# Patient Record
Sex: MALE | Race: WHITE | NOT HISPANIC OR LATINO | Employment: OTHER | URBAN - NONMETROPOLITAN AREA
[De-identification: names, ages, dates, MRNs, and addresses within clinical notes are randomized per-mention and may not be internally consistent; named-entity substitution may affect disease eponyms.]

---

## 2017-03-23 ENCOUNTER — HOSPITAL ENCOUNTER (EMERGENCY)
Facility: HOSPITAL | Age: 82
Discharge: HOME | End: 2017-03-23
Attending: EMERGENCY MEDICINE | Admitting: EMERGENCY MEDICINE
Payer: MEDICARE

## 2017-03-23 VITALS
TEMPERATURE: 96.5 F | RESPIRATION RATE: 18 BRPM | HEART RATE: 67 BPM | SYSTOLIC BLOOD PRESSURE: 118 MMHG | DIASTOLIC BLOOD PRESSURE: 64 MMHG | OXYGEN SATURATION: 96 %

## 2017-03-23 DIAGNOSIS — R31.9 HEMATURIA: ICD-10-CM

## 2017-03-23 DIAGNOSIS — N30.91 HEMORRHAGIC CYSTITIS: ICD-10-CM

## 2017-03-23 LAB
ALBUMIN SERPL-MCNC: 3.6 G/DL (ref 3.4–5)
ALBUMIN UR-MCNC: 100 MG/DL
ALP SERPL-CCNC: 66 U/L (ref 40–150)
ALT SERPL W P-5'-P-CCNC: 24 U/L (ref 0–70)
ANION GAP SERPL CALCULATED.3IONS-SCNC: 8 MMOL/L (ref 3–14)
APPEARANCE UR: CLEAR
APTT PPP: 28 SEC (ref 24–37)
AST SERPL W P-5'-P-CCNC: 19 U/L (ref 0–45)
BACTERIA #/AREA URNS HPF: ABNORMAL /HPF
BASOPHILS # BLD AUTO: 0.1 10E9/L (ref 0–0.2)
BASOPHILS NFR BLD AUTO: 1.1 %
BILIRUB SERPL-MCNC: 0.6 MG/DL (ref 0.2–1.3)
BILIRUB UR QL STRIP: NEGATIVE
BUN SERPL-MCNC: 28 MG/DL (ref 7–30)
CALCIUM SERPL-MCNC: 8.8 MG/DL (ref 8.5–10.1)
CHLORIDE SERPL-SCNC: 106 MMOL/L (ref 94–109)
CO2 SERPL-SCNC: 25 MMOL/L (ref 20–32)
COLOR UR AUTO: ABNORMAL
CREAT SERPL-MCNC: 1.16 MG/DL (ref 0.66–1.25)
CRP SERPL-MCNC: <2.9 MG/L (ref 0–8)
DIFFERENTIAL METHOD BLD: ABNORMAL
EOSINOPHIL # BLD AUTO: 0.5 10E9/L (ref 0–0.7)
EOSINOPHIL NFR BLD AUTO: 9.6 %
ERYTHROCYTE [DISTWIDTH] IN BLOOD BY AUTOMATED COUNT: 12.5 % (ref 10–15)
GFR SERPL CREATININE-BSD FRML MDRD: 59 ML/MIN/1.7M2
GLUCOSE SERPL-MCNC: 202 MG/DL (ref 70–99)
GLUCOSE UR STRIP-MCNC: NEGATIVE MG/DL
HCT VFR BLD AUTO: 35.6 % (ref 40–53)
HGB BLD-MCNC: 12.5 G/DL (ref 13.3–17.7)
HGB UR QL STRIP: ABNORMAL
IMM GRANULOCYTES # BLD: 0 10E9/L (ref 0–0.4)
IMM GRANULOCYTES NFR BLD: 0.2 %
INR PPP: 1.14 (ref 0.8–1.2)
KETONES UR STRIP-MCNC: NEGATIVE MG/DL
LEUKOCYTE ESTERASE UR QL STRIP: NEGATIVE
LYMPHOCYTES # BLD AUTO: 1.3 10E9/L (ref 0.8–5.3)
LYMPHOCYTES NFR BLD AUTO: 24.7 %
MCH RBC QN AUTO: 31.4 PG (ref 26.5–33)
MCHC RBC AUTO-ENTMCNC: 35.1 G/DL (ref 31.5–36.5)
MCV RBC AUTO: 89 FL (ref 78–100)
MONOCYTES # BLD AUTO: 0.5 10E9/L (ref 0–1.3)
MONOCYTES NFR BLD AUTO: 8.5 %
MUCOUS THREADS #/AREA URNS LPF: PRESENT /LPF
NEUTROPHILS # BLD AUTO: 3 10E9/L (ref 1.6–8.3)
NEUTROPHILS NFR BLD AUTO: 55.9 %
NITRATE UR QL: NEGATIVE
NRBC # BLD AUTO: 0 10*3/UL
NRBC BLD AUTO-RTO: 0 /100
PH UR STRIP: 5.5 PH (ref 4.7–8)
PLATELET # BLD AUTO: 145 10E9/L (ref 150–450)
POTASSIUM SERPL-SCNC: 4.4 MMOL/L (ref 3.4–5.3)
PROT SERPL-MCNC: 7.3 G/DL (ref 6.8–8.8)
RBC # BLD AUTO: 3.98 10E12/L (ref 4.4–5.9)
RBC #/AREA URNS AUTO: >182 /HPF (ref 0–2)
SODIUM SERPL-SCNC: 139 MMOL/L (ref 133–144)
SP GR UR STRIP: 1.01 (ref 1–1.03)
URN SPEC COLLECT METH UR: ABNORMAL
UROBILINOGEN UR STRIP-MCNC: NORMAL MG/DL (ref 0–2)
WBC # BLD AUTO: 5.4 10E9/L (ref 4–11)
WBC #/AREA URNS AUTO: 4 /HPF (ref 0–2)

## 2017-03-23 PROCEDURE — 85610 PROTHROMBIN TIME: CPT | Performed by: EMERGENCY MEDICINE

## 2017-03-23 PROCEDURE — 99283 EMERGENCY DEPT VISIT LOW MDM: CPT

## 2017-03-23 PROCEDURE — 86140 C-REACTIVE PROTEIN: CPT | Performed by: EMERGENCY MEDICINE

## 2017-03-23 PROCEDURE — 85025 COMPLETE CBC W/AUTO DIFF WBC: CPT | Performed by: EMERGENCY MEDICINE

## 2017-03-23 PROCEDURE — 25000132 ZZH RX MED GY IP 250 OP 250 PS 637: Mod: GY | Performed by: EMERGENCY MEDICINE

## 2017-03-23 PROCEDURE — 85730 THROMBOPLASTIN TIME PARTIAL: CPT | Performed by: EMERGENCY MEDICINE

## 2017-03-23 PROCEDURE — 36415 COLL VENOUS BLD VENIPUNCTURE: CPT | Performed by: EMERGENCY MEDICINE

## 2017-03-23 PROCEDURE — 99284 EMERGENCY DEPT VISIT MOD MDM: CPT | Performed by: EMERGENCY MEDICINE

## 2017-03-23 PROCEDURE — A9270 NON-COVERED ITEM OR SERVICE: HCPCS | Mod: GY | Performed by: EMERGENCY MEDICINE

## 2017-03-23 PROCEDURE — 80053 COMPREHEN METABOLIC PANEL: CPT | Performed by: EMERGENCY MEDICINE

## 2017-03-23 PROCEDURE — 81001 URINALYSIS AUTO W/SCOPE: CPT | Performed by: EMERGENCY MEDICINE

## 2017-03-23 RX ORDER — MULTIVIT-MIN/IRON/FOLIC ACID/K 18-600-40
1 CAPSULE ORAL DAILY
COMMUNITY
Start: 2013-12-03 | End: 2021-07-28

## 2017-03-23 RX ORDER — FINASTERIDE 5 MG/1
TABLET, FILM COATED ORAL
COMMUNITY
Start: 2016-04-07 | End: 2020-06-01

## 2017-03-23 RX ORDER — METFORMIN HCL 500 MG
TABLET, EXTENDED RELEASE 24 HR ORAL
COMMUNITY
Start: 2016-07-18 | End: 2020-05-21

## 2017-03-23 RX ORDER — OMEGA-3 FATTY ACIDS/FISH OIL 300-1000MG
CAPSULE ORAL
COMMUNITY
End: 2021-07-28

## 2017-03-23 RX ORDER — SIMVASTATIN 20 MG
TABLET ORAL
COMMUNITY
Start: 2016-08-01 | End: 2018-10-07

## 2017-03-23 RX ORDER — SULFAMETHOXAZOLE/TRIMETHOPRIM 800-160 MG
1 TABLET ORAL 2 TIMES DAILY
Qty: 6 TABLET | Refills: 0 | Status: SHIPPED | OUTPATIENT
Start: 2017-03-23 | End: 2017-03-26

## 2017-03-23 RX ORDER — MULTIPLE VITAMINS W/ MINERALS TAB 9MG-400MCG
1 TAB ORAL DAILY
COMMUNITY
End: 2019-06-12

## 2017-03-23 RX ORDER — SULFAMETHOXAZOLE/TRIMETHOPRIM 800-160 MG
1 TABLET ORAL ONCE
Status: COMPLETED | OUTPATIENT
Start: 2017-03-23 | End: 2017-03-23

## 2017-03-23 RX ORDER — LISINOPRIL 40 MG/1
TABLET ORAL
COMMUNITY
Start: 2017-01-29 | End: 2019-06-12

## 2017-03-23 RX ADMIN — SULFAMETHOXAZOLE AND TRIMETHOPRIM 1 TABLET: 800; 160 TABLET ORAL at 11:43

## 2017-03-23 ASSESSMENT — ENCOUNTER SYMPTOMS
DIFFICULTY URINATING: 0
CONSTITUTIONAL NEGATIVE: 1
BACK PAIN: 0
HEMATURIA: 1
DYSURIA: 0
FLANK PAIN: 0

## 2017-03-23 NOTE — ED PROVIDER NOTES
History     Chief Complaint   Patient presents with     Hematuria     onset yesterday. no complaints of pain. reports no other changes from normal.     HPI  Jeronimo Payton is a 89 year old male with above hx.  Asymptomatic onset of hematuria last nite without even dysuria or urgency.  No fever, back or gi symptoms. Previous TURP in 2003 with no problems since.     I have reviewed the Medications, Allergies, Past Medical and Surgical History, and Social History in the Epic system.    Review of Systems   Constitutional: Negative.    Genitourinary: Positive for hematuria. Negative for difficulty urinating, dysuria and flank pain.   Musculoskeletal: Negative for back pain.       Physical Exam   BP: 142/76  Pulse: 67  Temp: 96.5  F (35.8  C)  Resp: 18  SpO2: 96 %  Physical Exam   Constitutional: He appears well-developed.   Pleasant cooperative fellow appearing younger than stated age.    HENT:   Head: Normocephalic and atraumatic.   Eyes: Conjunctivae and EOM are normal. Pupils are equal, round, and reactive to light.   Neck: Normal range of motion.   Cardiovascular: Normal rate.    Pulmonary/Chest: Effort normal.   Abdominal: He exhibits no distension. There is no tenderness. There is no rebound.   appx scar   Genitourinary:   Genitourinary Comments: Uncircumsied, no lesions, rectal revealed enlarged but no nodules.    Musculoskeletal: Normal range of motion. He exhibits no edema.   Neurological: He is alert.   Skin: Skin is warm.     ED Course     ED Course     Procedures  Critical Care time:  none    Labs Ordered and Resulted from Time of ED Arrival Up to the Time of Departure from the ED   UA MACROSCOPIC WITH REFLEX TO MICRO AND CULTURE - Abnormal; Notable for the following:        Result Value    Blood Urine Large (*)     Protein Albumin Urine 100 (*)     RBC Urine >182 (*)     WBC Urine 4 (*)     Bacteria Urine None (*)     Mucous Urine Present (*)     All other components within normal limits   CBC WITH  PLATELETS DIFFERENTIAL - Abnormal; Notable for the following:     RBC Count 3.98 (*)     Hemoglobin 12.5 (*)     Hematocrit 35.6 (*)     Platelet Count 145 (*)     All other components within normal limits   COMPREHENSIVE METABOLIC PANEL - Abnormal; Notable for the following:     Glucose 202 (*)     GFR Estimate 59 (*)     All other components within normal limits   CRP INFLAMMATION   INR   PARTIAL THROMBOPLASTIN TIME     Assessments & Plan (with Medical Decision Making)   Jeronimo developed painless hematuria with only minimal WBCs so this is probably a structural problems but will assume it is hemorrhagic cystitis mediated by a bacterial infection.  Labs and culture done.  Bactrim DS given here in the ED with f/u for the next 5 days until seen jun Mchugh to decide on cysto and CT referrals.    I have reviewed the nursing notes.    I have reviewed the findings, diagnosis, plan and need for follow up with the patient.    New Prescriptions    SULFAMETHOXAZOLE-TRIMETHOPRIM (BACTRIM DS) 800-160 MG PER TABLET    Take 1 tablet by mouth 2 times daily for 3 days       Final diagnoses:   Hemorrhagic cystitis   Hematuria       3/23/2017   [facility]     Milton Perdomo MD  03/23/17 [id number]

## 2017-03-23 NOTE — DISCHARGE INSTRUCTIONS
Anatomy of the Male Urinary Tract  Your urinary tract helps to get rid of your body s liquid waste. The kidneys constantly filter the blood to collect unneeded chemicals and water, making urine. Urine travels through the ureters to the bladder. The bladder fills with urine, holding it until you re ready to release it. Signals from the brain tell the sphincter (muscles around the opening of the bladder) when to let urine flow out of the bladder. The urethra is the tube that carries urine from the bladder out of the body. In men, the prostate gland wraps around the urethra near the bladder.            0434-3853 [employer]. [address] [place] [address]. All rights reserved. This information is not intended as a substitute for professional medical care. Always follow your healthcare professional's instructions.          Hematuria: Possible Causes     Many things can lead to blood in the urine (hematuria). The blood may be seen with the eye. Or it may only be seen when the urine is looked at under a microscope. Some of the most common causes of blood in the urine are listed below. Often, no cause for the blood can be found. This is called idiopathic hematuria.    Kidney or bladder stones are collections of crystals. They form in the urine. Stones may be found anywhere in the urinary tract. But they form most often in the kidneys or bladder. In addition to blood in the urine, they can cause severe pain.   BPH stands for benign prostatic hyperplasia. It is enlargement of the prostate gland. It happens as men age. BPH often causes problems with urination. It sometimes causes blood in the urine.   A urinary tract infection (UTI) is due to bacteria growing in the urinary tract. It can cause blood in the urine. Other symptoms include burning or pain with urination. You may need to urinate often or urgently. You may also have a fever.     Damage to the urinary tract may cause blood in the urine.  This damage may be due to a blow or accident. It may also result from the use of a urinary catheter. Very hard exercise may sometimes irritate the urinary tract and cause bleeding.   Cancer may occur anywhere in the urinary tract. A tumor may sometimes cause no symptoms other than bleeding. Other possible causes of bleeding include:    Prostatitis (infection of the prostate gland)    Taking anticoagulant medications    Blockage in the urinary tract    Disease or inflammation of the kidney    Cystic diseases of the kidneys    Sickle cell anemia    Tumors of the urinary tract     7906-9077 [employer]. [address] [place] [address]. All rights reserved. This information is not intended as a substitute for professional medical care. Always follow your healthcare professional's instructions.          Understanding Urinary Tract Infections (UTIs)  Most UTIs are caused by bacteria, although they may also be caused by viruses or fungi. Bacteria from the bowel are the most common source of infection. The infection may begin because of any of the following:    Sexual activity. During sex, germs can travel from the penis, vagina, or rectum into the urethra.     Germs on the skin outside the rectum may travel into the urethra. This is more common in women since the rectum and urethra are closer to each other than in men. Wiping from front to back after using the toilet and keeping the area clean can help prevent germs from getting to the urethra.    Blockage of urine flow through the urinary tract. If urine sits too long, germs may begin to grow out of control.      Parts of the urinary tract  The infection can occur in any part of the urinary tract.    The kidneys collect and store urine.    The ureters carry urine from the kidneys to the bladder.    The bladder holds urine until you are ready to let it out.    The urethra carries urine from the bladder out of the body. It is shorter in women, so  bacteria can move through it more easily. The urethra is longer in men, so a UTI is less likely to reach the bladder or kidneys in men.    1280-9866 [employer]. [address] [place] [address], [place] [address]. All rights reserved. This information is not intended as a substitute for professional medical care. Always follow your healthcare professional's instructions.      Jeronimo,   This unexpected bleeding is usually caused by a bladder infection but it can be due to a structural lesion in your bladder or kidney that will need further imaging with a CT scan and cystoscopy by a urologist.  Take the antibiotics and plenty of fluids for the next few days and check in with Dr. Mchugh next week.  Nice meeting you folks.  Have a good spring out there in  [place].  Good luck!

## 2017-03-23 NOTE — ED NOTES
Patient presents today accompanied by spouse and son for hematuria.  Patient has had 3-4 episodes of painless hematuria.   Denies any pain or frequency and feels he is emptying his bladder.

## 2017-03-23 NOTE — ED AVS SNAPSHOT
[facility]    [address] [address]    Phone:  [phone]                                       Jeronimo Payton   MRN: [id number]    Department:  [facility]   Date of Visit:  3/23/2017           After Visit Summary Signature Page     I have received my discharge instructions, and my questions have been answered. I have discussed any challenges I see with this plan with the nurse or doctor.    ..........................................................................................................................................  Patient/Patient Representative Signature      ..........................................................................................................................................  Patient Representative Print Name and Relationship to Patient    ..................................................               ................................................  Date                                            Time    ..........................................................................................................................................  Reviewed by Signature/Title    ...................................................              ..............................................  Date                                                            Time

## 2017-03-23 NOTE — ED AVS SNAPSHOT
[facility]    [address] [address]    Phone:  [phone]                                       Jeronimo Payton   MRN: [id number]    Department:  [facility]   Date of Visit:  3/23/2017           Patient Information     Date Of Birth          6/29/1927        Your diagnoses for this visit were:     Hemorrhagic cystitis     Hematuria        You were seen by Milton Perdomo MD.      Follow-up Information     Follow up with Jim Mchugh MD.    Why:  As needed    Contact information:    [facility]  [address] [place] [address]  [phone]          Discharge Instructions         Anatomy of the Male Urinary Tract  Your urinary tract helps to get rid of your body s liquid waste. The kidneys constantly filter the blood to collect unneeded chemicals and water, making urine. Urine travels through the ureters to the bladder. The bladder fills with urine, holding it until you re ready to release it. Signals from the brain tell the sphincter (muscles around the opening of the bladder) when to let urine flow out of the bladder. The urethra is the tube that carries urine from the bladder out of the body. In men, the prostate gland wraps around the urethra near the bladder.            2668-9042 [employer]. [address] [place] [address]. All rights reserved. This information is not intended as a substitute for professional medical care. Always follow your healthcare professional's instructions.          Hematuria: Possible Causes     Many things can lead to blood in the urine (hematuria). The blood may be seen with the eye. Or it may only be seen when the urine is looked at under a microscope. Some of the most common causes of blood in the urine are listed below. Often, no cause for the blood can be found. This is called idiopathic hematuria.    Kidney or bladder stones are collections of crystals. They form in the urine. Stones may be  found anywhere in the urinary tract. But they form most often in the kidneys or bladder. In addition to blood in the urine, they can cause severe pain.   BPH stands for benign prostatic hyperplasia. It is enlargement of the prostate gland. It happens as men age. BPH often causes problems with urination. It sometimes causes blood in the urine.   A urinary tract infection (UTI) is due to bacteria growing in the urinary tract. It can cause blood in the urine. Other symptoms include burning or pain with urination. You may need to urinate often or urgently. You may also have a fever.     Damage to the urinary tract may cause blood in the urine. This damage may be due to a blow or accident. It may also result from the use of a urinary catheter. Very hard exercise may sometimes irritate the urinary tract and cause bleeding.   Cancer may occur anywhere in the urinary tract. A tumor may sometimes cause no symptoms other than bleeding. Other possible causes of bleeding include:    Prostatitis (infection of the prostate gland)    Taking anticoagulant medications    Blockage in the urinary tract    Disease or inflammation of the kidney    Cystic diseases of the kidneys    Sickle cell anemia    Tumors of the urinary tract     2371-7038 [employer]. [address] [place], [place] [address]. All rights reserved. This information is not intended as a substitute for professional medical care. Always follow your healthcare professional's instructions.          Understanding Urinary Tract Infections (UTIs)  Most UTIs are caused by bacteria, although they may also be caused by viruses or fungi. Bacteria from the bowel are the most common source of infection. The infection may begin because of any of the following:    Sexual activity. During sex, germs can travel from the penis, vagina, or rectum into the urethra.     Germs on the skin outside the rectum may travel into the urethra. This is more common in women since the  rectum and urethra are closer to each other than in men. Wiping from front to back after using the toilet and keeping the area clean can help prevent germs from getting to the urethra.    Blockage of urine flow through the urinary tract. If urine sits too long, germs may begin to grow out of control.      Parts of the urinary tract  The infection can occur in any part of the urinary tract.    The kidneys collect and store urine.    The ureters carry urine from the kidneys to the bladder.    The bladder holds urine until you are ready to let it out.    The urethra carries urine from the bladder out of the body. It is shorter in women, so bacteria can move through it more easily. The urethra is longer in men, so a UTI is less likely to reach the bladder or kidneys in men.    6879-9367 [employer]. [address] [place], [place] [address]. All rights reserved. This information is not intended as a substitute for professional medical care. Always follow your healthcare professional's instructions.      Jeronimo,   This unexpected bleeding is usually caused by a bladder infection but it can be due to a structural lesion in your bladder or kidney that will need further imaging with a CT scan and cystoscopy by a urologist.  Take the antibiotics and plenty of fluids for the next few days and check in with Dr. Mchugh next week.  Nice meeting you folks.  Have a good spring out there in  [place].  Good luck!       Review of your medicines      START taking        Dose / Directions Last dose taken    sulfamethoxazole-trimethoprim 800-160 MG per tablet   Commonly known as:  BACTRIM DS   Dose:  1 tablet   Quantity:  6 tablet        Take 1 tablet by mouth 2 times daily for 3 days   Refills:  0          Our records show that you are taking the medicines listed below. If these are incorrect, please call your family doctor or clinic.        Dose / Directions Last dose taken    ADVIL 200 MG capsule   Generic drug:   ibuprofen        Refills:  0        aspirin 81 MG tablet        Refills:  0        finasteride 5 MG tablet   Commonly known as:  PROSCAR        TAKE 1 TABLET BY MOUTH DAILY   Refills:  0        lisinopril 40 MG tablet   Commonly known as:  PRINIVIL/ZESTRIL        TAKE ONE TABLET BY MOUTH ONCE DAILY   Refills:  0        metFORMIN 500 MG 24 hr tablet   Commonly known as:  GLUCOPHAGE-XR        Take 1,000 mg in AM and 500 mg in PM. Swallow tablet whole; do not crush, divide or chew.   Refills:  0        Multi-vitamin Tabs tablet   Dose:  1 tablet        Take 1 tablet by mouth daily   Refills:  0        simvastatin 20 MG tablet   Commonly known as:  ZOCOR        TAKE 1 TABLET BY MOUTH EVERY DAY   Refills:  0        vitamin D 2000 UNITS Caps   Dose:  1 capsule        Take 1 capsule by mouth daily   Refills:  0                Prescriptions were sent or printed at these locations (1 Prescription)                   [facility] - [place], [place] - [address]   [address], [place] [address]    Telephone:  [phone]   Fax:  [phone]   Hours:                  Printed at Department/Unit printer (1 of 1)         sulfamethoxazole-trimethoprim (BACTRIM DS) 800-160 MG per tablet                Procedures and tests performed during your visit     CBC with platelets differential    CRP inflammation    Comprehensive metabolic panel    INR    Partial thromboplastin time    UA reflex to Microscopic and Culture      Orders Needing Specimen Collection     None      Pending Results     No orders found from 3/21/2017 to 3/24/2017.            Pending Culture Results     No orders found from 3/21/2017 to 3/24/2017.            Thank you for choosing [facility]       Thank you for choosing [facility] for your care. Our goal is always to provide you with excellent care. Hearing back from our patients is one way we can continue to improve our services. Please take a few minutes to complete the written survey that you may receive in  "the mail after you visit with us. Thank you!        [employer] Information     [employer] lets you send messages to your doctor, view your test results, renew your prescriptions, schedule appointments and more. To sign up, go to [email or web address] . Click on \"Log in\" on the left side of the screen, which will take you to the Welcome page. Then click on \"Sign up Now\" on the right side of the page.     You will be asked to enter the access code listed below, as well as some personal information. Please follow the directions to create your username and password.     Your access code is: [email or web address]  Expires: 2017 11:34 AM     Your access code will  in 90 days. If you need help or a new code, please call your [facility] clinic or [phone].        Care EveryWhere ID     This is your Care EveryWhere ID. This could be used by other organizations to access your [facility] medical records  [id number]        After Visit Summary       This is your record. Keep this with you and show to your community pharmacist(s) and doctor(s) at your next visit.                  "

## 2017-03-23 NOTE — ED NOTES
Discharge instructions reviewed with patient and he verbalizes understanding.  Denies any pain. Verbalizes understanding need for follow up.  1 written rx given for patient to fill at pharmacy of choice.

## 2017-03-28 ENCOUNTER — TRANSFERRED RECORDS (OUTPATIENT)
Dept: HEALTH INFORMATION MANAGEMENT | Facility: HOSPITAL | Age: 82
End: 2017-03-28

## 2017-04-03 ENCOUNTER — HOSPITAL ENCOUNTER (OUTPATIENT)
Dept: CT IMAGING | Facility: HOSPITAL | Age: 82
Discharge: HOME | End: 2017-04-03
Attending: FAMILY MEDICINE | Admitting: FAMILY MEDICINE
Payer: MEDICARE

## 2017-04-03 ENCOUNTER — TRANSFERRED RECORDS (OUTPATIENT)
Dept: HEALTH INFORMATION MANAGEMENT | Facility: HOSPITAL | Age: 82
End: 2017-04-03

## 2017-04-03 DIAGNOSIS — R31.9 HEMATURIA: Primary | ICD-10-CM

## 2017-04-03 PROCEDURE — 74178 CT ABD&PLV WO CNTR FLWD CNTR: CPT | Mod: TC

## 2017-04-03 RX ORDER — IOPAMIDOL 612 MG/ML
100 INJECTION, SOLUTION INTRAVASCULAR ONCE
Status: COMPLETED | OUTPATIENT
Start: 2017-04-03 | End: 2017-04-03

## 2017-04-03 RX ORDER — IOPAMIDOL 612 MG/ML
50 INJECTION, SOLUTION INTRAVASCULAR ONCE
Status: COMPLETED | OUTPATIENT
Start: 2017-04-03 | End: 2017-04-03

## 2017-04-03 RX ADMIN — IOPAMIDOL 30 ML: 612 INJECTION, SOLUTION INTRAVASCULAR at 15:51

## 2017-04-03 RX ADMIN — IOPAMIDOL 100 ML: 612 INJECTION, SOLUTION INTRAVASCULAR at 15:51

## 2017-04-25 ENCOUNTER — TRANSFERRED RECORDS (OUTPATIENT)
Dept: HEALTH INFORMATION MANAGEMENT | Facility: HOSPITAL | Age: 82
End: 2017-04-25

## 2017-04-25 LAB
ALBUMIN (URINE) MG/SPEC: 14.1 MG/DL
ALBUMIN/CREATININE RATIO: 99 (ref 0–29)
CREATININE (URINE): 142 MG/DL

## 2018-01-17 ENCOUNTER — TRANSFERRED RECORDS (OUTPATIENT)
Dept: HEALTH INFORMATION MANAGEMENT | Facility: CLINIC | Age: 83
End: 2018-01-17

## 2018-01-17 ENCOUNTER — HOSPITAL ENCOUNTER (OUTPATIENT)
Dept: CT IMAGING | Facility: HOSPITAL | Age: 83
Discharge: HOME | End: 2018-01-17
Attending: FAMILY MEDICINE | Admitting: FAMILY MEDICINE
Payer: MEDICARE
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
## 2018-01-17 DIAGNOSIS — T14.90XA TRAUMA: ICD-10-CM

## 2018-01-17 PROCEDURE — 70450 CT HEAD/BRAIN W/O DYE: CPT | Mod: TC

## 2018-01-31 ENCOUNTER — TRANSFERRED RECORDS (OUTPATIENT)
Dept: HEALTH INFORMATION MANAGEMENT | Facility: CLINIC | Age: 83
End: 2018-01-31

## 2018-03-12 ENCOUNTER — TRANSFERRED RECORDS (OUTPATIENT)
Dept: HEALTH INFORMATION MANAGEMENT | Facility: CLINIC | Age: 83
End: 2018-03-12

## 2018-03-28 LAB
CREAT SERPL-MCNC: 1.2 MG/DL (ref 0.7–1.2)
GFR SERPL CREATININE-BSD FRML MDRD: 57 ML/MIN/1.73M2
GLUCOSE SERPL-MCNC: 179 MG/DL (ref 70–100)
INR PPP: 1.1 (ref 0.9–1.1)
POTASSIUM SERPL-SCNC: 4.6 MEQ/L (ref 3.4–5.1)

## 2018-04-24 ENCOUNTER — TRANSFERRED RECORDS (OUTPATIENT)
Dept: HEALTH INFORMATION MANAGEMENT | Facility: HOSPITAL | Age: 83
End: 2018-04-24

## 2018-04-24 LAB
ALBUMIN (URINE) MG/SPEC: 5 MG/DL
ALBUMIN/CREATININE RATIO: 50 (ref 0–29)
CREATININE (URINE): 100 MG/DL

## 2018-07-08 ENCOUNTER — HOSPITAL ENCOUNTER (EMERGENCY)
Facility: HOSPITAL | Age: 83
Discharge: HOME | End: 2018-07-08
Attending: PHYSICIAN ASSISTANT | Admitting: PHYSICIAN ASSISTANT
Payer: MEDICARE

## 2018-07-08 ENCOUNTER — APPOINTMENT (OUTPATIENT)
Dept: GENERAL RADIOLOGY | Facility: HOSPITAL | Age: 83
End: 2018-07-08
Attending: PHYSICIAN ASSISTANT
Payer: MEDICARE

## 2018-07-08 VITALS
TEMPERATURE: 98 F | SYSTOLIC BLOOD PRESSURE: 127 MMHG | OXYGEN SATURATION: 98 % | RESPIRATION RATE: 16 BRPM | HEART RATE: 60 BPM | DIASTOLIC BLOOD PRESSURE: 74 MMHG

## 2018-07-08 DIAGNOSIS — M62.81 GENERALIZED MUSCLE WEAKNESS: ICD-10-CM

## 2018-07-08 DIAGNOSIS — R50.9 FEBRILE ILLNESS, ACUTE: ICD-10-CM

## 2018-07-08 LAB
ALBUMIN SERPL-MCNC: 3.3 G/DL (ref 3.4–5)
ALBUMIN UR-MCNC: 30 MG/DL
ALP SERPL-CCNC: 77 U/L (ref 40–150)
ALT SERPL W P-5'-P-CCNC: 103 U/L (ref 0–70)
ANION GAP SERPL CALCULATED.3IONS-SCNC: 10 MMOL/L (ref 3–14)
APPEARANCE UR: ABNORMAL
AST SERPL W P-5'-P-CCNC: 129 U/L (ref 0–45)
BACTERIA #/AREA URNS HPF: ABNORMAL /HPF
BASOPHILS # BLD AUTO: 0 10E9/L (ref 0–0.2)
BASOPHILS NFR BLD AUTO: 0.7 %
BILIRUB SERPL-MCNC: 0.7 MG/DL (ref 0.2–1.3)
BILIRUB UR QL STRIP: NEGATIVE
BUN SERPL-MCNC: 18 MG/DL (ref 7–30)
CALCIUM SERPL-MCNC: 8.2 MG/DL (ref 8.5–10.1)
CHLORIDE SERPL-SCNC: 102 MMOL/L (ref 94–109)
CO2 SERPL-SCNC: 23 MMOL/L (ref 20–32)
COLOR UR AUTO: YELLOW
CREAT SERPL-MCNC: 1.11 MG/DL (ref 0.66–1.25)
CRP SERPL-MCNC: 32.6 MG/L (ref 0–8)
DIFFERENTIAL METHOD BLD: ABNORMAL
EOSINOPHIL # BLD AUTO: 0 10E9/L (ref 0–0.7)
EOSINOPHIL NFR BLD AUTO: 0.4 %
ERYTHROCYTE [DISTWIDTH] IN BLOOD BY AUTOMATED COUNT: 12.4 % (ref 10–15)
GFR SERPL CREATININE-BSD FRML MDRD: 62 ML/MIN/1.7M2
GLUCOSE SERPL-MCNC: 196 MG/DL (ref 70–99)
GLUCOSE UR STRIP-MCNC: NEGATIVE MG/DL
HCT VFR BLD AUTO: 33.8 % (ref 40–53)
HETEROPH AB SER QL: NEGATIVE
HGB BLD-MCNC: 11.9 G/DL (ref 13.3–17.7)
HGB UR QL STRIP: ABNORMAL
IMM GRANULOCYTES # BLD: 0 10E9/L (ref 0–0.4)
IMM GRANULOCYTES NFR BLD: 0.7 %
KETONES UR STRIP-MCNC: NEGATIVE MG/DL
LACTATE SERPL-SCNC: 1.8 MMOL/L (ref 0.4–2)
LEUKOCYTE ESTERASE UR QL STRIP: NEGATIVE
LYMPHOCYTES # BLD AUTO: 0.7 10E9/L (ref 0.8–5.3)
LYMPHOCYTES NFR BLD AUTO: 26.9 %
MCH RBC QN AUTO: 31.6 PG (ref 26.5–33)
MCHC RBC AUTO-ENTMCNC: 35.2 G/DL (ref 31.5–36.5)
MCV RBC AUTO: 90 FL (ref 78–100)
MONOCYTES # BLD AUTO: 0.7 10E9/L (ref 0–1.3)
MONOCYTES NFR BLD AUTO: 26.5 %
MUCOUS THREADS #/AREA URNS LPF: PRESENT /LPF
NEUTROPHILS # BLD AUTO: 1.2 10E9/L (ref 1.6–8.3)
NEUTROPHILS NFR BLD AUTO: 44.8 %
NITRATE UR QL: NEGATIVE
NRBC # BLD AUTO: 0 10*3/UL
NRBC BLD AUTO-RTO: 0 /100
PARASITE SPEC INSPECT: NORMAL
PH UR STRIP: 5.5 PH (ref 4.7–8)
PLATELET # BLD AUTO: 98 10E9/L (ref 150–450)
POTASSIUM SERPL-SCNC: 3.9 MMOL/L (ref 3.4–5.3)
PROT SERPL-MCNC: 7 G/DL (ref 6.8–8.8)
RBC # BLD AUTO: 3.77 10E12/L (ref 4.4–5.9)
RBC #/AREA URNS AUTO: 2 /HPF (ref 0–2)
SODIUM SERPL-SCNC: 135 MMOL/L (ref 133–144)
SOURCE: ABNORMAL
SP GR UR STRIP: 1.01 (ref 1–1.03)
SPECIMEN SOURCE: NORMAL
UROBILINOGEN UR STRIP-MCNC: 2 MG/DL (ref 0–2)
WBC # BLD AUTO: 2.8 10E9/L (ref 4–11)
WBC #/AREA URNS AUTO: 2 /HPF (ref 0–5)

## 2018-07-08 PROCEDURE — 87207 SMEAR SPECIAL STAIN: CPT | Performed by: PHYSICIAN ASSISTANT

## 2018-07-08 PROCEDURE — 81001 URINALYSIS AUTO W/SCOPE: CPT | Performed by: PHYSICIAN ASSISTANT

## 2018-07-08 PROCEDURE — 87798 DETECT AGENT NOS DNA AMP: CPT

## 2018-07-08 PROCEDURE — 87015 SPECIMEN INFECT AGNT CONCNTJ: CPT | Performed by: PHYSICIAN ASSISTANT

## 2018-07-08 PROCEDURE — 87040 BLOOD CULTURE FOR BACTERIA: CPT | Mod: 59 | Performed by: PHYSICIAN ASSISTANT

## 2018-07-08 PROCEDURE — 25000132 ZZH RX MED GY IP 250 OP 250 PS 637: Mod: GY | Performed by: PHYSICIAN ASSISTANT

## 2018-07-08 PROCEDURE — A9270 NON-COVERED ITEM OR SERVICE: HCPCS | Mod: GY | Performed by: PHYSICIAN ASSISTANT

## 2018-07-08 PROCEDURE — 86308 HETEROPHILE ANTIBODY SCREEN: CPT | Performed by: PHYSICIAN ASSISTANT

## 2018-07-08 PROCEDURE — 36415 COLL VENOUS BLD VENIPUNCTURE: CPT | Performed by: PHYSICIAN ASSISTANT

## 2018-07-08 PROCEDURE — 99284 EMERGENCY DEPT VISIT MOD MDM: CPT | Performed by: PHYSICIAN ASSISTANT

## 2018-07-08 PROCEDURE — 87077 CULTURE AEROBIC IDENTIFY: CPT | Performed by: PHYSICIAN ASSISTANT

## 2018-07-08 PROCEDURE — 71045 X-RAY EXAM CHEST 1 VIEW: CPT | Mod: TC

## 2018-07-08 PROCEDURE — 86618 LYME DISEASE ANTIBODY: CPT | Performed by: PHYSICIAN ASSISTANT

## 2018-07-08 PROCEDURE — 85025 COMPLETE CBC W/AUTO DIFF WBC: CPT | Performed by: PHYSICIAN ASSISTANT

## 2018-07-08 PROCEDURE — 87186 SC STD MICRODIL/AGAR DIL: CPT | Performed by: PHYSICIAN ASSISTANT

## 2018-07-08 PROCEDURE — 86140 C-REACTIVE PROTEIN: CPT | Performed by: PHYSICIAN ASSISTANT

## 2018-07-08 PROCEDURE — 83605 ASSAY OF LACTIC ACID: CPT | Performed by: PHYSICIAN ASSISTANT

## 2018-07-08 PROCEDURE — 80053 COMPREHEN METABOLIC PANEL: CPT | Performed by: PHYSICIAN ASSISTANT

## 2018-07-08 PROCEDURE — 99284 EMERGENCY DEPT VISIT MOD MDM: CPT | Mod: 25

## 2018-07-08 RX ORDER — DOXYCYCLINE 100 MG/1
100 CAPSULE ORAL 2 TIMES DAILY
Qty: 28 CAPSULE | Refills: 0 | Status: SHIPPED | OUTPATIENT
Start: 2018-07-08 | End: 2018-07-22

## 2018-07-08 RX ORDER — DOXYCYCLINE HYCLATE 100 MG
100 TABLET ORAL ONCE
Status: COMPLETED | OUTPATIENT
Start: 2018-07-08 | End: 2018-07-08

## 2018-07-08 RX ORDER — ACETAMINOPHEN 325 MG/1
975 TABLET ORAL ONCE
Status: COMPLETED | OUTPATIENT
Start: 2018-07-08 | End: 2018-07-08

## 2018-07-08 RX ADMIN — ACETAMINOPHEN 975 MG: 325 TABLET, FILM COATED ORAL at 13:17

## 2018-07-08 RX ADMIN — DOXYCYCLINE HYCLATE 100 MG: 100 TABLET, FILM COATED ORAL at 15:51

## 2018-07-08 ASSESSMENT — ENCOUNTER SYMPTOMS
NAUSEA: 0
DIARRHEA: 0
CHEST TIGHTNESS: 0
SHORTNESS OF BREATH: 0
FEVER: 1
SORE THROAT: 0
VOMITING: 0
CHILLS: 1
COUGH: 0
WEAKNESS: 1
WHEEZING: 0
ABDOMINAL PAIN: 0

## 2018-07-08 NOTE — DISCHARGE INSTRUCTIONS
Take the Doxycycline as prescribed for your suspected tick borne illness. Increase fluids as you were mildly dehydrated today. Use your cane when walking. Please return here with any new or worsening symptoms as discussed.         Febrile Illness with Uncertain Cause (Adult)  You have a fever, but the cause is unknown. A fever is a natural reaction of the body to an illness such as infection due to a virus or bacteria. Sometimes other conditions such as cancer or immune diseases can cause fever, especially if the fever has lasted for more than a week or 2. In most cases, the temperature itself is not harmful. It actually helps the body fight infections. A fever does not need to be treated unless you feel very uncomfortable.  Sometimes a fever can be an early sign of a more serious infection, so make sure to follow up if your condition worsens.  Home care  Unless given other instructions by your healthcare provider, follow these guidelines when caring for yourself at home.  General care    If your symptoms are not severe, rest at home for the first 2 to 3 days. When you resume activity, don't let yourself get too tired.    For your overall health, don't smoke. Also avoid being exposed to secondhand smoke.    Your appetite may be poor, so a light diet is fine. Avoid dehydration by drinking 6 to 8 glasses of fluids per day (such as water, soft drinks, sports drinks, juices, tea, or soup). If you have congestion, extra fluids will help loosen secretions in the nose and lungs.  Medicines    You can take acetaminophen or ibuprofen for pain or to lower your temperature, unless you were given a different medicine to use. (Note: If you have chronic liver or kidney disease or have ever had a stomach ulcer or gastrointestinal bleeding, talk with your healthcare provider before using these medicines. Also talk to your provider if you are taking medicine to prevent blood clots.) Aspirin should never be given to anyone younger  than 18 years of age who is ill with a viral infection or fever. It may cause severe liver or brain damage.    If you were given antibiotics for an infection, take them until they are used up, or your healthcare provider tells you to stop. It is important to finish the antibiotics even though you feel better. This is to make sure the infection has cleared. Be aware that antibiotics are not usually given for a viral infection or a fever with an unknown cause.    Over-the-counter medicines will not shorten the duration of the illness. However, they may be helpful for the following symptoms: cough, sore throat, or nasal and sinus congestion. Ask your pharmacist for product suggestions. (Note: Don't use decongestants if you have high blood pressure.)  Follow-up care  Follow up with your healthcare provider, or as advised.    If a culture or other lab tests were done, you will be notified if your treatment needs to be changed. You can call as directed for the results.    If X-rays, a CT, or an ultrasound were done, a specialist will review them. You will be notified of any findings that may affect your care.  Call 911  Call 911 if any of these occur:    Trouble breathing or swallowing, or wheezing    Chest pain    Confusion    Extreme drowsiness or trouble awakening    Fainting or loss of consciousness    Rapid heart rate    Low blood pressure    Vomiting blood, or large amounts of blood in stool    Seizure  When to seek medical advice  Call your healthcare provider right away if any of these occur:    Cough with lots of colored sputum (mucus) or blood in your sputum    Severe headache    Face, neck, throat, or ear pain    Feeling drowsy    Abdominal pain    Repeated vomiting or diarrhea    Joint pain or a new rash    Burning when urinating    Fever of 100.4 F (38 C) or higher, or as directed by your healthcare provider    Feeling weak or dizzy  Date Last Reviewed: 11/1/2017    Weakness with Uncertain Cause  Based on  your exam today, the exact cause of your weakness is not certain. But your weakness does not seem to be a sign of a serious illness at this time. Keep an eye on your symptoms and get medical advice as instructed below.  Home care    Rest at home today. Don't over-exert yourself.    Take any medicine as prescribed.    For the next few days, drink extra fluids (unless your healthcare provider wants you to restrict fluids for other reasons). Don't skip meals.    Unless otherwise directed, continue to take any prescription medicines.    Contact your healthcare provider if you have any questions or concerns.  Follow-up care  Follow up with your healthcare provider, or as advised.  When to seek medical advice  Call your healthcare provider right away for any of the following:    Symptoms get worse    Symptoms don't start getting better within 2 days    Fever of 100.4  F (38  C) or higher, or as directed by your healthcare provider  Call 911  Call 911 for any of these:    Chest, arm, neck, jaw, or upper back pain    Trouble breathing    Numbness or weakness of the face, one arm, or one leg    Slurred speech, confusion, or trouble speaking, walking, or seeing    Blood in vomit or stool (black or red color)    Loss of consciousness    Severe headache  Date Last Reviewed: 10/1/2017    2894-7698 [employer]. [address] [place] [address]. All rights reserved. This information is not intended as a substitute for professional medical care. Always follow your healthcare professional's instructions.          5989-0368 [employer]. [address] [place] [address]. All rights reserved. This information is not intended as a substitute for professional medical care. Always follow your healthcare professional's instructions.

## 2018-07-08 NOTE — ED AVS SNAPSHOT
[facility]    [address] [address]    Phone:  [phone]                                       Jeronimo Payton   MRN: [id number]    Department:  [facility]   Date of Visit:  7/8/2018           Patient Information     Date Of Birth          6/29/1927        Your diagnoses for this visit were:     Febrile illness, acute     Generalized muscle weakness        You were seen by Gina Chatman PA-C.      Follow-up Information     Follow up with Jim Mchugh MD In 2 days.    Specialty:  Family Practice    Contact information:    [facility]  [address] [facility] [id number] [phone]          Follow up with [facility].    Specialty:  EMERGENCY MEDICINE    Why:  If symptoms worsen    Contact information:    [address] [address] [phone]    Additional information:    From [facility]: Take [address]. Turn left at [address]. Turn left at the first stoplight on [address]. At the first stop sign, take a right onto [address]. Take a left into the parking lot and continue through until you reach the North enterance of the building.       From [place]: Take [address]. Take the [address] ramp towards [place]. Turn left onto [address]. Take a slight right onto [address]. Turn left at the first stoplight on [address]. At the first stop sign, take a right onto [address]. Take a left into the parking lot and continue through until you reach the North enterance of the building.       From [place]: Take [address]. Take a right at [address]. At the first stop sign, take a right onto [address]. Take a left into the parking lot and continue through until you reach the North enterance of the building.         Discharge Instructions       Take the Doxycycline as prescribed for your suspected tick borne illness. Increase fluids  as you were mildly dehydrated today. Use your cane when walking. Please return here with any new or worsening symptoms as discussed.         Febrile Illness with Uncertain Cause (Adult)  You have a fever, but the cause is unknown. A fever is a natural reaction of the body to an illness such as infection due to a virus or bacteria. Sometimes other conditions such as cancer or immune diseases can cause fever, especially if the fever has lasted for more than a week or 2. In most cases, the temperature itself is not harmful. It actually helps the body fight infections. A fever does not need to be treated unless you feel very uncomfortable.  Sometimes a fever can be an early sign of a more serious infection, so make sure to follow up if your condition worsens.  Home care  Unless given other instructions by your healthcare provider, follow these guidelines when caring for yourself at home.  General care    If your symptoms are not severe, rest at home for the first 2 to 3 days. When you resume activity, don't let yourself get too tired.    For your overall health, don't smoke. Also avoid being exposed to secondhand smoke.    Your appetite may be poor, so a light diet is fine. Avoid dehydration by drinking 6 to 8 glasses of fluids per day (such as water, soft drinks, sports drinks, juices, tea, or soup). If you have congestion, extra fluids will help loosen secretions in the nose and lungs.  Medicines    You can take acetaminophen or ibuprofen for pain or to lower your temperature, unless you were given a different medicine to use. (Note: If you have chronic liver or kidney disease or have ever had a stomach ulcer or gastrointestinal bleeding, talk with your healthcare provider before using these medicines. Also talk to your provider if you are taking medicine to prevent blood clots.) Aspirin should never be given to anyone younger than 18 years of age who is ill with a viral infection or fever. It may cause severe liver  or brain damage.    If you were given antibiotics for an infection, take them until they are used up, or your healthcare provider tells you to stop. It is important to finish the antibiotics even though you feel better. This is to make sure the infection has cleared. Be aware that antibiotics are not usually given for a viral infection or a fever with an unknown cause.    Over-the-counter medicines will not shorten the duration of the illness. However, they may be helpful for the following symptoms: cough, sore throat, or nasal and sinus congestion. Ask your pharmacist for product suggestions. (Note: Don't use decongestants if you have high blood pressure.)  Follow-up care  Follow up with your healthcare provider, or as advised.    If a culture or other lab tests were done, you will be notified if your treatment needs to be changed. You can call as directed for the results.    If X-rays, a CT, or an ultrasound were done, a specialist will review them. You will be notified of any findings that may affect your care.  Call 911  Call 911 if any of these occur:    Trouble breathing or swallowing, or wheezing    Chest pain    Confusion    Extreme drowsiness or trouble awakening    Fainting or loss of consciousness    Rapid heart rate    Low blood pressure    Vomiting blood, or large amounts of blood in stool    Seizure  When to seek medical advice  Call your healthcare provider right away if any of these occur:    Cough with lots of colored sputum (mucus) or blood in your sputum    Severe headache    Face, neck, throat, or ear pain    Feeling drowsy    Abdominal pain    Repeated vomiting or diarrhea    Joint pain or a new rash    Burning when urinating    Fever of 100.4 F (38 C) or higher, or as directed by your healthcare provider    Feeling weak or dizzy  Date Last Reviewed: 11/1/2017    Weakness with Uncertain Cause  Based on your exam today, the exact cause of your weakness is not certain. But your weakness does not  seem to be a sign of a serious illness at this time. Keep an eye on your symptoms and get medical advice as instructed below.  Home care    Rest at home today. Don't over-exert yourself.    Take any medicine as prescribed.    For the next few days, drink extra fluids (unless your healthcare provider wants you to restrict fluids for other reasons). Don't skip meals.    Unless otherwise directed, continue to take any prescription medicines.    Contact your healthcare provider if you have any questions or concerns.  Follow-up care  Follow up with your healthcare provider, or as advised.  When to seek medical advice  Call your healthcare provider right away for any of the following:    Symptoms get worse    Symptoms don't start getting better within 2 days    Fever of 100.4  F (38  C) or higher, or as directed by your healthcare provider  Call 911  Call 911 for any of these:    Chest, arm, neck, jaw, or upper back pain    Trouble breathing    Numbness or weakness of the face, one arm, or one leg    Slurred speech, confusion, or trouble speaking, walking, or seeing    Blood in vomit or stool (black or red color)    Loss of consciousness    Severe headache  Date Last Reviewed: 10/1/2017    9352-4977 The [employer]. [address] [place] [address]. All rights reserved. This information is not intended as a substitute for professional medical care. Always follow your healthcare professional's instructions.          2425-3009 [employer]. [address] [place] [address]. All rights reserved. This information is not intended as a substitute for professional medical care. Always follow your healthcare professional's instructions.          Discharge References/Attachments     FEBRILE ILLNESS, UNCERTAIN CAUSE (ADULT) (ENGLISH)         Review of your medicines      START taking        Dose / Directions Last dose taken    doxycycline 100 MG capsule   Commonly known as:  VIBRAMYCIN    Dose:  100 mg   Quantity:  28 capsule        Take 1 capsule (100 mg) by mouth 2 times daily for 14 days   Refills:  0          Our records show that you are taking the medicines listed below. If these are incorrect, please call your family doctor or clinic.        Dose / Directions Last dose taken    ADVIL 200 MG capsule   Generic drug:  ibuprofen        Refills:  0        aspirin 81 MG tablet        Refills:  0        finasteride 5 MG tablet   Commonly known as:  PROSCAR        TAKE 1 TABLET BY MOUTH DAILY   Refills:  0        lisinopril 40 MG tablet   Commonly known as:  PRINIVIL/ZESTRIL        TAKE ONE TABLET BY MOUTH ONCE DAILY   Refills:  0        metFORMIN 500 MG 24 hr tablet   Commonly known as:  GLUCOPHAGE-XR        Take 1,000 mg in AM and 500 mg in PM. Swallow tablet whole; do not crush, divide or chew.   Refills:  0        Multi-vitamin Tabs tablet   Dose:  1 tablet        Take 1 tablet by mouth daily   Refills:  0        simvastatin 20 MG tablet   Commonly known as:  ZOCOR        TAKE 0.5 TABLET BY MOUTH EVERY DAY   Refills:  0        vitamin D 2000 units Caps   Dose:  1 capsule        Take 1 capsule by mouth daily   Refills:  0                Prescriptions were sent or printed at these locations (1 Prescription)                   [facility] [address] [facility] [address] [address] [address]   [address], [place] [address]    Telephone:  [phone]   Fax:  [phone]   Hours:                  E-Prescribed (1 of 1)         doxycycline (VIBRAMYCIN) 100 MG capsule                Procedures and tests performed during your visit     Procedure/Test Number of Times Performed    Babesia Species by PCR 1    Blood culture 2    CBC with platelets differential 1    CRP inflammation 1    Chest  XR, 1 view portable 1    Comprehensive metabolic panel 1    Ehrlichia Anaplasma Sp by PCR 1    Lactic acid 1    Lyme Disease Maude with reflex to WB Serum 1    Mononucleosis screen 1    Parasite stain 2    Peripheral IV catheter 1  "   UA reflex to Microscopic and Culture 1      Orders Needing Specimen Collection     None      Pending Results     Date and Time Order Name Status Description    2018 1313 Parasite stain In process     2018 1313 Lyme Disease Maude with reflex to WB Serum In process     2018 1313 Ehrlichia Anaplasma Sp by PCR In process     2018 1313 Blood culture In process     2018 1313 Blood culture In process     2018 1313 Babesia Species by PCR In process     2018 1311 Chest  XR, 1 view portable In process             Pending Culture Results     Date and Time Order Name Status Description    2018 1313 Parasite stain In process     2018 1313 Blood culture In process     2018 1313 Blood culture In process             Thank you for choosing [facility]       Thank you for choosing [facility] for your care. Our goal is always to provide you with excellent care. Hearing back from our patients is one way we can continue to improve our services. Please take a few minutes to complete the written survey that you may receive in the mail after you visit with us. Thank you!        [employer] Information     [employer] lets you send messages to your doctor, view your test results, renew your prescriptions, schedule appointments and more. To sign up, go to [email or web address] . Click on \"Log in\" on the left side of the screen, which will take you to the Welcome page. Then click on \"Sign up Now\" on the right side of the page.     You will be asked to enter the access code listed below, as well as some personal information. Please follow the directions to create your username and password.     Your access code is: [email or web address]  Expires: 10/6/2018  3:54 PM     Your access code will  in 90 days. If you need help or a new code, please call your [facility] clinic or [phone].        Care EveryWhere ID     This is your Care EveryWhere ID. This could be used by other organizations to access your [facility] " medical records  [id number]        Equal Access to Services     XAVIER GONZÁLES : Michael Augustin, carlo trejo, tony taylor. So [facility] [phone].    ATENCIÓN: Si habla español, tiene a jack disposición servicios gratuitos de asistencia lingüística. Llame al [phone].    We comply with applicable federal civil rights laws and [place] laws. We do not discriminate on the basis of race, color, national origin, age, disability, sex, sexual orientation, or gender identity.            After Visit Summary       This is your record. Keep this with you and show to your community pharmacist(s) and doctor(s) at your next visit.

## 2018-07-08 NOTE — ED NOTES
"The patient presents via [facility] from home where he lives with his wife with report of weakness today and fever a couple of days ago. Reports he noted a \"bug bite\" on his left forearm yesterday that he bumped and it bled.   "

## 2018-07-08 NOTE — ED AVS SNAPSHOT
[facility]    [address] [address]    Phone:  [phone]                                       Jeronimo Payton   MRN: [id number]    Department:  [facility]   Date of Visit:  7/8/2018           After Visit Summary Signature Page     I have received my discharge instructions, and my questions have been answered. I have discussed any challenges I see with this plan with the nurse or doctor.    ..........................................................................................................................................  Patient/Patient Representative Signature      ..........................................................................................................................................  Patient Representative Print Name and Relationship to Patient    ..................................................               ................................................  Date                                            Time    ..........................................................................................................................................  Reviewed by Signature/Title    ...................................................              ..............................................  Date                                                            Time

## 2018-07-08 NOTE — ED NOTES
Bed: ED03  Expected date:   Expected time:   Means of arrival:   Comments:  [place] [facility]

## 2018-07-08 NOTE — ED PROVIDER NOTES
History     Chief Complaint   Patient presents with     Fever     bullseye bug bite approx 5 days ago. DM and bg in the 220     HPI  Jeronimo Payton is a 89 year old male who is brought in via ambulance from home with bilateral lower extremity weakness, fevers/chills x 2 days, and concern for possible tick bite to left forearm. Noticed a bite marco to his left forearm 5 days ago and a bullseye rash. Did not see a tick, however. Has been taking tylenol for his fevers, last dose was last night. Usually walks with a cane but was unable to stand up just prior to arrival, so family called 911. He lives at home with his wife. His son is here as well. He is a type II diabetic on metformin. Glucose was 220 in route.     Problem List:    There are no active problems to display for this patient.       Past Medical History:    No past medical history on file.    Past Surgical History:    No past surgical history on file.    Family History:    No family history on file.    Social History:  Marital Status:   [2]  Social History   Substance Use Topics     Smoking status: Never Smoker     Smokeless tobacco: Never Used     Alcohol use Yes      Comment: beer occasionally        Medications:      aspirin 81 MG tablet   Cholecalciferol (VITAMIN D) 2000 UNITS CAPS   doxycycline (VIBRAMYCIN) 100 MG capsule   finasteride (PROSCAR) 5 MG tablet   ibuprofen (ADVIL) 200 MG capsule   lisinopril (PRINIVIL/ZESTRIL) 40 MG tablet   metFORMIN (GLUCOPHAGE-XR) 500 MG 24 hr tablet   multivitamin, therapeutic with minerals (MULTI-VITAMIN) TABS tablet   simvastatin (ZOCOR) 20 MG tablet         Review of Systems   Constitutional: Positive for chills and fever.   HENT: Negative for congestion and sore throat.    Respiratory: Negative for cough, chest tightness, shortness of breath and wheezing.    Cardiovascular: Negative for chest pain.   Gastrointestinal: Negative for abdominal pain, diarrhea, nausea and vomiting.   Skin: Negative.  Negative  for rash.   Neurological: Positive for weakness (Generalized. ).   All other systems reviewed and are negative.      Physical Exam   BP: 129/93 (Simultaneous filing. User may not have seen previous data.)  Heart Rate: 78 (Simultaneous filing. User may not have seen previous data.)  Temp: 99.7  F (37.6  C)  Resp: 16  SpO2: 96 %      Physical Exam   Constitutional: He is oriented to person, place, and time. He appears well-developed and well-nourished.  Non-toxic appearance. He does not have a sickly appearance. He appears ill. No distress.   HENT:   Head: Normocephalic and atraumatic.   Right Ear: External ear normal.   Left Ear: External ear normal.   Nose: Nose normal.   Mouth/Throat: Oropharynx is clear and moist. No oropharyngeal exudate.   Eyes: Conjunctivae and EOM are normal. Pupils are equal, round, and reactive to light. Right eye exhibits no discharge. Left eye exhibits no discharge. No scleral icterus.   Neck: Normal range of motion. Neck supple. No JVD present.   Cardiovascular: Normal rate, regular rhythm, normal heart sounds and intact distal pulses.  Exam reveals no gallop and no friction rub.    No murmur heard.  Pulmonary/Chest: Effort normal and breath sounds normal. No respiratory distress. He has no wheezes. He has no rales. He exhibits no tenderness.   Abdominal: He exhibits no distension and no mass. There is no tenderness. There is no rebound and no guarding.   Musculoskeletal: Normal range of motion. He exhibits no edema.   Lymphadenopathy:     He has no cervical adenopathy.   Neurological: He is alert and oriented to person, place, and time. No cranial nerve deficit. Coordination normal.   Skin: Skin is warm and dry. No rash noted. He is not diaphoretic. There is erythema. No pallor.        Psychiatric: He has a normal mood and affect. His behavior is normal. Judgment and thought content normal.   Nursing note and vitals reviewed.      ED Course     ED Course     Procedures                Results for orders placed or performed during the hospital encounter of 07/08/18 (from the past 24 hour(s))   Parasite stain   Result Value Ref Range    Specimen Description Blood     Parasite Stain Duplicate request  Canceled, Test credited      CBC with platelets differential   Result Value Ref Range    WBC 2.8 (L) 4.0 - 11.0 10e9/L    RBC Count 3.77 (L) 4.4 - 5.9 10e12/L    Hemoglobin 11.9 (L) 13.3 - 17.7 g/dL    Hematocrit 33.8 (L) 40.0 - 53.0 %    MCV 90 78 - 100 fl    MCH 31.6 26.5 - 33.0 pg    MCHC 35.2 31.5 - 36.5 g/dL    RDW 12.4 10.0 - 15.0 %    Platelet Count 98 (L) 150 - 450 10e9/L    Diff Method Automated Method     % Neutrophils 44.8 %    % Lymphocytes 26.9 %    % Monocytes 26.5 %    % Eosinophils 0.4 %    % Basophils 0.7 %    % Immature Granulocytes 0.7 %    Nucleated RBCs 0 0 /100    Absolute Neutrophil 1.2 (L) 1.6 - 8.3 10e9/L    Absolute Lymphocytes 0.7 (L) 0.8 - 5.3 10e9/L    Absolute Monocytes 0.7 0.0 - 1.3 10e9/L    Absolute Eosinophils 0.0 0.0 - 0.7 10e9/L    Absolute Basophils 0.0 0.0 - 0.2 10e9/L    Abs Immature Granulocytes 0.0 0 - 0.4 10e9/L    Absolute Nucleated RBC 0.0    Comprehensive metabolic panel   Result Value Ref Range    Sodium 135 133 - 144 mmol/L    Potassium 3.9 3.4 - 5.3 mmol/L    Chloride 102 94 - 109 mmol/L    Carbon Dioxide 23 20 - 32 mmol/L    Anion Gap 10 3 - 14 mmol/L    Glucose 196 (H) 70 - 99 mg/dL    Urea Nitrogen 18 7 - 30 mg/dL    Creatinine 1.11 0.66 - 1.25 mg/dL    GFR Estimate 62 >60 mL/min/1.7m2    GFR Estimate If Black 75 >60 mL/min/1.7m2    Calcium 8.2 (L) 8.5 - 10.1 mg/dL    Bilirubin Total 0.7 0.2 - 1.3 mg/dL    Albumin 3.3 (L) 3.4 - 5.0 g/dL    Protein Total 7.0 6.8 - 8.8 g/dL    Alkaline Phosphatase 77 40 - 150 U/L     (H) 0 - 70 U/L     (H) 0 - 45 U/L   CRP inflammation   Result Value Ref Range    CRP Inflammation 32.6 (H) 0.0 - 8.0 mg/L   Lactic acid   Result Value Ref Range    Lactic Acid 1.8 0.4 - 2.0 mmol/L   Mononucleosis screen    Result Value Ref Range    Mononucleosis Screen Negative NEG^Negative   UA reflex to Microscopic and Culture   Result Value Ref Range    Color Urine Yellow     Appearance Urine Slightly Cloudy     Glucose Urine Negative NEG^Negative mg/dL    Bilirubin Urine Negative NEG^Negative    Ketones Urine Negative NEG^Negative mg/dL    Specific Gravity Urine 1.015 1.003 - 1.035    Blood Urine Small (A) NEG^Negative    pH Urine 5.5 4.7 - 8.0 pH    Protein Albumin Urine 30 (A) NEG^Negative mg/dL    Urobilinogen mg/dL 2.0 0.0 - 2.0 mg/dL    Nitrite Urine Negative NEG^Negative    Leukocyte Esterase Urine Negative NEG^Negative    Source Midstream Urine     RBC Urine 2 0 - 2 /HPF    WBC Urine 2 0 - 5 /HPF    Bacteria Urine Few (A) NEG^Negative /HPF    Mucous Urine Present (A) NEG^Negative /LPF       Medications   acetaminophen (TYLENOL) tablet 975 mg (975 mg Oral Given 7/8/18 1317)   doxycycline (VIBRA-TABS) tablet 100 mg (100 mg Oral Given 7/8/18 1551)       Assessments & Plan (with Medical Decision Making)   Jeronimo presents with generalized weakness, low grade fevers, and concern for possible tick bite with bullseye rash 5 days ago. There is a 1.5cm circular area to his left forearm with erythema and a small skin tear where he states the small bite was 5 days ago. No bullseye rash today. Physical exam is otherwise unremarkable. Broad infection workup was performed today with no obvious source. His white count is low at 2.58. CRP elevated at 32. LFT's slightly bumped. UA negative for infection. CXR no obvious infiltrate per my read. Tick panel ordered and pending. He was given a 1 liter bolus of NS and Tylenol 975mg PO for low grade temp. Following, he is able to stand up and walk unassisted. Pt and his family feel given his strength has improved that they are comfortable taking him back home at this point. I will cover him for lymes with doxycycline 100mg BID x 14 days, first dose given here. I suspect he was mildly dehydrated  today given improvement with fluids. Suggested increase fluids and fever control. Close f/u with primary care and return here with any new/worsening symptoms.     Plan: Take the Doxycycline as prescribed for your suspected tick borne illness. Increase fluids as you were mildly dehydrated today. Use your cane when walking. Please return here with any new or worsening symptoms as discussed.     I have reviewed the nursing notes.    I have reviewed the findings, diagnosis, plan and need for follow up with the patient.    New Prescriptions    DOXYCYCLINE (VIBRAMYCIN) 100 MG CAPSULE    Take 1 capsule (100 mg) by mouth 2 times daily for 14 days       Final diagnoses:   Febrile illness, acute   Generalized muscle weakness       7/8/2018   [facility]     Gina Chatman PA-C  07/08/18 3383

## 2018-07-09 NOTE — PROVIDER NOTIFICATION
DATE:  7/9/2018   TIME OF RECEIPT FROM LAB:  0402  LAB TEST:  Blood cultures  LAB VALUE:  2 bottles positive for gram + cocci  RESULTS GIVEN WITH READ-BACK TO (PROVIDER):  Yo Velazquez  TIME LAB VALUE REPORTED TO PROVIDER:   0500      * Patient started on oral Doxycycline. Will await final result and sensitivity, per MD.

## 2018-07-10 LAB — B BURGDOR IGG+IGM SER QL: 0.04 (ref 0–0.89)

## 2018-07-11 LAB
Lab: NORMAL
PARASITE SPEC INSPECT: NORMAL
SPECIMEN SOURCE: NORMAL

## 2018-07-12 LAB
A PHAGOCYTOPH DNA BLD QL NAA+PROBE: NOT DETECTED
E CHAFFEENSIS DNA BLD QL NAA+PROBE: NOT DETECTED
E EWINGII DNA SPEC QL NAA+PROBE: NOT DETECTED
EHRLICHIA DNA SPEC QL NAA+PROBE: NOT DETECTED

## 2018-07-13 LAB
B MICROTI DNA SPEC QL NAA+PROBE: NOT DETECTED
BABESIA DNA SPEC QL NAA+PROBE: NOT DETECTED
BACTERIA SPEC CULT: ABNORMAL
Lab: ABNORMAL
SPECIMEN SOURCE: ABNORMAL

## 2018-07-14 LAB
BACTERIA SPEC CULT: NORMAL
Lab: NORMAL
SPECIMEN SOURCE: NORMAL

## 2018-08-13 LAB
CREAT SERPL-MCNC: 1.18 MG/DL (ref 0.7–1.2)
GFR SERPL CREATININE-BSD FRML MDRD: 58 ML/MIN/1.73M2
GLUCOSE SERPL-MCNC: 155 MG/DL (ref 70–100)
INR PPP: 1 (ref 0.9–1.1)
POTASSIUM SERPL-SCNC: 4.5 MEQ/L (ref 3.4–5.1)

## 2018-10-07 ENCOUNTER — HOSPITAL ENCOUNTER (EMERGENCY)
Facility: HOSPITAL | Age: 83
Discharge: HOME | End: 2018-10-07
Attending: FAMILY MEDICINE | Admitting: FAMILY MEDICINE
Payer: MEDICARE

## 2018-10-07 ENCOUNTER — APPOINTMENT (OUTPATIENT)
Dept: GENERAL RADIOLOGY | Facility: HOSPITAL | Age: 83
End: 2018-10-07
Attending: FAMILY MEDICINE
Payer: MEDICARE

## 2018-10-07 VITALS
DIASTOLIC BLOOD PRESSURE: 76 MMHG | TEMPERATURE: 97.7 F | WEIGHT: 195 LBS | SYSTOLIC BLOOD PRESSURE: 134 MMHG | OXYGEN SATURATION: 98 % | RESPIRATION RATE: 18 BRPM

## 2018-10-07 DIAGNOSIS — R07.89 ATYPICAL CHEST PAIN: ICD-10-CM

## 2018-10-07 LAB
ALBUMIN SERPL-MCNC: 3.7 G/DL (ref 3.4–5)
ALP SERPL-CCNC: 79 U/L (ref 40–150)
ALT SERPL W P-5'-P-CCNC: 27 U/L (ref 0–70)
ANION GAP SERPL CALCULATED.3IONS-SCNC: 7 MMOL/L (ref 3–14)
APTT PPP: 25 SEC (ref 24–37)
AST SERPL W P-5'-P-CCNC: 29 U/L (ref 0–45)
BASOPHILS # BLD AUTO: 0 10E9/L (ref 0–0.2)
BASOPHILS NFR BLD AUTO: 0.6 %
BILIRUB SERPL-MCNC: 0.5 MG/DL (ref 0.2–1.3)
BUN SERPL-MCNC: 31 MG/DL (ref 7–30)
CALCIUM SERPL-MCNC: 8.8 MG/DL (ref 8.5–10.1)
CHLORIDE SERPL-SCNC: 108 MMOL/L (ref 94–109)
CO2 SERPL-SCNC: 26 MMOL/L (ref 20–32)
CREAT SERPL-MCNC: 1.09 MG/DL (ref 0.66–1.25)
D DIMER PPP DDU-MCNC: 330 NG/ML D-DU (ref 0–300)
DIFFERENTIAL METHOD BLD: ABNORMAL
EOSINOPHIL # BLD AUTO: 0.1 10E9/L (ref 0–0.7)
EOSINOPHIL NFR BLD AUTO: 2 %
ERYTHROCYTE [DISTWIDTH] IN BLOOD BY AUTOMATED COUNT: 12.8 % (ref 10–15)
GFR SERPL CREATININE-BSD FRML MDRD: 63 ML/MIN/1.7M2
GLUCOSE SERPL-MCNC: 226 MG/DL (ref 70–99)
HCT VFR BLD AUTO: 37.3 % (ref 40–53)
HGB BLD-MCNC: 12.8 G/DL (ref 13.3–17.7)
IMM GRANULOCYTES # BLD: 0 10E9/L (ref 0–0.4)
IMM GRANULOCYTES NFR BLD: 0.3 %
INR PPP: 1.19 (ref 0.8–1.2)
LIPASE SERPL-CCNC: 128 U/L (ref 73–393)
LYMPHOCYTES # BLD AUTO: 0.8 10E9/L (ref 0.8–5.3)
LYMPHOCYTES NFR BLD AUTO: 12.5 %
MAGNESIUM SERPL-MCNC: 2.1 MG/DL (ref 1.6–2.3)
MCH RBC QN AUTO: 31.3 PG (ref 26.5–33)
MCHC RBC AUTO-ENTMCNC: 34.3 G/DL (ref 31.5–36.5)
MCV RBC AUTO: 91 FL (ref 78–100)
MONOCYTES # BLD AUTO: 0.4 10E9/L (ref 0–1.3)
MONOCYTES NFR BLD AUTO: 5.3 %
NEUTROPHILS # BLD AUTO: 5.2 10E9/L (ref 1.6–8.3)
NEUTROPHILS NFR BLD AUTO: 79.3 %
NRBC # BLD AUTO: 0 10*3/UL
NRBC BLD AUTO-RTO: 0 /100
NT-PROBNP SERPL-MCNC: 1325 PG/ML (ref 0–1800)
PLATELET # BLD AUTO: 150 10E9/L (ref 150–450)
POTASSIUM SERPL-SCNC: 4.3 MMOL/L (ref 3.4–5.3)
PROT SERPL-MCNC: 7.6 G/DL (ref 6.8–8.8)
RBC # BLD AUTO: 4.09 10E12/L (ref 4.4–5.9)
SODIUM SERPL-SCNC: 141 MMOL/L (ref 133–144)
TROPONIN I SERPL-MCNC: 0.02 UG/L (ref 0–0.04)
TROPONIN I SERPL-MCNC: <0.015 UG/L (ref 0–0.04)
WBC # BLD AUTO: 6.6 10E9/L (ref 4–11)

## 2018-10-07 PROCEDURE — 85730 THROMBOPLASTIN TIME PARTIAL: CPT | Performed by: FAMILY MEDICINE

## 2018-10-07 PROCEDURE — 71045 X-RAY EXAM CHEST 1 VIEW: CPT | Mod: TC

## 2018-10-07 PROCEDURE — 83690 ASSAY OF LIPASE: CPT | Performed by: FAMILY MEDICINE

## 2018-10-07 PROCEDURE — 80053 COMPREHEN METABOLIC PANEL: CPT | Performed by: FAMILY MEDICINE

## 2018-10-07 PROCEDURE — 83735 ASSAY OF MAGNESIUM: CPT | Performed by: FAMILY MEDICINE

## 2018-10-07 PROCEDURE — 84484 ASSAY OF TROPONIN QUANT: CPT | Mod: 91 | Performed by: FAMILY MEDICINE

## 2018-10-07 PROCEDURE — 99284 EMERGENCY DEPT VISIT MOD MDM: CPT | Mod: Z6 | Performed by: FAMILY MEDICINE

## 2018-10-07 PROCEDURE — 99285 EMERGENCY DEPT VISIT HI MDM: CPT | Mod: 25

## 2018-10-07 PROCEDURE — 83880 ASSAY OF NATRIURETIC PEPTIDE: CPT | Mod: GZ | Performed by: FAMILY MEDICINE

## 2018-10-07 PROCEDURE — 85610 PROTHROMBIN TIME: CPT | Performed by: FAMILY MEDICINE

## 2018-10-07 PROCEDURE — 93005 ELECTROCARDIOGRAM TRACING: CPT

## 2018-10-07 PROCEDURE — 36415 COLL VENOUS BLD VENIPUNCTURE: CPT | Performed by: FAMILY MEDICINE

## 2018-10-07 PROCEDURE — 85379 FIBRIN DEGRADATION QUANT: CPT | Performed by: FAMILY MEDICINE

## 2018-10-07 PROCEDURE — 85025 COMPLETE CBC W/AUTO DIFF WBC: CPT | Performed by: FAMILY MEDICINE

## 2018-10-07 ASSESSMENT — ENCOUNTER SYMPTOMS
TROUBLE SWALLOWING: 0
COUGH: 0
ANOREXIA: 0
DIZZINESS: 1
PND: 0
ALTERED MENTAL STATUS: 0
BACK PAIN: 0
NUMBNESS: 0
SYNCOPE: 0
ORTHOPNEA: 0
NEAR-SYNCOPE: 0
CLAUDICATION: 0
FEVER: 0
LOWER EXTREMITY EDEMA: 0
HEARTBURN: 0
VOMITING: 0
AICD PROBLEM: 0
FATIGUE: 0
DIAPHORESIS: 1
PALPITATIONS: 0
ABDOMINAL PAIN: 0
SHORTNESS OF BREATH: 0
NAUSEA: 0
WEAKNESS: 0
HEADACHES: 0

## 2018-10-07 NOTE — ED NOTES
Patient denies needs at this time.  States pain is not as bad as it was, but he can still feel it.  Instructed him that we are waiting on just a couple other lab results and then once those come back, the provider will come in and talk to him.  States understanding. Call light within reach.

## 2018-10-07 NOTE — ED AVS SNAPSHOT
[facility]    [address] [address]    Phone:  [phone]                                       Jeronimo Payton   MRN: [id number]    Department:  [facility]   Date of Visit:  10/7/2018           After Visit Summary Signature Page     I have received my discharge instructions, and my questions have been answered. I have discussed any challenges I see with this plan with the nurse or doctor.    ..........................................................................................................................................  Patient/Patient Representative Signature      ..........................................................................................................................................  Patient Representative Print Name and Relationship to Patient    ..................................................               ................................................  Date                                   Time    ..........................................................................................................................................  Reviewed by Signature/Title    ...................................................              ..............................................  Date                                               Time          22EPIC Rev 08/18

## 2018-10-07 NOTE — ED PROVIDER NOTES
History     Chief Complaint   Patient presents with     Chest Pain     Woke up with chest tightness at approximiately 0400     Patient is a 89 year old male presenting with chest pain.   Chest Pain   Pain location:  Substernal area and L chest  Pain quality: tightness    Pain radiates to:  Does not radiate  Pain severity:  Moderate  Onset quality:  Sudden  Duration:  30 minutes  Timing:  Constant  Progression:  Resolved  Chronicity:  New  Context: at rest    Relieved by:  Aspirin  Ineffective treatments:  None tried  Associated symptoms: diaphoresis and dizziness    Associated symptoms: no abdominal pain, no AICD problem, no altered mental status, no anorexia, no anxiety, no back pain, no claudication, no cough, no dysphagia, no fatigue, no fever, no headache, no heartburn, no lower extremity edema, no nausea, no near-syncope, no numbness, no orthopnea, no palpitations, no PND, no shortness of breath, no syncope, no vomiting and no weakness    Risk factors: diabetes mellitus and male sex    Risk factors: no aortic disease, no birth control, no coronary artery disease, no Kierra-Danlos syndrome, no high cholesterol, no hypertension, no immobilization, no Marfan's syndrome, not obese, not pregnant, no prior DVT/PE, no smoking and no surgery       Jeronimo Payton is a 89 year old male who presents by EMS for evaluation of episode of chest pain     Problem List:    There are no active problems to display for this patient.       Past Medical History:    No past medical history on file.    Past Surgical History:    No past surgical history on file.    Family History:    No family history on file.    Social History:  Marital Status:   [2]  Social History   Substance Use Topics     Smoking status: Never Smoker     Smokeless tobacco: Never Used     Alcohol use Yes      Comment: beer occasionally        Medications:      aspirin 81 MG tablet   Cholecalciferol (VITAMIN D) 2000 UNITS CAPS   finasteride (PROSCAR) 5 MG  tablet   ibuprofen (ADVIL) 200 MG capsule   lisinopril (PRINIVIL/ZESTRIL) 40 MG tablet   metFORMIN (GLUCOPHAGE-XR) 500 MG 24 hr tablet   multivitamin, therapeutic with minerals (MULTI-VITAMIN) TABS tablet         Review of Systems   Constitutional: Positive for diaphoresis. Negative for fatigue and fever.   HENT: Negative for trouble swallowing.    Respiratory: Negative for cough and shortness of breath.    Cardiovascular: Positive for chest pain. Negative for palpitations, orthopnea, claudication, syncope, PND and near-syncope.   Gastrointestinal: Negative for abdominal pain, anorexia, heartburn, nausea and vomiting.   Genitourinary: Negative.    Musculoskeletal: Negative for back pain.   Neurological: Positive for dizziness. Negative for weakness, numbness and headaches.       Physical Exam   BP: 148/84  Heart Rate: 66  Temp: 97.6  F (36.4  C)  Resp: 16  Weight: 88.5 kg (195 lb)  SpO2: 99 %      Physical Exam   Constitutional: He is oriented to person, place, and time. He appears well-developed and well-nourished.   HENT:   Head: Normocephalic and atraumatic.   Eyes: Pupils are equal, round, and reactive to light.   Neck: Normal range of motion. Neck supple. No JVD present. No tracheal deviation present. No thyromegaly present.   Cardiovascular: Normal rate and regular rhythm.    No murmur heard.  Pulmonary/Chest: Effort normal and breath sounds normal. No stridor. No respiratory distress. He has no wheezes. He has no rales. He exhibits no tenderness.   Abdominal: Soft. Bowel sounds are normal. He exhibits no distension and no mass. There is no tenderness. There is no rebound and no guarding.   Musculoskeletal: He exhibits edema.   Trace pedal edema    Lymphadenopathy:     He has no cervical adenopathy.   Neurological: He is alert and oriented to person, place, and time.   Skin: Skin is warm.   Psychiatric: He has a normal mood and affect.   Nursing note and vitals reviewed.      ED Course     ED Course      Procedures        Patient presents to ER by EMS with complaint of chest tightness that awoke him from sleep last night. EMS report taken. Vital signs reviewed. Patient states pain has resolved.Cardiac monitors placed EKG obtained . EKG without acute ST / T changes or arrhythmias  Patient received 324 mg aspirin in rig en route . Medical records reviewed History and exam completed. Labs and diagnostics ordered. Initial labs reviewed . Troponin I normal . 2nd troponin ordered for 3 hours. While awaiting second troponin  Patient care transitioned to oncoming provider Dr No        EKG Interpretation:      Interpreted by Walter Leach  Time reviewed: 0600  Symptoms at time of EKG: none   Rhythm: normal sinus   Rate: normal  Axis: normal  Ectopy: none  Conduction: normal  ST Segments/ T Waves: No ST-T wave changes  Q Waves: none  Comparison to prior: no comparison     Clinical Impression: no acute ST /T changes                  No results found for this or any previous visit (from the past 24 hour(s)).    Medications - No data to display    Assessments & Plan (with Medical Decision Making)     I have reviewed the nursing notes.    I have reviewed the findings, diagnosis, plan and need for follow up with the patient.      New Prescriptions    No medications on file       Final diagnoses:   Atypical chest pain       10/7/2018   [facility]     Kalyani Mayorga MD  10/07/18 4283

## 2018-10-07 NOTE — ED AVS SNAPSHOT
[facility]    [address]    [place] [address]    Phone:  [phone]                                       Jeronimo Payton   MRN: [id number]    Department:  [facility]   Date of Visit:  10/7/2018           Patient Information     Date Of Birth          6/29/1927        Your diagnoses for this visit were:     Atypical chest pain        You were seen by Kalyani Mayorga MD and Mónica No MD.         Review of your medicines      Our records show that you are taking the medicines listed below. If these are incorrect, please call your family doctor or clinic.        Dose / Directions Last dose taken    ADVIL 200 MG capsule   Generic drug:  ibuprofen        Refills:  0        aspirin 81 MG tablet        Refills:  0        finasteride 5 MG tablet   Commonly known as:  PROSCAR        TAKE 1 TABLET BY MOUTH DAILY   Refills:  0        lisinopril 40 MG tablet   Commonly known as:  PRINIVIL/ZESTRIL        TAKE ONE TABLET BY MOUTH ONCE DAILY   Refills:  0        metFORMIN 500 MG 24 hr tablet   Commonly known as:  GLUCOPHAGE-XR        Take 1,000 mg in AM and 500 mg in PM. Swallow tablet whole; do not crush, divide or chew.   Refills:  0        Multi-vitamin Tabs tablet   Dose:  1 tablet        Take 1 tablet by mouth daily   Refills:  0        vitamin D 2000 units Caps   Dose:  1 capsule        Take 1 capsule by mouth daily   Refills:  0                Procedures and tests performed during your visit     Procedure/Test Number of Times Performed    CBC with platelets differential 1    Cardiac Continuous Monitoring 1    Comprehensive metabolic panel 1    D-Dimer ([facility],) 1    INR 1    Lipase 1    Magnesium 1    Nt probnp inpatient (BNP) 1    Partial thromboplastin time 1    Peripheral IV: Standard 1    Pulse oximetry nursing 1    Review medications with patient 1    Troponin I 2    XR Chest Port 1 View 1      Orders Needing Specimen Collection     None      Pending  "Results     No orders found from 10/5/2018 to 10/8/2018.            Pending Culture Results     No orders found from 10/5/2018 to 10/8/2018.            Thank you for choosing [facility]       Thank you for choosing [facility] for your care. Our goal is always to provide you with excellent care. Hearing back from our patients is one way we can continue to improve our services. Please take a few minutes to complete the written survey that you may receive in the mail after you visit with us. Thank you!        [facility] Information     [employer] lets you send messages to your doctor, view your test results, renew your prescriptions, schedule appointments and more. To sign up, go to [email or web address] . Click on \"Log in\" on the left side of the screen, which will take you to the Welcome page. Then click on \"Sign up Now\" on the right side of the page.     You will be asked to enter the access code listed below, as well as some personal information. Please follow the directions to create your username and password.     Your access code is: [email or web address]  Expires: 2019 10:59 AM     Your access code will  in 90 days. If you need help or a new code, please call your [facility] clinic or [phone].        Care EveryWhere ID     This is your Care EveryWhere ID. This could be used by other organizations to access your [facility] medical records  [id number]        Equal Access to Services     XAVIER GONZÁLES AH: Hadii salinas Augustin, waaxda maya, qaybta kaalmakoby seymour, tony flores . So [facility] [phone].    ATENCIÓN: Si habla español, tiene a jack disposición servicios gratuitos de asistencia lingüística. Llame al [phone].    We comply with applicable federal civil rights laws and [place] laws. We do not discriminate on the basis of race, color, national origin, age, disability, sex, sexual orientation, or gender identity.            After Visit Summary       This is your " record. Keep this with you and show to your community pharmacist(s) and doctor(s) at your next visit.

## 2018-10-07 NOTE — ED NOTES
Patient states he woke up around 0430 this morning and he states he was soaking wet from sweating. States he also had chest tightness across his chest. States he did get up to use the bathroom and denies increase in tightness with this activity.  States he did call for ambulance right away because he has never had pain like this before. Upon arrival here, he states his chest is feeling better. Rates pain at 4/10 currently and states it was probably at 6/10 at home.  He states he did not take anything at home for the pain, but he did receive Baby ASA 81 mg x 4 per EMS.

## 2018-10-23 ENCOUNTER — TRANSFERRED RECORDS (OUTPATIENT)
Dept: HEALTH INFORMATION MANAGEMENT | Facility: HOSPITAL | Age: 83
End: 2018-10-23

## 2018-10-23 LAB
ALBUMIN (URINE) MG/SPEC: 7.4 MG/DL
ALBUMIN/CREATININE RATIO: 60 (ref 0–29)
CREAT SERPL-MCNC: 1.13 MG/DL (ref 0.7–1.2)
CREATININE (URINE): 124 MG/DL
GFR SERPL CREATININE-BSD FRML MDRD: >60 ML/MIN/1.73M2
GLUCOSE SERPL-MCNC: 149 MG/DL (ref 70–99)
HBA1C MFR BLD: 6.9 % (ref 4–5.6)
POTASSIUM SERPL-SCNC: 4.2 MEQ/L (ref 3.4–5.1)

## 2018-11-15 ENCOUNTER — APPOINTMENT (OUTPATIENT)
Dept: GENERAL RADIOLOGY | Facility: HOSPITAL | Age: 83
End: 2018-11-15
Attending: FAMILY MEDICINE
Payer: MEDICARE

## 2018-11-15 ENCOUNTER — HOSPITAL ENCOUNTER (EMERGENCY)
Facility: HOSPITAL | Age: 83
Discharge: HOME | End: 2018-11-15
Attending: FAMILY MEDICINE | Admitting: FAMILY MEDICINE
Payer: MEDICARE

## 2018-11-15 ENCOUNTER — APPOINTMENT (OUTPATIENT)
Dept: CT IMAGING | Facility: HOSPITAL | Age: 83
End: 2018-11-15
Attending: FAMILY MEDICINE
Payer: MEDICARE

## 2018-11-15 VITALS
OXYGEN SATURATION: 95 % | HEIGHT: 70 IN | DIASTOLIC BLOOD PRESSURE: 76 MMHG | HEART RATE: 46 BPM | RESPIRATION RATE: 18 BRPM | SYSTOLIC BLOOD PRESSURE: 124 MMHG | WEIGHT: 195 LBS | BODY MASS INDEX: 27.92 KG/M2 | TEMPERATURE: 97.9 F

## 2018-11-15 DIAGNOSIS — R07.89 ATYPICAL CHEST PAIN: ICD-10-CM

## 2018-11-15 DIAGNOSIS — K80.00 CALCULUS OF GALLBLADDER WITH ACUTE CHOLECYSTITIS WITHOUT OBSTRUCTION: ICD-10-CM

## 2018-11-15 DIAGNOSIS — K29.00 ACUTE GASTRITIS WITHOUT HEMORRHAGE, UNSPECIFIED GASTRITIS TYPE: ICD-10-CM

## 2018-11-15 LAB
ALBUMIN SERPL-MCNC: 3.4 G/DL (ref 3.4–5)
ALP SERPL-CCNC: 75 U/L (ref 40–150)
ALT SERPL W P-5'-P-CCNC: 21 U/L (ref 0–70)
ANION GAP SERPL CALCULATED.3IONS-SCNC: 12 MMOL/L (ref 3–14)
APTT PPP: 26 SEC (ref 24–37)
AST SERPL W P-5'-P-CCNC: 25 U/L (ref 0–45)
BASOPHILS # BLD AUTO: 0.1 10E9/L (ref 0–0.2)
BASOPHILS NFR BLD AUTO: 0.8 %
BILIRUB SERPL-MCNC: 0.4 MG/DL (ref 0.2–1.3)
BUN SERPL-MCNC: 23 MG/DL (ref 7–30)
CALCIUM SERPL-MCNC: 8.9 MG/DL (ref 8.5–10.1)
CHLORIDE SERPL-SCNC: 104 MMOL/L (ref 94–109)
CO2 SERPL-SCNC: 25 MMOL/L (ref 20–32)
CREAT SERPL-MCNC: 1.18 MG/DL (ref 0.66–1.25)
D DIMER PPP DDU-MCNC: 242 NG/ML D-DU (ref 0–300)
DIFFERENTIAL METHOD BLD: ABNORMAL
EOSINOPHIL # BLD AUTO: 0.4 10E9/L (ref 0–0.7)
EOSINOPHIL NFR BLD AUTO: 3.8 %
ERYTHROCYTE [DISTWIDTH] IN BLOOD BY AUTOMATED COUNT: 12.7 % (ref 10–15)
GFR SERPL CREATININE-BSD FRML MDRD: 58 ML/MIN/1.7M2
GLUCOSE SERPL-MCNC: 201 MG/DL (ref 70–99)
HCT VFR BLD AUTO: 35.5 % (ref 40–53)
HGB BLD-MCNC: 12 G/DL (ref 13.3–17.7)
IMM GRANULOCYTES # BLD: 0 10E9/L (ref 0–0.4)
IMM GRANULOCYTES NFR BLD: 0.4 %
INR PPP: 1.18 (ref 0.8–1.2)
LIPASE SERPL-CCNC: 159 U/L (ref 73–393)
LYMPHOCYTES # BLD AUTO: 1.9 10E9/L (ref 0.8–5.3)
LYMPHOCYTES NFR BLD AUTO: 21.2 %
MAGNESIUM SERPL-MCNC: 1.8 MG/DL (ref 1.6–2.3)
MCH RBC QN AUTO: 30.8 PG (ref 26.5–33)
MCHC RBC AUTO-ENTMCNC: 33.8 G/DL (ref 31.5–36.5)
MCV RBC AUTO: 91 FL (ref 78–100)
MONOCYTES # BLD AUTO: 0.6 10E9/L (ref 0–1.3)
MONOCYTES NFR BLD AUTO: 6.4 %
NEUTROPHILS # BLD AUTO: 6.2 10E9/L (ref 1.6–8.3)
NEUTROPHILS NFR BLD AUTO: 67.4 %
NRBC # BLD AUTO: 0 10*3/UL
NRBC BLD AUTO-RTO: 0 /100
NT-PROBNP SERPL-MCNC: 1016 PG/ML (ref 0–1800)
PLATELET # BLD AUTO: 164 10E9/L (ref 150–450)
POTASSIUM SERPL-SCNC: 4.2 MMOL/L (ref 3.4–5.3)
PROT SERPL-MCNC: 7.3 G/DL (ref 6.8–8.8)
RBC # BLD AUTO: 3.89 10E12/L (ref 4.4–5.9)
SODIUM SERPL-SCNC: 141 MMOL/L (ref 133–144)
TROPONIN I SERPL-MCNC: 0.02 UG/L (ref 0–0.04)
TROPONIN I SERPL-MCNC: <0.015 UG/L (ref 0–0.04)
WBC # BLD AUTO: 9.2 10E9/L (ref 4–11)

## 2018-11-15 PROCEDURE — 36415 COLL VENOUS BLD VENIPUNCTURE: CPT | Performed by: FAMILY MEDICINE

## 2018-11-15 PROCEDURE — 96361 HYDRATE IV INFUSION ADD-ON: CPT

## 2018-11-15 PROCEDURE — 83690 ASSAY OF LIPASE: CPT | Performed by: FAMILY MEDICINE

## 2018-11-15 PROCEDURE — 80053 COMPREHEN METABOLIC PANEL: CPT | Performed by: FAMILY MEDICINE

## 2018-11-15 PROCEDURE — 99284 EMERGENCY DEPT VISIT MOD MDM: CPT | Mod: Z6 | Performed by: FAMILY MEDICINE

## 2018-11-15 PROCEDURE — 93010 ELECTROCARDIOGRAM REPORT: CPT | Performed by: INTERNAL MEDICINE

## 2018-11-15 PROCEDURE — A9270 NON-COVERED ITEM OR SERVICE: HCPCS | Mod: GY | Performed by: FAMILY MEDICINE

## 2018-11-15 PROCEDURE — 85379 FIBRIN DEGRADATION QUANT: CPT | Performed by: FAMILY MEDICINE

## 2018-11-15 PROCEDURE — 25000128 H RX IP 250 OP 636: Performed by: FAMILY MEDICINE

## 2018-11-15 PROCEDURE — 25000132 ZZH RX MED GY IP 250 OP 250 PS 637: Mod: GY | Performed by: FAMILY MEDICINE

## 2018-11-15 PROCEDURE — 83880 ASSAY OF NATRIURETIC PEPTIDE: CPT | Performed by: FAMILY MEDICINE

## 2018-11-15 PROCEDURE — 99285 EMERGENCY DEPT VISIT HI MDM: CPT | Mod: 25

## 2018-11-15 PROCEDURE — 85730 THROMBOPLASTIN TIME PARTIAL: CPT | Performed by: FAMILY MEDICINE

## 2018-11-15 PROCEDURE — 84484 ASSAY OF TROPONIN QUANT: CPT | Performed by: FAMILY MEDICINE

## 2018-11-15 PROCEDURE — 96374 THER/PROPH/DIAG INJ IV PUSH: CPT | Mod: XU

## 2018-11-15 PROCEDURE — 85610 PROTHROMBIN TIME: CPT | Performed by: FAMILY MEDICINE

## 2018-11-15 PROCEDURE — 25000125 ZZHC RX 250: Performed by: FAMILY MEDICINE

## 2018-11-15 PROCEDURE — 93005 ELECTROCARDIOGRAM TRACING: CPT

## 2018-11-15 PROCEDURE — 85025 COMPLETE CBC W/AUTO DIFF WBC: CPT | Performed by: FAMILY MEDICINE

## 2018-11-15 PROCEDURE — 71045 X-RAY EXAM CHEST 1 VIEW: CPT | Mod: TC

## 2018-11-15 PROCEDURE — 96375 TX/PRO/DX INJ NEW DRUG ADDON: CPT | Mod: XU

## 2018-11-15 PROCEDURE — 71275 CT ANGIOGRAPHY CHEST: CPT | Mod: TC

## 2018-11-15 PROCEDURE — 25000128 H RX IP 250 OP 636: Performed by: RADIOLOGY

## 2018-11-15 PROCEDURE — 83735 ASSAY OF MAGNESIUM: CPT | Performed by: FAMILY MEDICINE

## 2018-11-15 RX ORDER — ONDANSETRON 2 MG/ML
4 INJECTION INTRAMUSCULAR; INTRAVENOUS ONCE
Status: COMPLETED | OUTPATIENT
Start: 2018-11-15 | End: 2018-11-15

## 2018-11-15 RX ORDER — MORPHINE SULFATE 4 MG/ML
2 INJECTION, SOLUTION INTRAMUSCULAR; INTRAVENOUS ONCE
Status: COMPLETED | OUTPATIENT
Start: 2018-11-15 | End: 2018-11-15

## 2018-11-15 RX ORDER — IOPAMIDOL 755 MG/ML
75 INJECTION, SOLUTION INTRAVASCULAR ONCE
Status: COMPLETED | OUTPATIENT
Start: 2018-11-15 | End: 2018-11-15

## 2018-11-15 RX ORDER — SODIUM CHLORIDE 9 MG/ML
INJECTION, SOLUTION INTRAVENOUS CONTINUOUS
Status: DISCONTINUED | OUTPATIENT
Start: 2018-11-15 | End: 2018-11-15 | Stop reason: HOSPADM

## 2018-11-15 RX ORDER — PANTOPRAZOLE SODIUM 40 MG/1
40 TABLET, DELAYED RELEASE ORAL DAILY
Qty: 30 TABLET | Refills: 0 | Status: SHIPPED | OUTPATIENT
Start: 2018-11-15 | End: 2018-12-15

## 2018-11-15 RX ORDER — MORPHINE SULFATE 4 MG/ML
INJECTION, SOLUTION INTRAMUSCULAR; INTRAVENOUS
Status: DISCONTINUED
Start: 2018-11-15 | End: 2018-11-15 | Stop reason: HOSPADM

## 2018-11-15 RX ADMIN — LIDOCAINE HYDROCHLORIDE 30 ML: 20 SOLUTION ORAL; TOPICAL at 06:46

## 2018-11-15 RX ADMIN — IOPAMIDOL 75 ML: 755 INJECTION, SOLUTION INTRAVENOUS at 07:29

## 2018-11-15 RX ADMIN — ONDANSETRON 4 MG: 2 INJECTION INTRAMUSCULAR; INTRAVENOUS at 06:31

## 2018-11-15 RX ADMIN — MORPHINE SULFATE 2 MG: 4 INJECTION INTRAVENOUS at 05:28

## 2018-11-15 RX ADMIN — SODIUM CHLORIDE: 9 INJECTION, SOLUTION INTRAVENOUS at 05:09

## 2018-11-15 ASSESSMENT — ENCOUNTER SYMPTOMS
COUGH: 0
ABDOMINAL PAIN: 0
AICD PROBLEM: 0
TROUBLE SWALLOWING: 0
SHORTNESS OF BREATH: 1
PALPITATIONS: 0
FATIGUE: 0
VOMITING: 0
FEVER: 0
LOWER EXTREMITY EDEMA: 0
CLAUDICATION: 0
NEAR-SYNCOPE: 0
ORTHOPNEA: 0
WEAKNESS: 0
ALTERED MENTAL STATUS: 0
DIAPHORESIS: 1
SYNCOPE: 0
DIZZINESS: 1
BACK PAIN: 0
NUMBNESS: 0
HEADACHES: 0
PND: 0
NAUSEA: 0
HEARTBURN: 0
ANOREXIA: 0

## 2018-11-15 NOTE — ED NOTES
Reviewed discharge instructions with pt and spouse, verbalized understanding. Did educate multiple times to hold metformin for 2 days and have labs drawn prior, also encouraged to call PCP, copy of AVS given prior to leaving.

## 2018-11-15 NOTE — ED AVS SNAPSHOT
[facility]    [address] [address]    Phone:  [phone]                                       Jeronimo Payton   MRN: [id number]    Department:  [facility]   Date of Visit:  11/15/2018           After Visit Summary Signature Page     I have received my discharge instructions, and my questions have been answered. I have discussed any challenges I see with this plan with the nurse or doctor.    ..........................................................................................................................................  Patient/Patient Representative Signature      ..........................................................................................................................................  Patient Representative Print Name and Relationship to Patient    ..................................................               ................................................  Date                                   Time    ..........................................................................................................................................  Reviewed by Signature/Title    ...................................................              ..............................................  Date                                               Time          22EPIC Rev 08/18

## 2018-11-15 NOTE — ED AVS SNAPSHOT
[facility]    [address] [place] [id number]    Phone:  [phone]                                       Jeronimo Payton   MRN: [id number]    Department:  [facility]   Date of Visit:  11/15/2018           Patient Information     Date Of Birth          6/29/1927        Your diagnoses for this visit were:     Atypical chest pain     Acute gastritis without hemorrhage, unspecified gastritis type        You were seen by Kalyani Mayorga MD and Mónica No MD.      Follow-up Information     Follow up with Jim Mchugh MD.    Specialty:  Family Practice    Why:  for ER follow up     Contact information:    [facility]  [address] [place] [id number] [phone]          Discharge Instructions           Gastritis (Adult)    Gastritis is inflammation and irritation of the stomach lining. You can have it for a short time (acute) or be long lasting (chronic). Infection with bacteria called H pylori most often causes gastritis. More than a third of people in the  have these bacteria in their bodies. In many cases, H pylori causes no problems or symptoms. In some people, though, the infection irritates the stomach lining and causes gastritis. H. pylori may be diagnosed through blood, stool, or breath tests, we well as through biopsy during an endoscopy. Other causes of stomach irritation include drinking alcohol, smoking or chewing tobacco, or taking pain-relieving medicines called NSAIDs (such as aspirin or ibuprofen). Certain drugs (such as cocaine) and immune conditions can also cause gastritis.  Symptoms of gastritis can include:    Belly pain or bloating    Feeling full quickly    Loss of appetite    Nausea or vomiting    Vomiting blood or having black stools    Feeling more tired than usual  An inflamed and irritated stomach lining is more likely to develop a sore called an ulcer. To help prevent this, gastritis should be treated.  Home  care  If needed, our healthcare provider may prescribe medicines. If you have H pylori infection, treating it will likely relieve your symptoms. Other changes can help reduce stomach irritation and help it heal.    If you have been prescribed medicines for H pylori infection, take them as directed. Take all of the medicine until it is finished or your healthcare provider tells you to stop, even if you feel better.    Your healthcare provider may advise you not to take NSAIDs. If you take daily aspirin for your heart or other medical reasons, do not stop without talking to your healthcare provider first.    Don't drink alcohol.    Stop smoking. Smoking can irritate the stomach and delay healing. As much as possible, stay away from second hand smoke.  Follow-up care  Follow up with your healthcare provider, or as advised by our staff. You may need testing to check for inflammation or an ulcer.  When to seek medical advice  Call your healthcare provider for any of the following:    Stomach pain that gets worse or moves to the lower right belly (appendix area)    Chest pain that appears or gets worse, or spreads to the back, neck, shoulder, or arm    Frequent vomiting (can t keep down liquids)    Blood in the stool or vomit (red or black in color)    Feeling weak or dizzy    Shortness of breath    Unexplained weight loss    Fever of 100.4 F (38 C) or higher, or as directed by your healthcare provider  Date Last Reviewed: 3/1/2018    [id number] [employer]. [address] [place] [address], [place] [address]. All rights reserved. This information is not intended as a substitute for professional medical care. Always follow your healthcare professional's instructions.      DO NO TAKE YOUR METFORMIN FOR THE NEXT 2 DAYS, DUE TO THE CONTRAST YOU RECEIVED, YOU NEED TO HAVE YOUR LABS DRAWN PRIOR TO TAKING YOUR METFORMIN AGAIN.         What to expect when you have contrast    During your exam, we will inject  contrast  into  your vein or artery. (Contrast is a clear liquid with iodine in it. It shows up on X-rays.)    You may feel warm or hot. You may have a metal taste in your mouth and a slight upset stomach. You may also feel pressure near the kidneys and bladder. These effects will last about 1 to 3 minutes.    Please tell us if you have:    Sneezing     Itching    Hives     Swelling in the face    A hoarse voice    Breathing problems    Other new symptoms    Serious problems are rare.  They may include:    Irregular heartbeat     Seizures    Kidney failure              Tissue damage    Shock      Death    If you have any problems during the exam, we  will treat them right away.    When you get home    Call your hospital if you have any new symptoms in the next 2 days, like hives or swelling. (Phone numbers are at the bottom of this page.) Or call your family doctor.     If you have wheezing or trouble breathing, call 911.    Self-care      The contrast will pass out of your body in your  Urine(pee). This will happen in the next 24 hours. You  will not feel this. Your urine will not  change color.    If you have kidney problems or take metformin    Drink 4 to 8 large glasses of water for the next  2 days, if you are not on a fluid restriction.        ?Your kidney function tests are abnormal.  If you take Metformin, do not take it for 48 hours. Please go to your clinic for a blood test within 3 days after your exam before the restarting this medicine.     (Note to provider:please give patient prescription for lab tests.)    ?Special instructions: FOLLOW METFORMIN INSTRUCTIONS ABOVE    I have read and understand the above information.    Patient Sign Here:______________________________________Date:________Time:______    Staff Sign Here:________________________________________Date:_______Time:______      Radiology Departments:     ?[facility]: [phone] ?[facility]: [phone]     ?[place]: [phone] ?[facility]:[phone]       ?[facility]: [phone]  ?[facility]: [phone]  ?[facility]:[phone]    ?[facility]:[phone]  ?[facility]:[phone]       Review of your medicines      START taking        Dose / Directions Last dose taken    pantoprazole 40 MG EC tablet   Commonly known as:  PROTONIX   Dose:  40 mg   Quantity:  30 tablet        Take 1 tablet (40 mg) by mouth daily for 30 doses   Refills:  0          Our records show that you are taking the medicines listed below. If these are incorrect, please call your family doctor or clinic.        Dose / Directions Last dose taken    ADVIL 200 MG capsule   Generic drug:  ibuprofen        Refills:  0        aspirin 81 MG tablet        Refills:  0        finasteride 5 MG tablet   Commonly known as:  PROSCAR        TAKE 1 TABLET BY MOUTH DAILY   Refills:  0        lisinopril 40 MG tablet   Commonly known as:  PRINIVIL/ZESTRIL        TAKE ONE TABLET BY MOUTH ONCE DAILY   Refills:  0        metFORMIN 500 MG 24 hr tablet   Commonly known as:  GLUCOPHAGE-XR        Take 1,000 mg in AM and 500 mg in PM. Swallow tablet whole; do not crush, divide or chew.   Refills:  0        Multi-vitamin Tabs tablet   Dose:  1 tablet        Take 1 tablet by mouth daily   Refills:  0        vitamin D 2000 units Caps   Dose:  1 capsule        Take 1 capsule by mouth daily   Refills:  0                Prescriptions were sent or printed at these locations (1 Prescription)                   [facility]  [place], [place] - [address] [address]   [address], [place] [address]    Telephone:  [phone]   Fax:  [phone]   Hours:                  E-Prescribed (1 of 1)         pantoprazole (PROTONIX) 40 MG EC tablet                Procedures and tests performed during your visit     Procedure/Test Number of Times Performed    CBC with platelets differential 1    CTA Chest with Contrast 1    Cardiac Continuous Monitoring 1    Comprehensive metabolic panel 1    D-Dimer ([facility]) 1    EKG 12-lead, tracing  "only 1    INR 1    Lipase 1    Magnesium 1    Nt probnp inpatient (BNP) 1    Partial thromboplastin time 1    Peripheral IV: Standard 1    Pulse oximetry nursing 1    Review medications with patient 1    Troponin I 2    XR Chest Port 1 View 1      Orders Needing Specimen Collection     None      Pending Results     Date and Time Order Name Status Description    11/15/2018 0703 CTA Chest with Contrast In process             Pending Culture Results     No orders found from 2018 to 2018.            Thank you for choosing [facility]       Thank you for choosing [facility] for your care. Our goal is always to provide you with excellent care. Hearing back from our patients is one way we can continue to improve our services. Please take a few minutes to complete the written survey that you may receive in the mail after you visit with us. Thank you!        [employer] Information     [facility] lets you send messages to your doctor, view your test results, renew your prescriptions, schedule appointments and more. To sign up, go to [email or web address] . Click on \"Log in\" on the left side of the screen, which will take you to the Welcome page. Then click on \"Sign up Now\" on the right side of the page.     You will be asked to enter the access code listed below, as well as some personal information. Please follow the directions to create your username and password.     Your access code is: [email or web address]  Expires: 2019  9:59 AM     Your access code will  in 90 days. If you need help or a new code, please call your [facility] clinic or [phone].        Care EveryWhere ID     This is your Care EveryWhere ID. This could be used by other organizations to access your [facility] medical records  [id number]        Equal Access to Services     [facility] ELIECER : Michael Augustin, carlo trejo, tony taylor. So [facility] [phone].    ATENCIÓN: Si habla " español, tiene a jack disposición servicios gratuitos de asistencia lingüística. Dorothy al [phone].    We comply with applicable federal civil rights laws and [place] laws. We do not discriminate on the basis of race, color, national origin, age, disability, sex, sexual orientation, or gender identity.            After Visit Summary       This is your record. Keep this with you and show to your community pharmacist(s) and doctor(s) at your next visit.

## 2018-11-15 NOTE — ED PROVIDER NOTES
"  History     Chief Complaint   Patient presents with     Chest Pain     \"woke up at 0200 with chest pain\"     Patient is a 89 year old male presenting with chest pain.   Chest Pain   Pain location:  Substernal area  Pain quality: tightness    Pain radiates to:  Does not radiate  Pain severity:  Moderate  Onset quality:  Sudden  Duration:  4 hours  Timing:  Constant  Progression:  Worsening  Chronicity:  Recurrent (similar episode 1 month ago )  Context: at rest    Relieved by:  Nothing  Worsened by:  Nothing  Ineffective treatments:  Aspirin  Associated symptoms: diaphoresis, dizziness and shortness of breath    Associated symptoms: no abdominal pain, no AICD problem, no altered mental status, no anorexia, no anxiety, no back pain, no claudication, no cough, no dysphagia, no fatigue, no fever, no headache, no heartburn, no lower extremity edema, no nausea, no near-syncope, no numbness, no orthopnea, no palpitations, no PND, no syncope, no vomiting and no weakness    Risk factors: diabetes mellitus, hypertension and male sex    Risk factors: no aortic disease, no birth control, no coronary artery disease, no Kierra-Danlos syndrome, no high cholesterol, no immobilization, no Marfan's syndrome, not obese, not pregnant, no prior DVT/PE, no smoking and no surgery      Jeronimo Payton is a 89 year old male who presents with co     Problem List:    There are no active problems to display for this patient.       Past Medical History:    No past medical history on file.    Past Surgical History:    No past surgical history on file.    Family History:    No family history on file.    Social History:  Marital Status:   [2]  Social History   Substance Use Topics     Smoking status: Never Smoker     Smokeless tobacco: Never Used     Alcohol use Yes      Comment: beer occasionally        Medications:      aspirin 81 MG tablet   Cholecalciferol (VITAMIN D) 2000 UNITS CAPS   finasteride (PROSCAR) 5 MG tablet   ibuprofen " (ADVIL) 200 MG capsule   lisinopril (PRINIVIL/ZESTRIL) 40 MG tablet   metFORMIN (GLUCOPHAGE-XR) 500 MG 24 hr tablet   multivitamin, therapeutic with minerals (MULTI-VITAMIN) TABS tablet         Review of Systems   Constitutional: Positive for diaphoresis. Negative for fatigue and fever.   HENT: Negative for trouble swallowing.    Respiratory: Positive for shortness of breath. Negative for cough.    Cardiovascular: Positive for chest pain. Negative for palpitations, orthopnea, claudication, syncope, PND and near-syncope.   Gastrointestinal: Negative for abdominal pain, anorexia, heartburn, nausea and vomiting.   Musculoskeletal: Negative for back pain.   Neurological: Positive for dizziness. Negative for weakness, numbness and headaches.       Physical Exam          Physical Exam   Constitutional: He is oriented to person, place, and time. He appears well-developed and well-nourished. No distress.   HENT:   Head: Normocephalic and atraumatic.   Mouth/Throat: Oropharynx is clear and moist.   Eyes: Pupils are equal, round, and reactive to light.   Neck: Normal range of motion. Neck supple. No JVD present. No tracheal deviation present. No thyromegaly present.   Cardiovascular: Regular rhythm, normal heart sounds and intact distal pulses.  Exam reveals no gallop and no friction rub.    No murmur heard.  Bradycardia    Pulmonary/Chest: Effort normal and breath sounds normal. No stridor. No respiratory distress. He has no wheezes. He has no rales. He exhibits no tenderness.   Abdominal: Soft. Bowel sounds are normal. He exhibits no distension and no mass. There is no tenderness. There is no rebound and no guarding.   Musculoskeletal: Normal range of motion. He exhibits no edema, tenderness or deformity.   Lymphadenopathy:     He has no cervical adenopathy.   Neurological: He is alert and oriented to person, place, and time.   Skin: Skin is warm and dry. He is not diaphoretic.   Nursing note and vitals reviewed.      ED  Course     ED Course     Procedures              Patient present to ER with concern of chest pain that woke him up in the middle of the night. Patient arrived by EMS . EMS report taken. Patient received nitroglycerin in route without any relief in discomfort but his sbp did drop into the 80s. Patient rating pain 5/10 upon arrival . Vital signs reviewed Patient with sinus bradycardia, hypotension .  EKG done. Cardiac monitors placed. EKG showing sinus bradycardia but no other acute ST /T segment changes. Labs ad diagnostics ordered. MOrphine 2 mg given without improvement in symptoms. INitial labs without any sigificant abnormalities. Patient with nausea and emesis following morphine. Zofran given . GI cocktail given, again without improvement in symptom . CT angio ordered to rule out dissection . NO dissection but exam suspicious for ulceron preliminary study . Discussed with patient .Suggest avoid all NSAIDS . Will start protonix and recommend followup with PCP. Following morning addendum to report returns showing possible cholecystitis recommmending ultrasound follow up    Will contact patient to schedule ultrasound   Results for orders placed or performed during the hospital encounter of 11/15/18   CBC with platelets differential   Result Value Ref [facility]    WBC 9.2 4.0 - 11.0 10e9/L    RBC Count 3.89 (L) 4.4 - 5.9 10e12/L    Hemoglobin 12.0 (L) 13.3 - 17.7 g/dL    Hematocrit 35.5 (L) 40.0 - 53.0 %    MCV 91 78 - 100 fl    MCH 30.8 26.5 - 33.0 pg    MCHC 33.8 31.5 - 36.5 g/dL    RDW 12.7 10.0 - 15.0 %    Platelet Count 164 150 - 450 10e9/L    Diff Method Automated Method     % Neutrophils 67.4 %    % Lymphocytes 21.2 %    % Monocytes 6.4 %    % Eosinophils 3.8 %    % Basophils 0.8 %    % Immature Granulocytes 0.4 %    Nucleated RBCs 0 0 /100    Absolute Neutrophil 6.2 1.6 - 8.3 10e9/L    Absolute Lymphocytes 1.9 0.8 - 5.3 10e9/L    Absolute Monocytes 0.6 0.0 - 1.3 10e9/L    Absolute Eosinophils 0.4 0.0 - 0.7  10e9/L    Absolute Basophils 0.1 0.0 - 0.2 10e9/L    Abs Immature Granulocytes 0.0 0 - 0.4 10e9/L    Absolute Nucleated RBC 0.0    INR   Result Value Ref [facility]    INR 1.18 0.80 - 1.20   Partial thromboplastin time   Result Value Ref [facility]    PTT 26 24.00 - 37.00 sec   Comprehensive metabolic panel   Result Value Ref [facility]    Sodium 141 133 - 144 mmol/L    Potassium 4.2 3.4 - 5.3 mmol/L    Chloride 104 94 - 109 mmol/L    Carbon Dioxide 25 20 - 32 mmol/L    Anion Gap 12 3 - 14 mmol/L    Glucose 201 (H) 70 - 99 mg/dL    Urea Nitrogen 23 7 - 30 mg/dL    Creatinine 1.18 0.66 - 1.25 mg/dL    GFR Estimate 58 (L) >60 mL/min/1.7m2    GFR Estimate If Black 70 >60 mL/min/1.7m2    Calcium 8.9 8.5 - 10.1 mg/dL    Bilirubin Total 0.4 0.2 - 1.3 mg/dL    Albumin 3.4 3.4 - 5.0 g/dL    Protein Total 7.3 6.8 - 8.8 g/dL    Alkaline Phosphatase 75 40 - 150 U/L    ALT 21 0 - 70 U/L    AST 25 0 - 45 U/L   Magnesium   Result Value Ref [facility]    Magnesium 1.8 1.6 - 2.3 mg/dL   Lipase   Result Value Ref [facility]    Lipase 159 73 - 393 U/L   Nt probnp inpatient (BNP)   Result Value Ref [facility]    N-Terminal Pro BNP Inpatient 1016 0 - 1800 pg/mL   Troponin I   Result Value Ref [facility]    Troponin I ES 0.019 0.000 - 0.045 ug/L   D-Dimer ([facility],)   Result Value Ref [facility]    D-Dimer ng/mL 242 0 - 300 ng/ml D-DU           No results found for this or any previous visit (from the past 24 hour(s)).    Medications - No data to display    Assessments & Plan (with Medical Decision Making)     I have reviewed the nursing notes.    I have reviewed the findings, diagnosis, plan and need for follow up with the patient.      New Prescriptions    No medications on file       Final diagnoses:   Atypical chest pain   Acute gastritis without hemorrhage, unspecified gastritis type       11/15/2018   [facility]     Kalyani Mayorga MD  11/16/18 0551       Kalyani Mayorga MD  11/16/18 0551

## 2018-11-15 NOTE — DISCHARGE INSTRUCTIONS
Gastritis (Adult)    Gastritis is inflammation and irritation of the stomach lining. You can have it for a short time (acute) or be long lasting (chronic). Infection with bacteria called H pylori most often causes gastritis. More than a third of people in the [place] have these bacteria in their bodies. In many cases, H pylori causes no problems or symptoms. In some people, though, the infection irritates the stomach lining and causes gastritis. H. pylori may be diagnosed through blood, stool, or breath tests, we well as through biopsy during an endoscopy. Other causes of stomach irritation include drinking alcohol, smoking or chewing tobacco, or taking pain-relieving medicines called NSAIDs (such as aspirin or ibuprofen). Certain drugs (such as cocaine) and immune conditions can also cause gastritis.  Symptoms of gastritis can include:    Belly pain or bloating    Feeling full quickly    Loss of appetite    Nausea or vomiting    Vomiting blood or having black stools    Feeling more tired than usual  An inflamed and irritated stomach lining is more likely to develop a sore called an ulcer. To help prevent this, gastritis should be treated.  Home care  If needed, our healthcare provider may prescribe medicines. If you have H pylori infection, treating it will likely relieve your symptoms. Other changes can help reduce stomach irritation and help it heal.    If you have been prescribed medicines for H pylori infection, take them as directed. Take all of the medicine until it is finished or your healthcare provider tells you to stop, even if you feel better.    Your healthcare provider may advise you not to take NSAIDs. If you take daily aspirin for your heart or other medical reasons, do not stop without talking to your healthcare provider first.    Don't drink alcohol.    Stop smoking. Smoking can irritate the stomach and delay healing. As much as possible, stay away from second hand smoke.  Follow-up care  Follow up  with your healthcare provider, or as advised by our staff. You may need testing to check for inflammation or an ulcer.  When to seek medical advice  Call your healthcare provider for any of the following:    Stomach pain that gets worse or moves to the lower right belly (appendix area)    Chest pain that appears or gets worse, or spreads to the back, neck, shoulder, or arm    Frequent vomiting (can t keep down liquids)    Blood in the stool or vomit (red or black in color)    Feeling weak or dizzy    Shortness of breath    Unexplained weight loss    Fever of 100.4 F (38 C) or higher, or as directed by your healthcare provider  Date Last Reviewed: 3/1/2018    [id number] [employer]. [address] [place] [address], [place] [address]. All rights reserved. This information is not intended as a substitute for professional medical care. Always follow your healthcare professional's instructions.      DO NO TAKE YOUR METFORMIN FOR THE NEXT 2 DAYS, DUE TO THE CONTRAST YOU RECEIVED, YOU NEED TO HAVE YOUR LABS DRAWN PRIOR TO TAKING YOUR METFORMIN AGAIN.         What to expect when you have contrast    During your exam, we will inject  contrast  into your vein or artery. (Contrast is a clear liquid with iodine in it. It shows up on X-rays.)    You may feel warm or hot. You may have a metal taste in your mouth and a slight upset stomach. You may also feel pressure near the kidneys and bladder. These effects will last about 1 to 3 minutes.    Please tell us if you have:    Sneezing     Itching    Hives     Swelling in the face    A hoarse voice    Breathing problems    Other new symptoms    Serious problems are rare.  They may include:    Irregular heartbeat     Seizures    Kidney failure              Tissue damage    Shock      Death    If you have any problems during the exam, we  will treat them right away.    When you get home    Call your hospital if you have any new symptoms in the next 2 days, like hives or swelling.  (Phone numbers are at the bottom of this page.) Or call your family doctor.     If you have wheezing or trouble breathing, call 911.    Self-care      The contrast will pass out of your body in your  Urine(pee). This will happen in the next 24 hours. You  will not feel this. Your urine will not  change color.    If you have kidney problems or take metformin    Drink 4 to 8 large glasses of water for the next  2 days, if you are not on a fluid restriction.        ?Your kidney function tests are abnormal.  If you take Metformin, do not take it for 48 hours. Please go to your clinic for a blood test within 3 days after your exam before the restarting this medicine.     (Note to provider:please give patient prescription for lab tests.)    ?Special instructions: FOLLOW METFORMIN INSTRUCTIONS ABOVE    I have read and understand the above information.    Patient Sign Here:______________________________________Date:________Time:______    Staff Sign Here:________________________________________Date:_______Time:______      Radiology Departments:     ?[facility]: [phone] ?[place]: [phone]     ?[place]: [phone] ?[facility]:[phone]      ?[facility]: [phone]  ?[facility]: [phone]  ?[facility]:[phone]    ?[facility]:[phone]  ?[facility]:[phone]

## 2018-11-16 NOTE — ED NOTES
Spoke with patient regarding CT results and informed patient that ultrasound would be needed.   Patient verbalizes understanding.  Dr. Leach noted an order, not found by myself or in DI scheduling cue.    Per Dr. No order placed for RUQ ultrasound.

## 2018-11-16 NOTE — PROGRESS NOTES
CTA Chest with Contrast   IMPRESSION:   Good quality examination demonstrates no evidence of aortic  dissection.    Radiodense gallstones with suggestion of small volume of  pericholecystic fluid near the neck of the gallbladder. Ultrasound  evaluation would better characterize. Cannot exclude acute  cholecystitis.     Chronic changes throughout the lungs including mild paraseptal  emphysema, mild interstitial thickening and scarring at the lung  Bases.    Result discussed with Dr. No.  She will clarify with Dr. Leach whether ultrasound follow up has been facilitated and patient has been notified.

## 2019-04-23 ENCOUNTER — TRANSFERRED RECORDS (OUTPATIENT)
Dept: HEALTH INFORMATION MANAGEMENT | Facility: HOSPITAL | Age: 84
End: 2019-04-23

## 2019-04-23 LAB
ALBUMIN (URINE) MG/SPEC: 7.8 MG/DL
ALBUMIN/CREATININE RATIO: 65 (ref 0–29)
ALT SERPL-CCNC: 16 IU/L (ref 6–40)
AST SERPL-CCNC: 25 IU/L (ref 10–40)
CHOLEST SERPL-MCNC: 189 MG/DL (ref 114–200)
CREAT SERPL-MCNC: 1.15 MG/DL (ref 0.7–1.2)
CREATININE (URINE): 120 MG/DL
GFR SERPL CREATININE-BSD FRML MDRD: 60 ML/MIN/1.73M2
GLUCOSE SERPL-MCNC: 134 MG/DL (ref 70–99)
HBA1C MFR BLD: 6.5 % (ref 4–5.6)
HDLC SERPL-MCNC: 55 MG/DL (ref 40–60)
LDLC SERPL CALC-MCNC: 113 MG/DL
POTASSIUM SERPL-SCNC: 4.2 MEQ/L (ref 3.4–5.1)
TRIGL SERPL-MCNC: 105 MG/DL (ref 10–200)

## 2019-06-12 ENCOUNTER — APPOINTMENT (OUTPATIENT)
Dept: CT IMAGING | Facility: HOSPITAL | Age: 84
End: 2019-06-12
Attending: EMERGENCY MEDICINE
Payer: MEDICARE

## 2019-06-12 ENCOUNTER — HOSPITAL ENCOUNTER (EMERGENCY)
Facility: HOSPITAL | Age: 84
Discharge: HOME | End: 2019-06-12
Attending: EMERGENCY MEDICINE | Admitting: EMERGENCY MEDICINE
Payer: MEDICARE

## 2019-06-12 VITALS
BODY MASS INDEX: 27.92 KG/M2 | HEART RATE: 70 BPM | OXYGEN SATURATION: 100 % | HEIGHT: 70 IN | TEMPERATURE: 98 F | WEIGHT: 195 LBS | SYSTOLIC BLOOD PRESSURE: 147 MMHG | RESPIRATION RATE: 18 BRPM | DIASTOLIC BLOOD PRESSURE: 82 MMHG

## 2019-06-12 DIAGNOSIS — S00.83XA TRAUMATIC HEMATOMA OF FOREHEAD, INITIAL ENCOUNTER: Primary | ICD-10-CM

## 2019-06-12 DIAGNOSIS — R29.6 FALL IN ELDERLY PATIENT: ICD-10-CM

## 2019-06-12 PROCEDURE — 99283 EMERGENCY DEPT VISIT LOW MDM: CPT | Mod: Z6 | Performed by: EMERGENCY MEDICINE

## 2019-06-12 PROCEDURE — A9270 NON-COVERED ITEM OR SERVICE: HCPCS | Mod: GY | Performed by: EMERGENCY MEDICINE

## 2019-06-12 PROCEDURE — 72125 CT NECK SPINE W/O DYE: CPT | Mod: TC

## 2019-06-12 PROCEDURE — 99284 EMERGENCY DEPT VISIT MOD MDM: CPT | Mod: 25

## 2019-06-12 PROCEDURE — 70450 CT HEAD/BRAIN W/O DYE: CPT | Mod: TC

## 2019-06-12 PROCEDURE — 25000132 ZZH RX MED GY IP 250 OP 250 PS 637: Mod: GY | Performed by: EMERGENCY MEDICINE

## 2019-06-12 RX ORDER — AMOXICILLIN 500 MG/1
CAPSULE ORAL
Refills: 1 | COMMUNITY
Start: 2019-04-11 | End: 2021-07-28

## 2019-06-12 RX ORDER — LANOLIN ALCOHOL/MO/W.PET/CERES
1000 CREAM (GRAM) TOPICAL DAILY
COMMUNITY
End: 2021-07-28

## 2019-06-12 RX ORDER — LISINOPRIL 20 MG/1
20 TABLET ORAL DAILY
Refills: 1 | COMMUNITY
Start: 2019-05-31 | End: 2020-10-13

## 2019-06-12 RX ORDER — ACETAMINOPHEN 325 MG/1
650 TABLET ORAL ONCE
Status: COMPLETED | OUTPATIENT
Start: 2019-06-12 | End: 2019-06-12

## 2019-06-12 RX ADMIN — ACETAMINOPHEN 650 MG: 325 TABLET, FILM COATED ORAL at 14:23

## 2019-06-12 ASSESSMENT — MIFFLIN-ST. JEOR: SCORE: 1545.76

## 2019-06-12 ASSESSMENT — ENCOUNTER SYMPTOMS
SHORTNESS OF BREATH: 0
FEVER: 0
ABDOMINAL PAIN: 0

## 2019-06-12 NOTE — ED PROVIDER NOTES
History     Chief Complaint   Patient presents with     Fall     hematoma rt forehead. pt was walking and lost his balance and fell, notes hits his head on concrete. no loss of consciousness or neck pain. denies h/a, pt alert and answering questions appropriately.     Muscle Pain     rt chest muscular pain, notes raised his arm to stop his fall.     HPI  Jeronimo Payton is a 89 year old male who presented to the emergency department after mechanical fall at [facility].  Patient was trying to reach the shopping tray that was being wheeled by the spouse and then he misjudged the distance and fell down hitting the forehead against concrete floor.  Sustained a large bruise just above the right eyebrow.  No loss of consciousness.  Patient takes baby aspirin every day.  No chest pain, palpitations, nausea, vomiting or unilateral body weakness.  No visual changes.    Allergies:  Allergies   Allergen Reactions     Alatrofloxacin      No Clinical Screening - See Comments      Some kind of anesthetic       Problem List:    There are no active problems to display for this patient.       Past Medical History:    No past medical history on file.    Past Surgical History:    No past surgical history on file.    Family History:    No family history on file.    Social History:  Marital Status:   [2]  Social History     Tobacco Use     Smoking status: Never Smoker     Smokeless tobacco: Never Used   Substance Use Topics     Alcohol use: Yes     Comment: beer occasionally     Drug use: No        Medications:      aspirin 81 MG tablet   Cholecalciferol (VITAMIN D) 2000 UNITS CAPS   cyanocobalamin (VITAMIN B-12) 1000 MCG tablet   finasteride (PROSCAR) 5 MG tablet   lisinopril (PRINIVIL/ZESTRIL) 20 MG tablet   metFORMIN (GLUCOPHAGE-XR) 500 MG 24 hr tablet   amoxicillin (AMOXIL) 500 MG capsule   ibuprofen (ADVIL) 200 MG capsule         Review of Systems   Constitutional: Negative for fever.   Respiratory: Negative for shortness of  "breath.    Cardiovascular: Negative for chest pain.   Gastrointestinal: Negative for abdominal pain.   All other systems reviewed and are negative.      Physical Exam   BP: 157/93  Pulse: 70  Heart Rate: 62  Temp: 98  F (36.7  C)  Resp: 16  Height: 177.8 cm (5' 10\")  Weight: 88.5 kg (195 lb)  SpO2: 99 %      Physical Exam   Constitutional: He is oriented to person, place, and time. He appears well-developed and well-nourished. No distress.   HENT:   Head: Normocephalic.       Eyes: Pupils are equal, round, and reactive to light. EOM are normal.   Cardiovascular: Normal rate, regular rhythm, normal heart sounds and intact distal pulses.   Pulmonary/Chest: Effort normal and breath sounds normal. No stridor. No respiratory distress.   Abdominal: Bowel sounds are normal. He exhibits no distension. There is no tenderness.   Musculoskeletal: He exhibits no edema, tenderness or deformity.   Neurological: He is alert and oriented to person, place, and time. No cranial nerve deficit.   Skin: He is not diaphoretic.   Nursing note and vitals reviewed.      ED Course        Procedures        Results for orders placed or performed during the hospital encounter of 06/12/19 (from the past 24 hour(s))   Cervical spine CT w/o contrast    Narrative    PROCEDURE: CT CERVICAL SPINE W/O CONTRAST 6/12/2019 1:28 PM    HISTORY: Head trauma after mechanical fall.    COMPARISONS: None.    Meds/Dose Given:    TECHNIQUE: CT scan of the cervical spine with sagittal coronal  reconstructions    FINDINGS: There are severe cervical degenerative changes advanced  cervical facet joint degenerative changes are noted. There are severe  degenerative changes of the middle atlantoaxial joint. Anterior  osteophytes are seen throughout the cervical spine. There are no  fractures of the liver bodies or arches. There is atherosclerotic  calcification at the carotid bifurcations. Otherwise, the prevertebral  soft tissues appear normal         Impression    " IMPRESSION: Cervical degenerative changes no acute fracture.    DEAN ALTAMIRANO MD   CT Head w/o Contrast    Narrative    PROCEDURE: CT HEAD W/O CONTRAST     HISTORY: Mechanical fall with hematoma on the forehead.    COMPARISON: January 2018    TECHNIQUE:  Helical images of the head from the foramen magnum to the  vertex were obtained without contrast.    FINDINGS: The ventricles and sulci are enlarged consistent with  atrophy. No acute intracranial hemorrhage, mass effect, midline shift,  hydrocephalus or basilar cystern effacement are present.    The grey-white matter interface is preserved. There is white matter  low-density both hemispheres consistent with small vessel disease.  Small vessel changes are also seen in the brainstem.    The calvarium is intact. The mastoid air cells are clear.  The  visualized paranasal sinuses are clear. There is a large right  forehead hematoma.      Impression    IMPRESSION: No acute brain injury. Large forehead hematoma.      DEAN ALTAMIRANO MD       Medications   acetaminophen (TYLENOL) tablet 650 mg (650 mg Oral Given 6/12/19 0985)       Assessments & Plan (with Medical Decision Making)   Mechanical fall with right supraorbital hematoma: CT scan of the cervical spine and head did not show any acute changes.  Tylenol given for pain and cold packs applied to the hematoma.  Discharged home in stable condition and advised follow-up with PCP as needed.  Return to ED if condition worsens.    I have reviewed the nursing notes.    I have reviewed the findings, diagnosis, plan and need for follow up with the patient.       Medication List      There are no discharge medications for this visit.         Final diagnoses:   Traumatic hematoma of forehead, initial encounter   Fall in elderly patient       6/12/2019   [facility]     Kelvin Ram MD  06/12/19 5735

## 2019-06-12 NOTE — ED NOTES
The patient arrives via [facility] after a trip and fall when ambulating with his cane while at [facility] with his wife. He denies having dizziness or LOC. His wife witnessed the fall and states no LOC and that the patient was speaking appropriately after the fall. The patient is on daily baby aspirin. Dr. Ram was at the bedside at patient arrival at 1300 for trauma evaluation. The patient arrives with C-collar intact, CMS intact to all 4 extremities. Contusion and abrasion to the right forehead and pain below the right axilla and right chest from the fall.

## 2019-06-12 NOTE — ED NOTES
Patient given verbal and written discharge instructions. Patient verbalized understanding of discharge instructions. c-collar removed per MD orders. CMS intact. C/o headache. GCS 15. Denies dizziness. Awake and alert. Ice to hematoma on forehead.

## 2019-06-12 NOTE — ED AVS SNAPSHOT
[facility]  [address] [address]  Phone:  [phone]                                    Jeronimo Payton   MRN: [id number]    Department:  [facility]   Date of Visit:  6/12/2019           After Visit Summary Signature Page    I have received my discharge instructions, and my questions have been answered. I have discussed any challenges I see with this plan with the nurse or doctor.    ..........................................................................................................................................  Patient/Patient Representative Signature      ..........................................................................................................................................  Patient Representative Print Name and Relationship to Patient    ..................................................               ................................................  Date                                   Time    ..........................................................................................................................................  Reviewed by Signature/Title    ...................................................              ..............................................  Date                                               Time          22EPIC Rev 08/18

## 2019-06-12 NOTE — ED NOTES
Pt is waiting for his son or nieces to pick him up. C/o headache and pressure. Pt requesting tylenol. Updated Dr. Ram.

## 2019-08-02 NOTE — PROGRESS NOTES
SUBJECTIVE:   Jeronimo Payton is a 89 year old male who presents to clinic today for the following   health issues:    New Patient/Transfer of Care  Diabetes Follow-up      How often are you checking your blood sugar? A few times a week    What time of day are you checking your blood sugars (select all that apply)?  Before meals    Have you had any blood sugars above 200?  No    Have you had any blood sugars below 70?  No    What symptoms do you notice when your blood sugar is low?  None    What concerns do you have today about your diabetes? None     Do you have any of these symptoms? (Select all that apply)  No numbness or tingling in feet.  No redness, sores or blisters on feet.  No complaints of excessive thirst.  No reports of blurry vision.  No significant changes to weight.     Have you had a diabetic eye exam in the last 12 months? Yes- Date of last eye exam: about 2 weeks ago saw Steffany    BP Readings from Last 2 Encounters:   06/12/19 147/82   11/15/18 124/76     No results found for: A1C, LDL    Diabetes Management Resources  Hypertension Follow-up      Do you check your blood pressure regularly outside of the clinic? No     Are you following a low salt diet? Yes    Are your blood pressures ever more than 140 on the top number (systolic) OR more   than 90 on the bottom number (diastolic), for example 140/90? No    Amount of exercise or physical activity: None    Problems taking medications regularly: No    Medication side effects: none    Diet: low salt and diabetic      Diabetic foot exam   1. foot deformity   No  2. Current or previous foot ulceration     No  3. Current or previous pre-ulcerative calluses     Yes  4. previous partial amputation of one or both feet or complete amputation of one foot     No  5. peripheral neuropathy with evidence of callus formation     No  6. poor circulation     No    PROBLEMS TO ADD ON...    Additional history: doing well.  No new concerns.  Compliant with meds.   Seeing me with Dr. Mchugh leaving.  Sugars in the 120 range mostly.  No foot troubles.  Lives at home with wife.      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 unsure which was which.       Social History     Tobacco Use     Smoking status: Never Smoker     Smokeless tobacco: Never Used   Substance Use Topics     Alcohol use: Yes     Comment: beer occasionally     Family History   Problem Relation Age of Onset     Diabetes Mother      Cancer Mother          Current Outpatient Medications   Medication Sig Dispense Refill     amoxicillin (AMOXIL) 500 MG capsule Takes one dose prior to dental care.  1     aspirin 81 MG tablet        Cholecalciferol (VITAMIN D) 2000 UNITS CAPS Take 1 capsule by mouth daily        cyanocobalamin (VITAMIN B-12) 1000 MCG tablet Take 1,000 mcg by mouth daily       finasteride (PROSCAR) 5 MG tablet TAKE 1 TABLET BY MOUTH DAILY       ibuprofen (ADVIL) 200 MG capsule        lisinopril (PRINIVIL/ZESTRIL) 20 MG tablet Take 20 mg by mouth daily  1     metFORMIN (GLUCOPHAGE-XR) 500 MG 24 hr tablet Take 1,000 mg in AM and 500 mg in PM. Swallow tablet whole; do not crush, divide or chew.       Allergies   Allergen Reactions     Alatrofloxacin      No Clinical Screening - See Comments      Some kind  "of anesthetic       ROS:  Constitutional, HEENT, cardiovascular, pulmonary, gi and gu systems are negative, except as otherwise noted.    OBJECTIVE:                                                    /82   Pulse 67   Temp 97.6  F (36.4  C) (Tympanic)   Ht 1.778 m (5' 10\")   Wt 84.4 kg (186 lb)   SpO2 99%   BMI 26.69 kg/m    Body mass index is 26.69 kg/m .  GENERAL APPEARANCE: Alert, no acute distress  CV: regular rate and rhythm, no murmur, rub or gallop  RESP: lungs clear to auscultation bilaterally  ABDOMEN: normal bowel sounds, soft, nontender, no hepatosplenomegaly or other masses  SKIN: no suspicious lesions or rashes to visualized skin  NEURO: Alert, oriented x 3, speech and mentation normal  Feet as above.      Labs pending.      ASSESSMENT/PLAN:                                                    1. Controlled type 2 diabetes mellitus without complication, unspecified whether long term insulin use (H)  Doing well.  Update labs.  Great control.  I assumed his cares.   - C FOOT EXAM  NO CHARGE  - Hemoglobin A1c  - Basic metabolic panel  - TSH with free T4 reflex    2. Benign non-nodular prostatic hyperplasia with lower urinary tract symptoms  Stable.  On the proscar.  Sx stable.      3. Essential hypertension  Stable.     4. Hyperlipidemia, unspecified hyperlipidemia type  Stable.      5. 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  Update and follow.        F/u q 6 months.      Eloy Conde MD  [facility]      "

## 2019-08-05 ENCOUNTER — OFFICE VISIT (OUTPATIENT)
Dept: FAMILY MEDICINE | Facility: OTHER | Age: 84
End: 2019-08-05
Attending: FAMILY MEDICINE
Payer: MEDICARE

## 2019-08-05 VITALS
WEIGHT: 186 LBS | HEIGHT: 70 IN | OXYGEN SATURATION: 99 % | SYSTOLIC BLOOD PRESSURE: 126 MMHG | HEART RATE: 67 BPM | TEMPERATURE: 97.6 F | DIASTOLIC BLOOD PRESSURE: 82 MMHG | BODY MASS INDEX: 26.63 KG/M2

## 2019-08-05 DIAGNOSIS — N18.31 CKD STAGE G3A/A2, GFR 45-59 AND ALBUMIN CREATININE RATIO 30-299 MG/G (H): ICD-10-CM

## 2019-08-05 DIAGNOSIS — E78.5 HYPERLIPIDEMIA, UNSPECIFIED HYPERLIPIDEMIA TYPE: ICD-10-CM

## 2019-08-05 DIAGNOSIS — E11.9 CONTROLLED TYPE 2 DIABETES MELLITUS WITHOUT COMPLICATION, UNSPECIFIED WHETHER LONG TERM INSULIN USE (H): Primary | ICD-10-CM

## 2019-08-05 DIAGNOSIS — I10 ESSENTIAL HYPERTENSION: ICD-10-CM

## 2019-08-05 DIAGNOSIS — N40.1 BENIGN NON-NODULAR PROSTATIC HYPERPLASIA WITH LOWER URINARY TRACT SYMPTOMS: ICD-10-CM

## 2019-08-05 PROCEDURE — 36415 COLL VENOUS BLD VENIPUNCTURE: CPT | Mod: ZL | Performed by: FAMILY MEDICINE

## 2019-08-05 PROCEDURE — 80048 BASIC METABOLIC PNL TOTAL CA: CPT | Mod: ZL | Performed by: FAMILY MEDICINE

## 2019-08-05 PROCEDURE — 83036 HEMOGLOBIN GLYCOSYLATED A1C: CPT | Mod: ZL | Performed by: FAMILY MEDICINE

## 2019-08-05 PROCEDURE — 40000788 ZZHCL STATISTIC ESTIMATED AVERAGE GLUCOSE: Mod: ZL | Performed by: FAMILY MEDICINE

## 2019-08-05 PROCEDURE — 84443 ASSAY THYROID STIM HORMONE: CPT | Mod: ZL | Performed by: FAMILY MEDICINE

## 2019-08-05 PROCEDURE — G0463 HOSPITAL OUTPT CLINIC VISIT: HCPCS

## 2019-08-05 PROCEDURE — 99203 OFFICE O/P NEW LOW 30 MIN: CPT | Performed by: FAMILY MEDICINE

## 2019-08-05 ASSESSMENT — PAIN SCALES - GENERAL: PAINLEVEL: NO PAIN (0)

## 2019-08-05 ASSESSMENT — MIFFLIN-ST. JEOR: SCORE: 1499.94

## 2019-08-05 NOTE — NURSING NOTE
"Chief Complaint   Patient presents with     Establish Care       Initial /82   Pulse 67   Temp 97.6  F (36.4  C) (Tympanic)   Ht 1.778 m (5' 10\")   Wt 84.4 kg (186 lb)   SpO2 99%   BMI 26.69 kg/m   Estimated body mass index is 26.69 kg/m  as calculated from the following:    Height as of this encounter: 1.778 m (5' 10\").    Weight as of this encounter: 84.4 kg (186 lb).  Medication Reconciliation: complete     Shante Larson      "

## 2019-08-06 LAB
ANION GAP SERPL CALCULATED.3IONS-SCNC: 7 MMOL/L (ref 3–14)
BUN SERPL-MCNC: 23 MG/DL (ref 7–30)
CALCIUM SERPL-MCNC: 8.9 MG/DL (ref 8.5–10.1)
CHLORIDE SERPL-SCNC: 106 MMOL/L (ref 94–109)
CO2 SERPL-SCNC: 26 MMOL/L (ref 20–32)
CREAT SERPL-MCNC: 1 MG/DL (ref 0.66–1.25)
EST. AVERAGE GLUCOSE BLD GHB EST-MCNC: 143 MG/DL
GFR SERPL CREATININE-BSD FRML MDRD: 65 ML/MIN/{1.73_M2}
GLUCOSE SERPL-MCNC: 110 MG/DL (ref 70–99)
HBA1C MFR BLD: 6.6 % (ref 0–5.6)
POTASSIUM SERPL-SCNC: 4.1 MMOL/L (ref 3.4–5.3)
SODIUM SERPL-SCNC: 139 MMOL/L (ref 133–144)
TSH SERPL DL<=0.005 MIU/L-ACNC: 2.44 MU/L (ref 0.4–4)

## 2019-10-23 ENCOUNTER — IMMUNIZATION (OUTPATIENT)
Dept: FAMILY MEDICINE | Facility: OTHER | Age: 84
End: 2019-10-23
Attending: FAMILY MEDICINE
Payer: MEDICARE

## 2019-10-23 DIAGNOSIS — Z23 NEED FOR PROPHYLACTIC VACCINATION AND INOCULATION AGAINST INFLUENZA: Primary | ICD-10-CM

## 2019-10-23 PROCEDURE — 90662 IIV NO PRSV INCREASED AG IM: CPT

## 2019-10-23 PROCEDURE — G0008 ADMIN INFLUENZA VIRUS VAC: HCPCS

## 2020-01-13 ENCOUNTER — TELEPHONE (OUTPATIENT)
Dept: FAMILY MEDICINE | Facility: OTHER | Age: 85
End: 2020-01-13

## 2020-02-04 NOTE — PROGRESS NOTES
Subjective     Jeronimo Payton is a 89 year old male who presents to clinic today for the following health issues:    HPI   Diabetes Follow-up    How often are you checking your blood sugar? A few times a week  What time of day are you checking your blood sugars (select all that apply)?  Before meals  Have you had any blood sugars above 200?  No  Have you had any blood sugars below 70?  No    What symptoms do you notice when your blood sugar is low?  Weak    What concerns do you have today about your diabetes? None     Do you have any of these symptoms? (Select all that apply)  No numbness or tingling in feet.  No redness, sores or blisters on feet.  No complaints of excessive thirst.  No reports of blurry vision.  No significant changes to weight.    Have you had a diabetic eye exam in the last 12 months? Yes in august with last doctor        BP Readings from Last 2 Encounters:   08/05/19 126/82   06/12/19 147/82     Hemoglobin A1C (%)   Date Value   08/05/2019 6.6 (H)   04/23/2019 6.5 (H)     LDL Cholesterol Calculated (mg/dL)   Date Value   04/23/2019 113         Hypertension Follow-up      Do you check your blood pressure regularly outside of the clinic? No     Are you following a low salt diet? Yes    Are your blood pressures ever more than 140 on the top number (systolic) OR more   than 90 on the bottom number (diastolic), for example 140/90? No      PROBLEMS TO ADD ON...having bilateral calf pain with walking.  Mild overall.  Wanting PT for it.      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 unsure which was which.       Social History     Tobacco Use     Smoking status: Never Smoker     Smokeless tobacco: Never Used   Substance Use Topics     Alcohol use: Yes     Comment: beer occasionally     Family History   Problem Relation Age of Onset     Diabetes Mother      Cancer Mother          Current Outpatient Medications   Medication Sig Dispense Refill     aspirin 81 MG tablet        Cholecalciferol (VITAMIN D) 2000 UNITS CAPS Take 1 capsule by mouth daily        cyanocobalamin (VITAMIN B-12) 1000 MCG tablet Take 1,000 mcg by mouth daily       finasteride (PROSCAR) 5 MG tablet TAKE 1 TABLET BY MOUTH DAILY       ibuprofen (ADVIL) 200 MG capsule        lisinopril (PRINIVIL/ZESTRIL) 20 MG tablet Take 20 mg by mouth daily  1     metFORMIN (GLUCOPHAGE-XR) 500 MG 24 hr tablet Take 1,000 mg in AM and 500 mg in PM. Swallow tablet whole; do not crush, divide or chew.       amoxicillin (AMOXIL) 500 MG capsule Takes one dose prior to dental care.  1     Allergies   Allergen Reactions     Alatrofloxacin      No Clinical Screening - See Comments      Some kind of anesthetic       PROBLEMS TO ADD ON...  Reviewed and updated as needed this visit by Provider         Review of Systems   ROS COMP: Constitutional, HEENT, cardiovascular, pulmonary, gi and gu systems are negative, except as otherwise noted.      Objective    There were no vitals taken for this visit.  There is no height or weight on file to calculate BMI.  Physical Exam   GENERAL: healthy, alert and no distress  NECK: no adenopathy, no asymmetry, masses, or scars and thyroid normal to palpation  RESP: lungs clear to auscultation - no rales, rhonchi or wheezes  CV: regular rate and rhythm, normal S1 S2, no S3 or S4, no murmur, click  "or rub, no peripheral edema and peripheral pulses strong  ABDOMEN: soft, nontender, no hepatosplenomegaly, no masses and bowel sounds normal  MS: no gross musculoskeletal defects noted, no edema    Diagnostic Test Results:  Labs reviewed in Epic        Assessment & Plan       ICD-10-CM    1. Controlled type 2 diabetes mellitus without complication, unspecified whether long term insulin use (H) E11.9 Basic metabolic panel     Hemoglobin A1c   2. Essential hypertension I10    3. 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  Due for dm labs along with the bmp for the kidneys.  Having calf pain bilateral with walking.  We reviewed options and doing the rachel and the PT set up.  Full labs.  Stable presentation.  F/u routine.     BMI:   Estimated body mass index is 27.05 kg/m  as calculated from the following:    Height as of this encounter: 1.733 m (5' 8.21\").    Weight as of this encounter: 81.2 kg (179 lb).               No follow-ups on file.    Eloy Conde MD  [facility]      "

## 2020-02-11 ENCOUNTER — OFFICE VISIT (OUTPATIENT)
Dept: FAMILY MEDICINE | Facility: OTHER | Age: 85
End: 2020-02-11
Attending: FAMILY MEDICINE
Payer: MEDICARE

## 2020-02-11 ENCOUNTER — MEDICAL CORRESPONDENCE (OUTPATIENT)
Dept: HEALTH INFORMATION MANAGEMENT | Facility: CLINIC | Age: 85
End: 2020-02-11

## 2020-02-11 VITALS
HEART RATE: 66 BPM | BODY MASS INDEX: 27.13 KG/M2 | SYSTOLIC BLOOD PRESSURE: 112 MMHG | OXYGEN SATURATION: 99 % | HEIGHT: 68 IN | DIASTOLIC BLOOD PRESSURE: 58 MMHG | WEIGHT: 179 LBS

## 2020-02-11 DIAGNOSIS — N18.31 CKD STAGE G3A/A2, GFR 45-59 AND ALBUMIN CREATININE RATIO 30-299 MG/G (H): ICD-10-CM

## 2020-02-11 DIAGNOSIS — M79.662 BILATERAL CALF PAIN: ICD-10-CM

## 2020-02-11 DIAGNOSIS — M79.661 BILATERAL CALF PAIN: ICD-10-CM

## 2020-02-11 DIAGNOSIS — I10 ESSENTIAL HYPERTENSION: ICD-10-CM

## 2020-02-11 DIAGNOSIS — R09.89 OTHER SPECIFIED SYMPTOMS AND SIGNS INVOLVING THE CIRCULATORY AND RESPIRATORY SYSTEMS: ICD-10-CM

## 2020-02-11 DIAGNOSIS — E11.9 CONTROLLED TYPE 2 DIABETES MELLITUS WITHOUT COMPLICATION, UNSPECIFIED WHETHER LONG TERM INSULIN USE (H): Primary | ICD-10-CM

## 2020-02-11 LAB
ANION GAP SERPL CALCULATED.3IONS-SCNC: 7 MMOL/L (ref 3–14)
BUN SERPL-MCNC: 27 MG/DL (ref 7–30)
CALCIUM SERPL-MCNC: 9.3 MG/DL (ref 8.5–10.1)
CHLORIDE SERPL-SCNC: 105 MMOL/L (ref 94–109)
CO2 SERPL-SCNC: 25 MMOL/L (ref 20–32)
CREAT SERPL-MCNC: 1.34 MG/DL (ref 0.66–1.25)
EST. AVERAGE GLUCOSE BLD GHB EST-MCNC: 143 MG/DL
GFR SERPL CREATININE-BSD FRML MDRD: 45 ML/MIN/{1.73_M2}
GLUCOSE SERPL-MCNC: 149 MG/DL (ref 70–99)
HBA1C MFR BLD: 6.6 % (ref 0–5.6)
POTASSIUM SERPL-SCNC: 4.2 MMOL/L (ref 3.4–5.3)
SODIUM SERPL-SCNC: 137 MMOL/L (ref 133–144)

## 2020-02-11 PROCEDURE — 83036 HEMOGLOBIN GLYCOSYLATED A1C: CPT | Mod: ZL | Performed by: FAMILY MEDICINE

## 2020-02-11 PROCEDURE — 36415 COLL VENOUS BLD VENIPUNCTURE: CPT | Mod: ZL | Performed by: FAMILY MEDICINE

## 2020-02-11 PROCEDURE — 40000788 ZZHCL STATISTIC ESTIMATED AVERAGE GLUCOSE: Mod: ZL | Performed by: FAMILY MEDICINE

## 2020-02-11 PROCEDURE — G0463 HOSPITAL OUTPT CLINIC VISIT: HCPCS

## 2020-02-11 PROCEDURE — 80048 BASIC METABOLIC PNL TOTAL CA: CPT | Mod: ZL | Performed by: FAMILY MEDICINE

## 2020-02-11 PROCEDURE — 99214 OFFICE O/P EST MOD 30 MIN: CPT | Performed by: FAMILY MEDICINE

## 2020-02-11 ASSESSMENT — MIFFLIN-ST. JEOR: SCORE: 1439.76

## 2020-02-11 ASSESSMENT — PAIN SCALES - GENERAL: PAINLEVEL: MILD PAIN (2)

## 2020-02-11 NOTE — NURSING NOTE
"Chief Complaint   Patient presents with     Diabetes       Initial /58   Pulse 66   Ht 1.733 m (5' 8.21\")   Wt 81.2 kg (179 lb)   SpO2 99%   BMI 27.05 kg/m   Estimated body mass index is 27.05 kg/m  as calculated from the following:    Height as of this encounter: 1.733 m (5' 8.21\").    Weight as of this encounter: 81.2 kg (179 lb).  Medication Reconciliation: complete  Shante Larson MA    "

## 2020-02-12 ENCOUNTER — HOSPITAL ENCOUNTER (OUTPATIENT)
Dept: ULTRASOUND IMAGING | Facility: HOSPITAL | Age: 85
Discharge: HOME | End: 2020-02-12
Attending: FAMILY MEDICINE | Admitting: FAMILY MEDICINE
Payer: MEDICARE

## 2020-02-12 DIAGNOSIS — R09.89 OTHER SPECIFIED SYMPTOMS AND SIGNS INVOLVING THE CIRCULATORY AND RESPIRATORY SYSTEMS: ICD-10-CM

## 2020-02-12 DIAGNOSIS — M79.661 BILATERAL CALF PAIN: ICD-10-CM

## 2020-02-12 DIAGNOSIS — M79.662 BILATERAL CALF PAIN: Primary | ICD-10-CM

## 2020-02-12 DIAGNOSIS — M79.662 BILATERAL CALF PAIN: ICD-10-CM

## 2020-02-12 DIAGNOSIS — M79.661 BILATERAL CALF PAIN: Primary | ICD-10-CM

## 2020-02-12 PROCEDURE — 93922 UPR/L XTREMITY ART 2 LEVELS: CPT | Mod: TC

## 2020-02-21 ENCOUNTER — APPOINTMENT (OUTPATIENT)
Dept: GENERAL RADIOLOGY | Facility: HOSPITAL | Age: 85
DRG: 282 | End: 2020-02-21
Attending: INTERNAL MEDICINE
Payer: MEDICARE

## 2020-02-21 ENCOUNTER — HOSPITAL ENCOUNTER (INPATIENT)
Facility: HOSPITAL | Age: 85
LOS: 1 days | Discharge: HOME | DRG: 282 | End: 2020-02-22
Attending: PHYSICIAN ASSISTANT | Admitting: INTERNAL MEDICINE
Payer: MEDICARE

## 2020-02-21 DIAGNOSIS — R79.89 ELEVATED TROPONIN: ICD-10-CM

## 2020-02-21 DIAGNOSIS — J10.1 INFLUENZA B: ICD-10-CM

## 2020-02-21 DIAGNOSIS — I48.91 ATRIAL FIBRILLATION WITH RVR (H): ICD-10-CM

## 2020-02-21 LAB
ALBUMIN SERPL-MCNC: 3.7 G/DL (ref 3.4–5)
ALP SERPL-CCNC: 70 U/L (ref 40–150)
ALT SERPL W P-5'-P-CCNC: 39 U/L (ref 0–70)
ANION GAP SERPL CALCULATED.3IONS-SCNC: 6 MMOL/L (ref 3–14)
AST SERPL W P-5'-P-CCNC: 46 U/L (ref 0–45)
BASOPHILS # BLD AUTO: 0 10E9/L (ref 0–0.2)
BASOPHILS NFR BLD AUTO: 0.9 %
BILIRUB SERPL-MCNC: 0.6 MG/DL (ref 0.2–1.3)
BUN SERPL-MCNC: 27 MG/DL (ref 7–30)
CALCIUM SERPL-MCNC: 8.9 MG/DL (ref 8.5–10.1)
CHLORIDE SERPL-SCNC: 106 MMOL/L (ref 94–109)
CO2 SERPL-SCNC: 25 MMOL/L (ref 20–32)
CREAT SERPL-MCNC: 1.23 MG/DL (ref 0.66–1.25)
DIFFERENTIAL METHOD BLD: ABNORMAL
EOSINOPHIL # BLD AUTO: 0.1 10E9/L (ref 0–0.7)
EOSINOPHIL NFR BLD AUTO: 1.1 %
ERYTHROCYTE [DISTWIDTH] IN BLOOD BY AUTOMATED COUNT: 13 % (ref 10–15)
FLUAV+FLUBV RNA SPEC QL NAA+PROBE: NEGATIVE
FLUAV+FLUBV RNA SPEC QL NAA+PROBE: POSITIVE
GFR SERPL CREATININE-BSD FRML MDRD: 50 ML/MIN/{1.73_M2}
GLUCOSE SERPL-MCNC: 153 MG/DL (ref 70–99)
HCT VFR BLD AUTO: 40.2 % (ref 40–53)
HGB BLD-MCNC: 13.1 G/DL (ref 13.3–17.7)
IMM GRANULOCYTES # BLD: 0 10E9/L (ref 0–0.4)
IMM GRANULOCYTES NFR BLD: 0.4 %
LACTATE BLD-SCNC: 2 MMOL/L (ref 0.7–2)
LYMPHOCYTES # BLD AUTO: 1.1 10E9/L (ref 0.8–5.3)
LYMPHOCYTES NFR BLD AUTO: 23.9 %
MCH RBC QN AUTO: 30.9 PG (ref 26.5–33)
MCHC RBC AUTO-ENTMCNC: 32.6 G/DL (ref 31.5–36.5)
MCV RBC AUTO: 95 FL (ref 78–100)
MONOCYTES # BLD AUTO: 0.8 10E9/L (ref 0–1.3)
MONOCYTES NFR BLD AUTO: 18.2 %
NEUTROPHILS # BLD AUTO: 2.5 10E9/L (ref 1.6–8.3)
NEUTROPHILS NFR BLD AUTO: 55.5 %
NRBC # BLD AUTO: 0 10*3/UL
NRBC BLD AUTO-RTO: 0 /100
NT-PROBNP SERPL-MCNC: 5373 PG/ML (ref 0–1800)
PLATELET # BLD AUTO: 160 10E9/L (ref 150–450)
POTASSIUM SERPL-SCNC: 4.2 MMOL/L (ref 3.4–5.3)
PROT SERPL-MCNC: 7.9 G/DL (ref 6.8–8.8)
RBC # BLD AUTO: 4.24 10E12/L (ref 4.4–5.9)
RSV RNA SPEC NAA+PROBE: NEGATIVE
SODIUM SERPL-SCNC: 137 MMOL/L (ref 133–144)
SPECIMEN SOURCE: ABNORMAL
TROPONIN I SERPL-MCNC: 1.09 UG/L (ref 0–0.04)
TROPONIN I SERPL-MCNC: 1.18 UG/L (ref 0–0.04)
WBC # BLD AUTO: 4.5 10E9/L (ref 4–11)

## 2020-02-21 PROCEDURE — 85025 COMPLETE CBC W/AUTO DIFF WBC: CPT | Performed by: PHYSICIAN ASSISTANT

## 2020-02-21 PROCEDURE — 99285 EMERGENCY DEPT VISIT HI MDM: CPT | Mod: 25

## 2020-02-21 PROCEDURE — 80053 COMPREHEN METABOLIC PANEL: CPT | Performed by: PHYSICIAN ASSISTANT

## 2020-02-21 PROCEDURE — 96374 THER/PROPH/DIAG INJ IV PUSH: CPT

## 2020-02-21 PROCEDURE — 40000786 ZZHCL STATISTIC ACTIVE MRSA SURVEILLANCE CULTURE: Performed by: INTERNAL MEDICINE

## 2020-02-21 PROCEDURE — 84484 ASSAY OF TROPONIN QUANT: CPT | Performed by: PHYSICIAN ASSISTANT

## 2020-02-21 PROCEDURE — 25000125 ZZHC RX 250: Performed by: PHYSICIAN ASSISTANT

## 2020-02-21 PROCEDURE — 83880 ASSAY OF NATRIURETIC PEPTIDE: CPT | Performed by: INTERNAL MEDICINE

## 2020-02-21 PROCEDURE — 71045 X-RAY EXAM CHEST 1 VIEW: CPT | Mod: TC

## 2020-02-21 PROCEDURE — 99223 1ST HOSP IP/OBS HIGH 75: CPT | Mod: AI | Performed by: INTERNAL MEDICINE

## 2020-02-21 PROCEDURE — 93005 ELECTROCARDIOGRAM TRACING: CPT

## 2020-02-21 PROCEDURE — 83605 ASSAY OF LACTIC ACID: CPT | Performed by: PHYSICIAN ASSISTANT

## 2020-02-21 PROCEDURE — 20000003 ZZH R&B ICU

## 2020-02-21 PROCEDURE — 99285 EMERGENCY DEPT VISIT HI MDM: CPT | Mod: Z6 | Performed by: PHYSICIAN ASSISTANT

## 2020-02-21 PROCEDURE — 25800030 ZZH RX IP 258 OP 636: Performed by: PHYSICIAN ASSISTANT

## 2020-02-21 PROCEDURE — 25000132 ZZH RX MED GY IP 250 OP 250 PS 637: Mod: GY | Performed by: INTERNAL MEDICINE

## 2020-02-21 PROCEDURE — 87631 RESP VIRUS 3-5 TARGETS: CPT | Performed by: PHYSICIAN ASSISTANT

## 2020-02-21 PROCEDURE — 36415 COLL VENOUS BLD VENIPUNCTURE: CPT | Performed by: PHYSICIAN ASSISTANT

## 2020-02-21 PROCEDURE — 93010 ELECTROCARDIOGRAM REPORT: CPT | Performed by: INTERNAL MEDICINE

## 2020-02-21 PROCEDURE — 96361 HYDRATE IV INFUSION ADD-ON: CPT

## 2020-02-21 RX ORDER — METOPROLOL SUCCINATE 25 MG/1
25 TABLET, EXTENDED RELEASE ORAL DAILY
Status: DISCONTINUED | OUTPATIENT
Start: 2020-02-22 | End: 2020-02-22

## 2020-02-21 RX ORDER — DILTIAZEM HYDROCHLORIDE 30 MG/1
30 TABLET, FILM COATED ORAL EVERY 6 HOURS SCHEDULED
Status: DISCONTINUED | OUTPATIENT
Start: 2020-02-21 | End: 2020-02-21

## 2020-02-21 RX ORDER — METOPROLOL TARTRATE 1 MG/ML
2.5 INJECTION, SOLUTION INTRAVENOUS ONCE
Status: COMPLETED | OUTPATIENT
Start: 2020-02-21 | End: 2020-02-21

## 2020-02-21 RX ORDER — METOPROLOL TARTRATE 25 MG/1
25 TABLET, FILM COATED ORAL ONCE
Status: COMPLETED | OUTPATIENT
Start: 2020-02-21 | End: 2020-02-21

## 2020-02-21 RX ORDER — FINASTERIDE 5 MG/1
5 TABLET, FILM COATED ORAL DAILY
Status: DISCONTINUED | OUTPATIENT
Start: 2020-02-22 | End: 2020-02-22 | Stop reason: HOSPADM

## 2020-02-21 RX ORDER — IBUPROFEN 200 MG
200 TABLET ORAL 4 TIMES DAILY PRN
Status: DISCONTINUED | OUTPATIENT
Start: 2020-02-21 | End: 2020-02-22 | Stop reason: HOSPADM

## 2020-02-21 RX ORDER — DOXYCYCLINE HYCLATE 100 MG
100 TABLET ORAL EVERY 12 HOURS SCHEDULED
Status: DISCONTINUED | OUTPATIENT
Start: 2020-02-21 | End: 2020-02-22 | Stop reason: HOSPADM

## 2020-02-21 RX ORDER — NALOXONE HYDROCHLORIDE 0.4 MG/ML
.1-.4 INJECTION, SOLUTION INTRAMUSCULAR; INTRAVENOUS; SUBCUTANEOUS
Status: DISCONTINUED | OUTPATIENT
Start: 2020-02-21 | End: 2020-02-22 | Stop reason: HOSPADM

## 2020-02-21 RX ORDER — SODIUM CHLORIDE 9 MG/ML
1000 INJECTION, SOLUTION INTRAVENOUS CONTINUOUS
Status: DISCONTINUED | OUTPATIENT
Start: 2020-02-21 | End: 2020-02-21

## 2020-02-21 RX ORDER — LIDOCAINE 40 MG/G
CREAM TOPICAL
Status: DISCONTINUED | OUTPATIENT
Start: 2020-02-21 | End: 2020-02-22 | Stop reason: HOSPADM

## 2020-02-21 RX ORDER — AMOXICILLIN 250 MG
2 CAPSULE ORAL 2 TIMES DAILY
Status: DISCONTINUED | OUTPATIENT
Start: 2020-02-21 | End: 2020-02-22 | Stop reason: HOSPADM

## 2020-02-21 RX ORDER — ASPIRIN 81 MG/1
81 TABLET, CHEWABLE ORAL DAILY
Status: DISCONTINUED | OUTPATIENT
Start: 2020-02-22 | End: 2020-02-22 | Stop reason: HOSPADM

## 2020-02-21 RX ORDER — LISINOPRIL 5 MG/1
5 TABLET ORAL DAILY
Status: DISCONTINUED | OUTPATIENT
Start: 2020-02-22 | End: 2020-02-22 | Stop reason: HOSPADM

## 2020-02-21 RX ORDER — ONDANSETRON 2 MG/ML
4 INJECTION INTRAMUSCULAR; INTRAVENOUS EVERY 6 HOURS PRN
Status: DISCONTINUED | OUTPATIENT
Start: 2020-02-21 | End: 2020-02-22 | Stop reason: HOSPADM

## 2020-02-21 RX ORDER — ONDANSETRON 4 MG/1
4 TABLET, ORALLY DISINTEGRATING ORAL EVERY 6 HOURS PRN
Status: DISCONTINUED | OUTPATIENT
Start: 2020-02-21 | End: 2020-02-22 | Stop reason: HOSPADM

## 2020-02-21 RX ORDER — AMOXICILLIN 250 MG
1 CAPSULE ORAL 2 TIMES DAILY
Status: DISCONTINUED | OUTPATIENT
Start: 2020-02-21 | End: 2020-02-22 | Stop reason: HOSPADM

## 2020-02-21 RX ADMIN — DOXYCYCLINE HYCLATE 100 MG: 100 TABLET, COATED ORAL at 20:20

## 2020-02-21 RX ADMIN — METOPROLOL TARTRATE 2.5 MG: 1 INJECTION, SOLUTION INTRAVENOUS at 14:38

## 2020-02-21 RX ADMIN — SODIUM CHLORIDE 1000 ML: 9 INJECTION, SOLUTION INTRAVENOUS at 13:45

## 2020-02-21 RX ADMIN — METOPROLOL TARTRATE 25 MG: 25 TABLET ORAL at 18:42

## 2020-02-21 RX ADMIN — SODIUM CHLORIDE 1000 ML: 9 INJECTION, SOLUTION INTRAVENOUS at 14:18

## 2020-02-21 ASSESSMENT — ENCOUNTER SYMPTOMS
COUGH: 1
FATIGUE: 1
CONFUSION: 0
WEAKNESS: 1
DIAPHORESIS: 1
EYES NEGATIVE: 1
SHORTNESS OF BREATH: 1
LIGHT-HEADEDNESS: 1
PALPITATIONS: 0
NAUSEA: 0
APPETITE CHANGE: 1

## 2020-02-21 ASSESSMENT — ACTIVITIES OF DAILY LIVING (ADL)
AMBULATION: 0-->INDEPENDENT
COGNITION: 0 - NO COGNITION ISSUES REPORTED
NUMBER_OF_TIMES_PATIENT_HAS_FALLEN_WITHIN_LAST_SIX_MONTHS: 2
BATHING: 0-->INDEPENDENT
DRESS: 0-->INDEPENDENT
RETIRED_EATING: 0-->INDEPENDENT
SWALLOWING: 0-->SWALLOWS FOODS/LIQUIDS WITHOUT DIFFICULTY
FALL_HISTORY_WITHIN_LAST_SIX_MONTHS: YES
TRANSFERRING: 0-->INDEPENDENT
RETIRED_COMMUNICATION: 0-->UNDERSTANDS/COMMUNICATES WITHOUT DIFFICULTY
ADLS_ACUITY_SCORE: 13
TOILETING: 0-->INDEPENDENT

## 2020-02-21 NOTE — ED TRIAGE NOTES
Patient presents via [facility] with generalized weakness, fatigue, and possible flu symptoms. Patient fell a couple days ago and hit his head. He did not lose consciousness and has had no side effects from the fall. Patient's wife has the flu.

## 2020-02-21 NOTE — ED NOTES
DATE:  2/21/2020   TIME OF RECEIPT FROM LAB:  2769  LAB TEST:  troponin  LAB VALUE:  1.18  RESULTS GIVEN WITH READ-BACK TO (PROVIDER):  Khurram SU  TIME LAB VALUE REPORTED TO PROVIDER:   1547  2

## 2020-02-21 NOTE — ED NOTES
Critical result of  troponin value of 1.095  Reported to  Khurram SU  Time given to provider  140Anna Parkinson RN 2:04 PM 2/21/2020

## 2020-02-21 NOTE — ED PROVIDER NOTES
"  History     Chief Complaint   Patient presents with     Flu Symptoms     Fatigue     Generalized Weakness     HPI  Jeronimo Payton is a 89-year-old male who presents emergency department via ambulance today with complaints of flulike symptoms for the last 3 to 4 days with increasing fatigue, generalized weakness, mild cough, lower extremity weakness, feeling \"woozy\".  Patient did have a fall 2 days ago striking the top of his head.  He denies loss of consciousness.  He denies headache at this time.  Patient denies chest pain.  He has had increasing shortness of breath.  He denies any prior cardiac history and has no history of atrial fibrillation.  He denies abdominal pain, nausea, vomiting.  He has been mildly chilled with sweats this morning.      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 and albumin creatinine ratio  mg/g (H) 04/25/2017     Priority: Medium     Vitamin D deficiency 12/03/2013     Priority: Medium     Benign non-nodular prostatic hyperplasia with lower urinary tract symptoms 10/22/2008     Priority: Medium     Hyperlipidemia 12/01/2006     Priority: Medium     Overview:   IMO Update 10/11       Hypertrophy of breast 10/31/2006     Priority: Medium     Elevated prostate specific antigen (PSA) 09/19/2006     Priority: Medium     Controlled diabetes mellitus type II without complication (H) 08/25/2004     Priority: Medium     Overview:        Essential hypertension 03/28/2003     Priority: Medium     Overview:   Mentioned in chart  - Patient denies  IMO Update          Past Medical History:    No past medical history on file.    Past Surgical History:    Past Surgical History:   Procedure Laterality Date     CARPAL TUNNEL RELEASE RT/LT  01/01/2010     COLONOSCOPY - HIM SCAN  12/06/2006    " Colonoscopy, flex, diagnostic     HERNIA REPAIR Left     groin      JOINT REPLACEMTN, KNEE RT/LT Bilateral     one was in 1992 the other was in 2000. unsure which was which.       Family History:    Family History   Problem Relation Age of Onset     Diabetes Mother      Cancer Mother        Social History:  Marital Status:   [2]  Social History     Tobacco Use     Smoking status: Never Smoker     Smokeless tobacco: Never Used   Substance Use Topics     Alcohol use: Yes     Comment: beer occasionally     Drug use: No        Medications:    amoxicillin (AMOXIL) 500 MG capsule  aspirin 81 MG tablet  Cholecalciferol (VITAMIN D) 2000 UNITS CAPS  cyanocobalamin (VITAMIN B-12) 1000 MCG tablet  finasteride (PROSCAR) 5 MG tablet  ibuprofen (ADVIL) 200 MG capsule  lisinopril (PRINIVIL/ZESTRIL) 20 MG tablet  metFORMIN (GLUCOPHAGE-XR) 500 MG 24 hr tablet          Review of Systems   Constitutional: Positive for appetite change, diaphoresis and fatigue.   HENT: Positive for congestion.    Eyes: Negative.    Respiratory: Positive for cough and shortness of breath.    Cardiovascular: Negative for chest pain, palpitations and leg swelling.   Gastrointestinal: Negative for nausea.   Genitourinary: Negative.    Musculoskeletal: Positive for gait problem.   Skin: Negative.    Neurological: Positive for weakness and light-headedness.   Psychiatric/Behavioral: Negative for confusion.       Physical Exam   BP: (!) 89/69  Pulse: (!) 129  Heart Rate: (!) 128  Temp: 97.7  F (36.5  C)  Resp: 20  SpO2: 99 %      Physical Exam  Constitutional:       General: He is not in acute distress.     Appearance: Normal appearance. He is not diaphoretic.   HENT:      Head: Atraumatic.      Nose: Nose normal.      Mouth/Throat:      Mouth: Mucous membranes are moist.   Eyes:      General: No scleral icterus.     Extraocular Movements: Extraocular movements intact.      Conjunctiva/sclera: Conjunctivae normal.      Pupils: Pupils are equal, round,  and reactive to light.   Neck:      Musculoskeletal: Normal range of motion.   Cardiovascular:      Rate and Rhythm: Tachycardia present. Rhythm irregular.      Heart sounds: Normal heart sounds.   Pulmonary:      Effort: Pulmonary effort is normal. No respiratory distress.      Breath sounds: Normal breath sounds.   Abdominal:      General: Bowel sounds are normal.      Palpations: Abdomen is soft.      Tenderness: There is no abdominal tenderness.   Musculoskeletal: Normal range of motion.         General: No tenderness.   Lymphadenopathy:      Cervical: No cervical adenopathy.   Skin:     General: Skin is warm.      Findings: No rash.   Neurological:      General: No focal deficit present.      Mental Status: He is alert and oriented to person, place, and time.         ED Course        Procedures  Patient presents with new onset atrial fibrillation with RVR and also tests positive for influenza B.  Troponin significantly elevated at 1.09 and there is uncertainty as to when symptoms began.  Patient given 1L NS with no improvement in BP or heart rate.  I spoke to hospitalist, Dr. Mason, his recommendations regarding the patient.  He did not feel patient was a good candidate for cardioversion.  He recommended a very small dose of betablocker to see if symptoms would improve.  2.5 mg IV metoprolol given.  BP dropped into high 80s/50s and HR did not improve.  Second troponin two hours later slightly elevated from previous at 1.18.      Dr. Mason was contacted and accepted patient for admission to ICU.                 EKG Interpretation:      Interpreted by GEORGES Restrepo  Time reviewed: 1310  Symptoms at time of EKG: weakness   Rhythm: atrial fibrillation with RVR  Rate: 122 bpm  Axis: normal  Ectopy: none  Conduction: normal  ST Segments/ T Waves: No ST-T wave changes  Q Waves: none  Comparison to prior: new a-fib    Clinical Impression: atrial fibrillation with RVR      Critical Care time:  was 20  minutes for this patient excluding procedures.         Results for orders placed or performed during the hospital encounter of 02/21/20 (from the past 24 hour(s))   CBC with platelets differential   Result Value Ref Range    WBC 4.5 4.0 - 11.0 10e9/L    RBC Count 4.24 (L) 4.4 - 5.9 10e12/L    Hemoglobin 13.1 (L) 13.3 - 17.7 g/dL    Hematocrit 40.2 40.0 - 53.0 %    MCV 95 78 - 100 fl    MCH 30.9 26.5 - 33.0 pg    MCHC 32.6 31.5 - 36.5 g/dL    RDW 13.0 10.0 - 15.0 %    Platelet Count 160 150 - 450 10e9/L    Diff Method Automated Method     % Neutrophils 55.5 %    % Lymphocytes 23.9 %    % Monocytes 18.2 %    % Eosinophils 1.1 %    % Basophils 0.9 %    % Immature Granulocytes 0.4 %    Nucleated RBCs 0 0 /100    Absolute Neutrophil 2.5 1.6 - 8.3 10e9/L    Absolute Lymphocytes 1.1 0.8 - 5.3 10e9/L    Absolute Monocytes 0.8 0.0 - 1.3 10e9/L    Absolute Eosinophils 0.1 0.0 - 0.7 10e9/L    Absolute Basophils 0.0 0.0 - 0.2 10e9/L    Abs Immature Granulocytes 0.0 0 - 0.4 10e9/L    Absolute Nucleated RBC 0.0    Influenza A and B and RSV PCR   Result Value Ref Range    Specimen Description Nasopharyngeal     Influenza A PCR Negative NEG^Negative    Influenza B PCR Positive (A) NEG^Negative    Resp Syncytial Virus Negative NEG^Negative   Comprehensive metabolic panel   Result Value Ref Range    Sodium 137 133 - 144 mmol/L    Potassium 4.2 3.4 - 5.3 mmol/L    Chloride 106 94 - 109 mmol/L    Carbon Dioxide 25 20 - 32 mmol/L    Anion Gap 6 3 - 14 mmol/L    Glucose 153 (H) 70 - 99 mg/dL    Urea Nitrogen 27 7 - 30 mg/dL    Creatinine 1.23 0.66 - 1.25 mg/dL    GFR Estimate 50 (L) >60 mL/min/[1.73_m2]    GFR Estimate If Black 58 (L) >60 mL/min/[1.73_m2]    Calcium 8.9 8.5 - 10.1 mg/dL    Bilirubin Total 0.6 0.2 - 1.3 mg/dL    Albumin 3.7 3.4 - 5.0 g/dL    Protein Total 7.9 6.8 - 8.8 g/dL    Alkaline Phosphatase 70 40 - 150 U/L    ALT 39 0 - 70 U/L    AST 46 (H) 0 - 45 U/L   Lactate for Sepsis Protocol   Result Value Ref Range     Lactate for Sepsis Protocol 2.0 0.7 - 2.0 mmol/L   Troponin I   Result Value Ref Range    Troponin I ES 1.095 (HH) 0.000 - 0.045 ug/L   Troponin I   Result Value Ref Range    Troponin I ES 1.180 (HH) 0.000 - 0.045 ug/L       Medications   0.9% sodium chloride BOLUS (500 mLs Intravenous Not Given 2/21/20 1417)     Followed by   sodium chloride 0.9% infusion (1,000 mLs Intravenous New Bag 2/21/20 1418)   sodium chloride (PF) 0.9% PF flush 3 mL (has no administration in time range)   sodium chloride (PF) 0.9% PF flush 3 mL (3 mLs Intravenous Not Given 2/21/20 1346)   0.9% sodium chloride BOLUS (0 mLs Intravenous Stopped 2/21/20 1409)   metoprolol (LOPRESSOR) injection 2.5 mg (2.5 mg Intravenous Given 2/21/20 1438)       Assessments & Plan (with Medical Decision Making)     I have reviewed the nursing notes.    I have reviewed the findings, diagnosis, plan and need for follow up with the patient.     New Prescriptions    No medications on file       Final diagnoses:   Influenza B   Atrial fibrillation with RVR (H)     GEORGES Restrepo on 2/21/2020 at 4:14 PM   2/21/2020   [facility]     Isrrael Britt PA  02/21/20 [id number]

## 2020-02-22 ENCOUNTER — APPOINTMENT (OUTPATIENT)
Dept: PHYSICAL THERAPY | Facility: HOSPITAL | Age: 85
DRG: 282 | End: 2020-02-22
Payer: MEDICARE

## 2020-02-22 VITALS
HEART RATE: 63 BPM | DIASTOLIC BLOOD PRESSURE: 75 MMHG | BODY MASS INDEX: 27.42 KG/M2 | SYSTOLIC BLOOD PRESSURE: 125 MMHG | TEMPERATURE: 98.3 F | RESPIRATION RATE: 18 BRPM | OXYGEN SATURATION: 100 % | WEIGHT: 181.44 LBS

## 2020-02-22 PROBLEM — N18.31 CKD STAGE G3A/A2, GFR 45-59 AND ALBUMIN CREATININE RATIO 30-299 MG/G (H): Status: ACTIVE | Noted: 2017-04-25

## 2020-02-22 LAB
ANION GAP SERPL CALCULATED.3IONS-SCNC: 5 MMOL/L (ref 3–14)
BUN SERPL-MCNC: 28 MG/DL (ref 7–30)
CALCIUM SERPL-MCNC: 8.1 MG/DL (ref 8.5–10.1)
CHLORIDE SERPL-SCNC: 111 MMOL/L (ref 94–109)
CO2 SERPL-SCNC: 24 MMOL/L (ref 20–32)
CREAT SERPL-MCNC: 1.07 MG/DL (ref 0.66–1.25)
ERYTHROCYTE [DISTWIDTH] IN BLOOD BY AUTOMATED COUNT: 13.2 % (ref 10–15)
GFR SERPL CREATININE-BSD FRML MDRD: 60 ML/MIN/{1.73_M2}
GLUCOSE BLDC GLUCOMTR-MCNC: 125 MG/DL (ref 70–99)
GLUCOSE SERPL-MCNC: 134 MG/DL (ref 70–99)
HCT VFR BLD AUTO: 33.3 % (ref 40–53)
HGB BLD-MCNC: 10.9 G/DL (ref 13.3–17.7)
LACTATE BLD-SCNC: 0.9 MMOL/L (ref 0.7–2)
MCH RBC QN AUTO: 31 PG (ref 26.5–33)
MCHC RBC AUTO-ENTMCNC: 32.7 G/DL (ref 31.5–36.5)
MCV RBC AUTO: 95 FL (ref 78–100)
PLATELET # BLD AUTO: 121 10E9/L (ref 150–450)
POTASSIUM SERPL-SCNC: 4.2 MMOL/L (ref 3.4–5.3)
PROCALCITONIN SERPL-MCNC: <0.05 NG/ML
RBC # BLD AUTO: 3.52 10E12/L (ref 4.4–5.9)
SODIUM SERPL-SCNC: 140 MMOL/L (ref 133–144)
TROPONIN I SERPL-MCNC: 1.18 UG/L (ref 0–0.04)
TROPONIN I SERPL-MCNC: 1.45 UG/L (ref 0–0.04)
WBC # BLD AUTO: 3.6 10E9/L (ref 4–11)

## 2020-02-22 PROCEDURE — 99239 HOSP IP/OBS DSCHRG MGMT >30: CPT | Performed by: NURSE PRACTITIONER

## 2020-02-22 PROCEDURE — 25000132 ZZH RX MED GY IP 250 OP 250 PS 637: Mod: GY | Performed by: NURSE PRACTITIONER

## 2020-02-22 PROCEDURE — 84484 ASSAY OF TROPONIN QUANT: CPT | Performed by: NURSE PRACTITIONER

## 2020-02-22 PROCEDURE — 97530 THERAPEUTIC ACTIVITIES: CPT | Mod: GP

## 2020-02-22 PROCEDURE — 84484 ASSAY OF TROPONIN QUANT: CPT | Performed by: INTERNAL MEDICINE

## 2020-02-22 PROCEDURE — 84145 PROCALCITONIN (PCT): CPT | Performed by: NURSE PRACTITIONER

## 2020-02-22 PROCEDURE — 97162 PT EVAL MOD COMPLEX 30 MIN: CPT | Mod: GP

## 2020-02-22 PROCEDURE — 36415 COLL VENOUS BLD VENIPUNCTURE: CPT | Performed by: NURSE PRACTITIONER

## 2020-02-22 PROCEDURE — 83605 ASSAY OF LACTIC ACID: CPT | Performed by: INTERNAL MEDICINE

## 2020-02-22 PROCEDURE — 36415 COLL VENOUS BLD VENIPUNCTURE: CPT | Performed by: INTERNAL MEDICINE

## 2020-02-22 PROCEDURE — 00000146 ZZHCL STATISTIC GLUCOSE BY METER IP

## 2020-02-22 PROCEDURE — 80048 BASIC METABOLIC PNL TOTAL CA: CPT | Performed by: INTERNAL MEDICINE

## 2020-02-22 PROCEDURE — 85027 COMPLETE CBC AUTOMATED: CPT | Performed by: INTERNAL MEDICINE

## 2020-02-22 RX ORDER — METOPROLOL SUCCINATE 25 MG/1
12.5 TABLET, EXTENDED RELEASE ORAL DAILY
Qty: 30 TABLET | Refills: 0 | Status: SHIPPED | OUTPATIENT
Start: 2020-02-23 | End: 2020-02-27

## 2020-02-22 RX ORDER — OSELTAMIVIR PHOSPHATE 30 MG/1
30 CAPSULE ORAL 2 TIMES DAILY
Status: DISCONTINUED | OUTPATIENT
Start: 2020-02-22 | End: 2020-02-22 | Stop reason: HOSPADM

## 2020-02-22 RX ORDER — OSELTAMIVIR PHOSPHATE 30 MG/1
30 CAPSULE ORAL 2 TIMES DAILY
Qty: 9 CAPSULE | Refills: 0 | Status: SHIPPED | OUTPATIENT
Start: 2020-02-22 | End: 2020-02-27

## 2020-02-22 RX ADMIN — METOPROLOL SUCCINATE 12.5 MG: 25 TABLET, EXTENDED RELEASE ORAL at 09:06

## 2020-02-22 RX ADMIN — FINASTERIDE 5 MG: 5 TABLET, FILM COATED ORAL at 08:35

## 2020-02-22 RX ADMIN — ASPIRIN 81 MG 81 MG: 81 TABLET ORAL at 08:35

## 2020-02-22 RX ADMIN — OSELTAMIVIR PHOSPHATE 30 MG: 30 CAPSULE ORAL at 08:35

## 2020-02-22 RX ADMIN — LISINOPRIL 5 MG: 5 TABLET ORAL at 08:36

## 2020-02-22 RX ADMIN — DOXYCYCLINE HYCLATE 100 MG: 100 TABLET, COATED ORAL at 08:35

## 2020-02-22 RX ADMIN — SENNOSIDES AND DOCUSATE SODIUM 1 TABLET: 8.6; 5 TABLET ORAL at 08:35

## 2020-02-22 ASSESSMENT — ACTIVITIES OF DAILY LIVING (ADL)
ADLS_ACUITY_SCORE: 16
ADLS_ACUITY_SCORE: 12

## 2020-02-22 NOTE — PROGRESS NOTES
SHAGGY GRANGER.  Patient visit during  rounds. Patient had no spiritual care requests at this time.

## 2020-02-22 NOTE — PHARMACY
[facility]    Pharmacy      Antimicrobial Stewardship Note     Current antimicrobial therapy:  Anti-infectives (From now, onward)    Start     Dose/Rate Route Frequency Ordered Stop    02/22/20 0900  oseltamivir (TAMIFLU) capsule 30 mg      30 mg Oral 2 TIMES DAILY 02/22/20 0644 02/27/20 0859    02/21/20 2000  doxycycline hyclate (VIBRA-TABS) tablet 100 mg      100 mg Oral EVERY 12 HOURS SCHEDULED 02/21/20 9141            Indication: Influenza (Tamiflu), Prevention of secondary bacterial infection (Doxycycline)    Days of Therapy: 1(Tamiflu), 2(Doxy)     Pertinent labs:  Creatinine   Creatinine   Date Value Ref Range Status   02/22/2020 1.07 0.66 - 1.25 mg/dL Final   02/21/2020 1.23 0.66 - 1.25 mg/dL Final   02/11/2020 1.34 (H) 0.66 - 1.25 mg/dL Final     WBC   WBC   Date Value Ref Range Status   02/22/2020 3.6 (L) 4.0 - 11.0 10e9/L Final   02/21/2020 4.5 4.0 - 11.0 10e9/L Final   11/15/2018 9.2 4.0 - 11.0 10e9/L Final     Procalcitonin No results found for: PCAL  CRP   CRP Inflammation   Date Value Ref Range Status   07/08/2018 32.6 (H) 0.0 - 8.0 mg/L Final   03/23/2017 <2.9 0.0 - 8.0 mg/L Final       Culture Results:   (2/21/20) Influenza = B positive  (2/21/20) MRSA surveillance     Recommendations/Interventions:  1. Unless confirmed secondary pneumonia or severe disease (respiratory failure, hypotension, and fever), discontinue doxycyline.   2. Empiric treatment for secondary bacterial pneumonia is a 3rd generation cephalosporin or a FQ (patient has allergy to FQ).     Luis Armando Skelton, [facility]  February 22, 2020

## 2020-02-22 NOTE — DISCHARGE SUMMARY
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  Patient did have a fall 2 days ago striking the top of his head.  He denies loss of consciousness.  He denies headache at this time.  Patient denies chest pain.  He has had increasing shortness of breath.  He denies any prior cardiac history and has no history of atrial fibrillation.  He denies abdominal pain, nausea, vomiting.  He has been mildly chilled with sweats this morning.      Hospital Course       New onset A-fib (H): Most likely set off by viral illness. He converted spontaneously after single 2.5mg lopressor dose. Started on 12.5mg oral metoprolol and his heart rate has been 60-90's normal sinus rhythm since shortly after his arrival to the floor yesterday. Will continue him on this at discharge.  His is very anxious for discharge, feels his presenting symptom of dyspnea has now completely resolved. Will discharge home today.  His CHADSVASC2 score is 43, his Hasbled score is 3. Discussed possibly needed full anticoagulation, but for now will have him continue ASA and discuss this with his PCP.       Influenza B: Wife positive for influenza B, nasal swab was done " in the ED and was positive. His symptoms are minimal, feels he is over the worst of it. He has not had any fever, normal WBC, normal chest xray, minimal cough. On exam lungs are clear, he does not have any hypoxia. However, due to his age, positive result and underlying new medical diagnoses did start him on tamiflu for 5 day course-renally dosed. He is instructed to notify or return of he develops sputum, fever, return of dyspnea.     Type 2 MI: Elevated troponin on arrival with peak 1.452, this afternoon it is trending down. Due to cardiac stress from A-fib. He has not had any chest pain.       Essential hypertension: Blood pressures stable with addition of metoprolol.        Controlled diabetes mellitus type II without complication (H): Continue home dose metformin, renal function is at his normal baseline.       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 Tamiflu renally dosed.         Jeanette Goldberg CNP        Pending Results     Unresulted Labs Ordered in the Past 30 Days of this Admission     Date and Time Order Name Status Description    2/21/2020 1859 Active MRSA Surveillance Culture Preliminary     2/11/2020 0855 ESTIMATED AVERAGE GLUCOSE In process           Code Status   DNR / DNI       Primary Care Physician   Eloy Conde    Discharge Disposition   Discharged to home  Condition at discharge: Stable    Consultations This Hospital Stay   PHYSICAL THERAPY ADULT IP CONSULT  OCCUPATIONAL THERAPY ADULT IP CONSULT  SPIRITUAL HEALTH SERVICES IP CONSULT    Time Spent on this Encounter   IJeanette NP, personally saw the patient today and spent greater than 30 minutes discharging this patient.    Discharge Orders      Reason for your hospital stay    New onset A-fib     Follow-up and recommended labs and tests     Follow up with primary care provider, Eloy Conde, within 5 days for hospital follow- up.  No follow up  labs or test are needed.     Activity    Your activity upon discharge: activity as tolerated     When to contact your care team    Call your primary doctor or present to the emergency/urgent care if you have any of the following: fever,chills, cough with sputum, shortness of breath.     DNR/DNI     Diet    Follow this diet upon discharge: Orders Placed This Encounter      Combination Diet 2 gm NA Diet     Discharge Medications   Current Discharge Medication List      START taking these medications    Details   metoprolol succinate ER (TOPROL-XL) 25 MG 24 hr tablet Take 0.5 tablets (12.5 mg) by mouth daily  Qty: 30 tablet, Refills: 0    Associated Diagnoses: Atrial fibrillation with RVR (H)      oseltamivir (TAMIFLU) 30 MG capsule Take 1 capsule (30 mg) by mouth 2 times daily  Qty: 9 capsule, Refills: 0    Associated Diagnoses: Atrial fibrillation with RVR (H)         CONTINUE these medications which have NOT CHANGED    Details   aspirin 81 MG tablet       Cholecalciferol (VITAMIN D) 2000 UNITS CAPS Take 1 capsule by mouth daily       cyanocobalamin (VITAMIN B-12) 1000 MCG tablet Take 1,000 mcg by mouth daily      finasteride (PROSCAR) 5 MG tablet TAKE 1 TABLET BY MOUTH DAILY      lisinopril (PRINIVIL/ZESTRIL) 20 MG tablet Take 20 mg by mouth daily  Refills: 1      metFORMIN (GLUCOPHAGE-XR) 500 MG 24 hr tablet Take 1,000 mg in AM and 500 mg in PM. Swallow tablet whole; do not crush, divide or chew.      amoxicillin (AMOXIL) 500 MG capsule Takes one dose prior to dental care.  Refills: 1      ibuprofen (ADVIL) 200 MG capsule            Allergies   Allergies   Allergen Reactions     Alatrofloxacin      No Clinical Screening - See Comments      Some kind of anesthetic     Data   Most Recent 3 CBC's:  Recent Labs   Lab Test 02/22/20 0419 02/21/20  1324 11/15/18  0459   WBC 3.6* 4.5 9.2   HGB 10.9* 13.1* 12.0*   MCV 95 95 91   * 160 164      Most Recent 3 BMP's:  Recent Labs   Lab Test 02/22/20 0419  02/21/20  1324 02/11/20  0855    137 137   POTASSIUM 4.2 4.2 4.2   CHLORIDE 111* 106 105   CO2 24 25 25   BUN 28 27 27   CR 1.07 1.23 1.34*   ANIONGAP 5 6 7   RACH 8.1* 8.9 9.3   * 153* 149*     Most Recent 2 LFT's:  Recent Labs   Lab Test 02/21/20  1324 04/23/19 11/15/18  0459   AST 46* 25 25   ALT 39 16 21   ALKPHOS 70  --  75   BILITOTAL 0.6  --  0.4     Most Recent INR's and Anticoagulation Dosing History:  Anticoagulation Dose History     Recent Dosing and Labs Latest Ref Rng & Units 3/23/2017 3/28/2018 8/13/2018 10/7/2018 11/15/2018    INR 0.80 - 1.20 1.14 1.1 1.0 1.19 1.18        Most Recent 3 Troponin's:  Recent Labs   Lab Test 02/22/20  1236 02/22/20  0419 02/21/20  1523   TROPI 1.175* 1.452* 1.180*     Most Recent Cholesterol Panel:  Recent Labs   Lab Test 04/23/19   CHOL 189      HDL 55   TRIG 105     Most Recent 6 Bacteria Isolates From Any Culture (See EPIC Reports for Culture Details):  Recent Labs   Lab Test 02/21/20  1855 07/08/18  1336 07/08/18  1330   CULT Culture in progress 2 of 2 bottles  Streptococcus salivarius group  Identification obtained by MALDI-TOF mass spectrometry research use only database. Test   characteristics determined and verified by the [facility]   ([facility]) [place], [place].  *  Critical Value called to and read back by  Dasia Georges at 0402 on 7/9/18 by Bozena Christensen. Gram stain bottles 1 and 2 called.    Gram stain review consistent with reported results. No growth after 6 days     Most Recent TSH, T4 and A1c Labs:  Recent Labs   Lab Test 02/11/20  0855 08/05/19  1420   TSH  --  2.44   A1C 6.6* 6.6*     Results for orders placed or performed during the hospital encounter of 02/21/20   XR Chest Port 1 View    Narrative    PROCEDURE:  XR CHEST PORT 1 VW    HISTORY: flu b positiive. .    COMPARISON:  11/15/2018    FINDINGS:    The cardiomediastinal contours are stable.  Mild fibrotic changes at the bases are stable. No new  focal  consolidation, effusion or pneumothorax is seen. Costochondral  calcifications are again present.      Impression    IMPRESSION:  Stable portable chest.      MANASA PARKER MD

## 2020-02-22 NOTE — PROGRESS NOTES
Inpatient Physical Therapy Evaluation    Name: Jeronimo Payton MRN# [id number]   Age: 89 year old YOB: 1927     Date of Consultation: 2020  Consultation is requested by:  Jeanette Goldberg NP  Specific Consultation Request:  eval and treat  Primary care provider: Eloy Conde    General Information:   Onset Date: 20    History of Current Problem/Admission: Influenza B [J10.1]  Elevated troponin [R79.89]  Atrial fibrillation with RVR (H) [I48.91]    Prior Level of Function: Patient lives in a home with his wife and help from son when needed. Does have one small step outside of home, but no stairs that will need to be done in home. Uses a SPC for ambulation and has been going to OP PT in [place] for LE strengthening and balance work.  Ambulation: 1 - Assistive Equipment   Transferrin - Assistive Equipment   Toiletin - Independent    Bathin - Independent   Dressin - Independent   Eatin - Independent   Communication: 0 - Understands/communicates without difficulty      Fall history within the last 6 months: Yes    Current Living Situation: Patient has 1 small stair to enter the home.     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 and albumin creatinine ratio  mg/g (H) 2017     Controlled diabetes mellitus type II without complication (H) 2004    Overview:      Elevated prostate specific antigen (PSA) 2006     Essential hypertension 3/28/2003    Overview:  Mentioned in chart  - Patient denies IMO Update     Hyperlipidemia 2006    Overview:  IMO Update 10/11     Hypertrophy of breast 10/31/2006     Vitamin D deficiency 12/3/2013       Past Surgical History:  Past Surgical History:   Procedure  Laterality Date     CARPAL TUNNEL RELEASE RT/LT  01/01/2010     COLONOSCOPY - HIM SCAN  12/06/2006    Colonoscopy, flex, diagnostic     HERNIA REPAIR Left     groin      JOINT REPLACEMTN, KNEE RT/LT Bilateral     one was in 1992 the other was in 2000. unsure which was which.       Medications:   Current Facility-Administered Medications   Medication     aspirin (ASA) chewable tablet 81 mg     doxycycline hyclate (VIBRA-TABS) tablet 100 mg     finasteride (PROSCAR) tablet 5 mg     ibuprofen (ADVIL/MOTRIN) tablet 200 mg     lidocaine (LMX4) kit     lidocaine 1 % 0.1-1 mL     lisinopril (ZESTRIL) tablet 5 mg     magnesium hydroxide (MILK OF MAGNESIA) suspension 30 mL     melatonin tablet 1 mg     metoprolol succinate (TOPROL-XL) half-tab 12.5 mg     naloxone (NARCAN) injection 0.1-0.4 mg     ondansetron (ZOFRAN-ODT) ODT tab 4 mg    Or     ondansetron (ZOFRAN) injection 4 mg     oseltamivir (TAMIFLU) capsule 30 mg     senna-docusate (SENOKOT-S/PERICOLACE) 8.6-50 MG per tablet 1 tablet    Or     senna-docusate (SENOKOT-S/PERICOLACE) 8.6-50 MG per tablet 2 tablet     sodium chloride (PF) 0.9% PF flush 3 mL     sodium chloride (PF) 0.9% PF flush 3 mL       Weight Bearing Status: WBAT     Cognitive Status Examination:  Orientation: awake and alert  Level of Consciousness: alert  Follows Commands and Answers Questions: 100% of the time  Personal Safety and Judgement: Intact  Memory: Immediate recall intact  Comments:     Pain:   Currently in pain? No      Physical Therapy Evaluation:   Integumentary/Edema:   ROM: wNLs  Strength: 5/5 strength throughout LEs  Bed Mobility: NT  Transfers: SBA x 1  Gait: SBA x 1 for 50 feet x 2 trials  Stairs: NT  Balance: NT  Sensory: NT  Coordination: NT    Physical Therapy Interventions: Balance, Gait Training  and Strengthening    Clinical Impressions:  Criteria for Skilled Therapeutic Intervention Met: Yes, treatment indicated  PT Diagnosis: LE weakness and deconditioning  Influenced by the  following impairments: weakness  Functional limitations due to impairment: difficulty with prolonged ambulation and daily tasks secondary to sickness  Clinical presentation: Stable/Uncomplicated  Clinical presentation rationale: clinical judgement  Clinical Decision making (complexity): Moderate Complexity  Frequency: 6 times/week  Predicted Duration of Therapy Intervention (days/wks): 5 days  Anticipated Discharge Disposition: Home with Outpatient Therapy   Anticipated Equipment Needs at Discharge: none  Risks and Benefits of therapy have been explained: Yes  Patient, Family & other staff in agreement with plan of care: Yes  Clinical Impression Comments: Patient in chair at start of therapy. Patient agreeable to Tx. Patient ambulated with SPC roughly 50 feet x 2 trials with no LOB and SBA x 1. Small steps taken during gait. Patient showed good safety recall and no signs of fall risk at this time. Patient SBA for all transfers. Patient's son present during initial evaluation. Patient would benefit from therapy sessions as long as patient is in hospital to maximize recovery and allow safest return home possible    Total Eval Time: 25 minutes

## 2020-02-22 NOTE — PROVIDER NOTIFICATION
Writer speaking with pt and visualized heart rate go from 120's to 55. No symptoms observed. Pt calm, HR verified by auscultation. Dr. Thibodeaux at bedside

## 2020-02-22 NOTE — PLAN OF CARE
Discharge Planner PT   Patient plan for discharge: Return home with OP PT  Current status: Patient in chair at start of therapy. Patient agreeable to Tx. Patient ambulated with SPC roughly 50 feet x 2 trials with no LOB and SBA x 1. Small steps taken during gait. Patient showed good safety recall and no signs of fall risk at this time. Patient SBA for all transfers.  Barriers to return to prior living situation: none  Recommendations for discharge: return home with OP PT follow up in [place]  Rationale for recommendations: clinical judgement       Entered by: Ludin Del Rio 02/22/2020 12:09 PM

## 2020-02-22 NOTE — PLAN OF CARE
Pt arrived to unit around 1750. Pt educated on ICU protocol. Reinforced calling for assistance. Educated on droplet precautions. Pts wife and son at bedside when pt arrived. NS running at 125ml/hr. LS clear. Skin intact. Denies pain and nausea. Pt was in Afib 's pt then converted to NSR 60's. MRSA swab completed. 1 time dose of metoprolol administered per Dr. Thibodeaux's order.     Hourly rounding.    Face to face report given with opportunity to observe patient.    Report given to Monica Ortega RN   2/21/2020  6:59 PM

## 2020-02-22 NOTE — H&P
[facility]    History and Physical - Hospitalist Service       Date of Admission:  2/21/2020    Assessment & Plan   Jeronimo Payton is a 89 year old male admitted on 2/21/2020.      Influenza b positive: symptoms of more short of breath and fatigue (might be more linked to new AFIb) and have been for two days and he has no other respiratory or GI symptoms, so will not use Tamiflu. Given his age will empirically start PO Doxycycline in attempt to prevent secondary bacterial infection.    AFIB: converted with time, Metoprolol and IV fluids received in ER. This may have been a stress response to the illness; recent physical exams document a regular rhythm. Will start PO Metoprolol in an attempt to encourage NS.      Elevated Troponin (NSTEMI type 2): the patient is asymptomatic and this may represent cardiac strain due to the illness and AFIB. Will trend Troponin and monitor on Telemetry. Follow up EKG after conversion to NS shows no ST abnormalities    Controlled type 2 diabetes (HgA1c 6.6) with stage 2 renal failure without insulin use: will hold Metformin and monitor glucose       Diet: Combination Diet Low Saturated Fat Na <2400mg Diet    DVT Prophylaxis: Pneumatic Compression Devices  Pierre Catheter: not present  Code Status: DNR/DNI      Disposition Plan   Expected discharge: 1-2 days, recommended to prior living arrangement once discharge plan established.  Entered: Tj Thibodeaux DO 02/21/2020, 6:48 PM     The patient's care was discussed with the Patient and his wife and son    Tj BAntoine Thibodeaux,   [facility]    ______________________________________________________________________    Chief Complaint   Short of breath and fatigue    History is obtained from the patient, his wife and son    History of Present Illness   Jeronimo Payton is a 89 year old male who is more active than you'd think based on age. Over the last two days, he's put off chores like shoveling snow because of this  unusual fatigue and associated shortness of breath. He came to the ER since things weren't improving after resting more for the last two days    ER Course: blood pressure was low (systolic in the 90's). A typical BP based on office visit notes are systolic 140's. He was in no distress. EKG showed AFIB with rates in the 1-teens. Lab diagnostics showed stable stage 3 renal failure, Hgb 13.1 and WBC 4.5. Troponin trend was 1.095 and 1.180. chest was stable. He was treated with a low dose of IV Metoprolol and an IV saline bolus. NS returned shortly after transfer to the Med/Surg floor    Review of Systems    Constitutional: No fever or chills, some generalized weakness, no unintentional weight change, no particular change in appetite, fatigued  Ears, Nose & Throat: no sore throat, no nasal drainage, no congestion. No ear pain, no ear drainage, no particular change in hearing  Eyes: no particular change in vision, no redness, no drainage  Cardiovascular: No chest pain at rest, no chest pain with familiar activities.  Pulmonary: No cough, increase in work of breathing especially with activity and associated shortness of breath    Gastrointestinal: No abdominal pain, no nausea, no vomiting, no diarrhea. No particular change in bowel movement pattern, no black stools, no bloody stools  Genitourinary: No particular change in incontinence, no pain with urination, no particular change in stream, no particular change in amount urinated with urge, no discharge  Skin: No particular change in bruising, no rashes  Musculoskeletal: no particular change in strength, no particular change in muscle development  Neurological: no numbness and tingling, no headache, no particular change in balance, no dizziness  Psychologic: No particular change in depression and/or anxiety  Endocrine: No particular change in heat or cold intolerance         Past Medical History    I have reviewed this patient's medical history and updated it with  "pertinent information if needed.   No past medical history on file.    Past Surgical History   I have reviewed this patient's surgical history and updated it with pertinent information if needed.  Past Surgical History:   Procedure Laterality Date     CARPAL TUNNEL RELEASE RT/LT  01/01/2010     COLONOSCOPY - HIM SCAN  12/06/2006    Colonoscopy, flex, diagnostic     HERNIA REPAIR Left     groin      JOINT REPLACEMTN, KNEE RT/LT Bilateral     one was in 1992 the other was in 2000. unsure which was which.       Social History   I have reviewed this patient's social history and updated it with pertinent information if needed.  Social History     Tobacco Use     Smoking status: Never Smoker     Smokeless tobacco: Never Used   Substance Use Topics     Alcohol use: Yes     Comment: beer occasionally     Drug use: No       Family History   I have reviewed this patient's family history and updated it with pertinent information if needed.   Family History   Problem Relation Age of Onset     Diabetes Mother      Cancer Mother         Vital signs:  Temp: 99.1  F (37.3  C) Temp src: Tympanic BP: 109/74 Pulse: 116 Heart Rate: 68 Resp: 16 SpO2: 99 % O2 Device: None (Room air)     Weight: 82.3 kg (181 lb 7 oz)  Estimated body mass index is 27.42 kg/m  as calculated from the following:    Height as of 2/11/20: 1.733 m (5' 8.21\").    Weight as of this encounter: 82.3 kg (181 lb 7 oz).            Prior to Admission Medications   Prior to Admission Medications   Prescriptions Last Dose Informant Patient Reported? Taking?   Cholecalciferol (VITAMIN D) 2000 UNITS CAPS 2/20/2020 at 1800  Yes Yes   Sig: Take 1 capsule by mouth daily    amoxicillin (AMOXIL) 500 MG capsule Unknown at Unknown time  Yes No   Sig: Takes one dose prior to dental care.   aspirin 81 MG tablet 2/20/2020 at 1800  Yes Yes   cyanocobalamin (VITAMIN B-12) 1000 MCG tablet 2/20/2020 at 1800  Yes Yes   Sig: Take 1,000 mcg by mouth daily   finasteride (PROSCAR) 5 MG tablet " 2/21/2020 at 0800  Yes Yes   Sig: TAKE 1 TABLET BY MOUTH DAILY   ibuprofen (ADVIL) 200 MG capsule Unknown at Unknown time  Yes No   lisinopril (PRINIVIL/ZESTRIL) 20 MG tablet 2/20/2020 at 1800  Yes Yes   Sig: Take 20 mg by mouth daily   metFORMIN (GLUCOPHAGE-XR) 500 MG 24 hr tablet 2/21/2020 at 0800  Yes Yes   Sig: Take 1,000 mg in AM and 500 mg in PM. Swallow tablet whole; do not crush, divide or chew.      Facility-Administered Medications: None     Allergies   Allergies   Allergen Reactions     Alatrofloxacin      No Clinical Screening - See Comments      Some kind of anesthetic       Physical Exam   Vital Signs: Temp: 99.1  F (37.3  C) Temp src: Tympanic BP: 110/68 Pulse: 116 Heart Rate: 62 Resp: 22 SpO2: 100 % O2 Device: None (Room air)    Weight: 181 lbs 7.02 oz    Case reviewed with the day Hospitalist, EHR reviewed; patient seen in ER room 3 with his wife and son present.        General: No distress, interactive  Head: normocephalic, no obvious trauma  Eyes: Gaze directed normally, sclera clear, no discharge, no abnormal ocular movements  Ears: Normal appearing age-related external ears, no discharge, stable hearing acuity loss  Nose: Normal age-related appearance  Mouth: Normal appearing oral mucosa, Gums and throat appear age-related normal  Neck: Normal age-related appearance, age-related range of motion, supple, no adenopathy  Pulmonary: Normal work of breathing, no expiratory delay, no coarseness, no wheezing  Cardiovascular: Distant heart sounds, irregular rhythm. With return to NS, HR was in the low 60's, similar to office visit note recorded results  Abdomen: No obvious distention, soft, bowel sounds present with normal frequency and pitch  Rectal: Deferred  Back: Age-related normal  Skin: Age-related normal, no rashes  Extremities: Not tender, no lower extremity edema. Moving upper and lower extremities  Neurological: Grossly in tact  Psychiatric: Mood is stable, appropriately interactive    Data    Data reviewed today: I reviewed all medications, new labs and imaging results over the last 24 hours

## 2020-02-22 NOTE — PLAN OF CARE
Pt had a lizabeth. He is pain free and afebrile. He remained in SR and Sinus Jean Marie 50-60's all night after receiving his oral metoprolol as ordered. His SBP were a bit soft but his MAP remained adequate. He was a bit forgetful but otherwise orientated and able to follow directions. He was up to bed side commode with assist of 2 without difficulty. Repeat lactic as drawn per BPA was 0.9. Pt takes metformin at home. 0030 accucheck was 125. Dr Thibodeaux aware. He is takingoralfluids and eating without N/V. AM labs ordered. Willl continue panof care.

## 2020-02-22 NOTE — PLAN OF CARE
[facility] Inpatient Admission Note:    Patient admitted to 3124/3124-1 at approximately 1750 via cart accompanied by spouse and son from emergency room . Report received from Starr RN in SBAR format at 1715 via telephone and face to face  Patient ambulated to bed via assist x1. Patient is alert and oriented X 3, denies pain; rates at 0 on 0-10 scale.  Patient oriented to room, unit, hourly rounding, and plan of care. Explained admission packet and patient handbook with patient bill of rights brochure. Will continue to monitor and document as needed.     Inpatient Nursing criteria listed below was met:    Health care directives status obtained and documented: Yes    Care Everywhere authorization obtained No    MRSA swab completed for patient 65 years and older: Yes    Patient identifies a surrogate decision maker: Yes If yes, who: Contact Information:     If initial lactic acid >2.0, repeat lactic acid drawn within one hour of arrival to unit: No. If no, state reason: initial lactic 2.0    Vaccination assessment and education completed: Yes   Vaccinations received prior to admission: Pneumovax yes  Influenza(seasonal)  YES   Vaccination(s) ordered:     Clergy visit ordered if patient requests: Yes    Skin issues/needs documented: No    Isolation Patient: yes Education given, correct sign in place and documentation row added to PCS:  Yes    Fall Prevention Yes: Care plan updated, education given and documented, sticker and magnet in place: Yes    Care Plan initiated: Yes    Education Documented (including assessment): Yes    Patient has discharge needs : No If yes, please explain:pt denies at this time

## 2020-02-22 NOTE — PROVIDER NOTIFICATION
Discussed plan of care with Dr Thibodeaux. Aware pt's SBP on the low side while sleeping MAP remains > 60. Pt also take metformin at home. Reviewed A1c . OK to saline lock IV. Will restart meds as at home in the am.

## 2020-02-22 NOTE — PLAN OF CARE
Patient discharged at 3:40 PM via wheel chair accompanied by son and staff. Prescriptions sent to patients preferred pharmacy. All belongings sent with patient.     Discharge instructions reviewed with pt and son. Listed belongings gathered and returned to patient. All personal    Patient discharged to home.   Report called to na    Core Measures and Vaccines  Core Measures applicable during stay: No. If yes, state diagnosis: na  Pneumonia and Influenza given prior to discharge, if indicated: N/A    Surgical Patient   Surgical Procedures during stay: none  Did patient receive discharge instruction on wound care and recognition of infection symptoms? Yes    MISC  Follow up appointment made:  Yes  Home and hospital aquired medications returned to patient: N/A  Patient reports pain was well managed at discharge: Yes. Pt denies.

## 2020-02-22 NOTE — PROVIDER NOTIFICATION
DATE:  2/22/2020   TIME OF RECEIPT FROM LAB:  1321  LAB TEST:  troponin  LAB VALUE:  1.175  RESULTS GIVEN WITH READ-BACK TO (PROVIDER):  Claudia Goldberg NP  TIME LAB VALUE REPORTED TO PROVIDER:   5813

## 2020-02-22 NOTE — PROVIDER NOTIFICATION
DATE:  2/22/2020   TIME OF RECEIPT FROM LAB:  0456LAB TEST:  troponin  LAB VALUE:  1.452  RESULTS GIVEN WITH READ-BACK TO (PROVIDER):  Glen  TIME LAB VALUE REPORTED TO PROVIDER:   0459    No new orders.

## 2020-02-22 NOTE — PLAN OF CARE
Face to face report given with opportunity to observe patient.    Report given to Rudolph RN's  Monica Cisse RN   2/22/2020  7:23 AM

## 2020-02-23 LAB
BACTERIA SPEC CULT: NORMAL
SPECIMEN SOURCE: NORMAL

## 2020-02-24 ENCOUNTER — TRANSFERRED RECORDS (OUTPATIENT)
Dept: HEALTH INFORMATION MANAGEMENT | Facility: CLINIC | Age: 85
End: 2020-02-24

## 2020-02-24 NOTE — PROGRESS NOTES
Subjective     Jeronimo Payton is a 89 year old male who presents to clinic today for the following health issues:    [facility]   Hospital Followup:    [place]  [facility]  Date of visit: 2/21-2/22/20  Reason for visit: A-fib  Current Status: feeling good. Chase (son) feels he is weak. No other concerns.       PROBLEMS TO ADD ON...still weak.  Tough to get him out right now with the weakness and the gait instability.  Had afib, which converted spontaneously and likely due to having influenza.  No ongoing cough.  Doing well otherwise.  We reviewed options at length with son here as well.  See below.     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 unsure which was which.       Social History     Tobacco Use     Smoking status: Never Smoker     Smokeless tobacco: Never Used   Substance Use Topics     Alcohol use: Yes     Comment: beer occasionally     Family History   Problem Relation Age of Onset     Diabetes Mother      Cancer Mother          Current Outpatient Medications   Medication Sig Dispense Refill     aspirin 81 MG tablet        Cholecalciferol (VITAMIN D) 2000 UNITS CAPS Take 1 capsule by mouth daily        cyanocobalamin (VITAMIN B-12) 1000 MCG tablet Take 1,000 mcg by mouth daily       finasteride (PROSCAR) 5 MG tablet TAKE 1 TABLET BY MOUTH DAILY       ibuprofen  (ADVIL) 200 MG capsule        lisinopril (PRINIVIL/ZESTRIL) 20 MG tablet Take 20 mg by mouth daily  1     metFORMIN (GLUCOPHAGE-XR) 500 MG 24 hr tablet Take 1,000 mg in AM and 500 mg in PM. Swallow tablet whole; do not crush, divide or chew.       metoprolol succinate ER (TOPROL-XL) 25 MG 24 hr tablet Take 0.5 tablets (12.5 mg) by mouth daily 90 tablet 3     amoxicillin (AMOXIL) 500 MG capsule Takes one dose prior to dental care.  1     Allergies   Allergen Reactions     Alatrofloxacin      No Clinical Screening - See Comments      Some kind of anesthetic       PROBLEMS TO ADD ON...  Reviewed and updated as needed this visit by Provider         Review of Systems   ROS COMP: Constitutional, HEENT, cardiovascular, pulmonary, gi and gu systems are negative, except as otherwise noted.      Objective    There were no vitals taken for this visit.  There is no height or weight on file to calculate BMI.  Physical Exam   GENERAL: healthy, alert and no distress  NECK: no adenopathy, no asymmetry, masses, or scars and thyroid normal to palpation  RESP: lungs clear to auscultation - no rales, rhonchi or wheezes  CV: regular rate and rhythm, normal S1 S2, no S3 or S4, no murmur, click or rub, no peripheral edema and peripheral pulses strong  ABDOMEN: soft, nontender, no hepatosplenomegaly, no masses and bowel sounds normal  MS: no gross musculoskeletal defects noted, no edema    Diagnostic Test Results:  Labs reviewed in Epic        Assessment & Plan       ICD-10-CM    1. Paroxysmal atrial fibrillation (H) I48.0 HOME CARE NURSING REFERRAL   2. Essential hypertension I10 HOME CARE NURSING REFERRAL   3. 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 Influenza B J10.1 HOME CARE NURSING REFERRAL   5. Gait instability R26.81 HOME CARE NURSING REFERRAL   6. Atrial fibrillation with RVR (H) I48.91 metoprolol succinate ER (TOPROL-XL) 25 MG 24 hr tablet      absolutely in sinus to auscultation right  "now.  Reviewed his CHADS score and indication for anticoagulation.  He has had a couple of falls this year.  Walks with a cane.  Reviewed risk/benefit.  Opt to stick with the asa right now.  Offer cardiology as well.  Update labs stable from the hospital, and none needed today.  Overall doing fine now other than the weakness and gait and the recent addition of toprol for rate control.  bp stable.  Update and get home cares, at least temporarily for him.  His over 90 year old wife takes care of him mostly, and it's too tough right now.  F/u routine.    BMI:   Estimated body mass index is 27.35 kg/m  as calculated from the following:    Height as of 2/11/20: 1.733 m (5' 8.21\").    Weight as of this encounter: 82.1 kg (181 lb).               No follow-ups on file.    Eloy Conde MD  [facility]      "

## 2020-02-24 NOTE — PLAN OF CARE
Physical Therapy Discharge Summary    Reason for therapy discharge:    Discharged to home with outpatient therapy.    Progress towards therapy goal(s). See goals on Care Plan in [facility] electronic health record for goal details.  Goals not met.  Barriers to achieving goals:   discharge on same date as initial evaluation.    Therapy recommendation(s):    Continued therapy is recommended.  Rationale/Recommendations:  per evaluating therapist, recommended outpatient PT upon discharge..

## 2020-02-27 ENCOUNTER — OFFICE VISIT (OUTPATIENT)
Dept: FAMILY MEDICINE | Facility: OTHER | Age: 85
End: 2020-02-27
Attending: FAMILY MEDICINE
Payer: MEDICARE

## 2020-02-27 VITALS
DIASTOLIC BLOOD PRESSURE: 58 MMHG | HEART RATE: 61 BPM | BODY MASS INDEX: 27.35 KG/M2 | OXYGEN SATURATION: 98 % | SYSTOLIC BLOOD PRESSURE: 118 MMHG | WEIGHT: 181 LBS

## 2020-02-27 DIAGNOSIS — J10.1 INFLUENZA B: ICD-10-CM

## 2020-02-27 DIAGNOSIS — I10 ESSENTIAL HYPERTENSION: ICD-10-CM

## 2020-02-27 DIAGNOSIS — I48.91 ATRIAL FIBRILLATION WITH RVR (H): ICD-10-CM

## 2020-02-27 DIAGNOSIS — I48.0 PAROXYSMAL ATRIAL FIBRILLATION (H): Primary | ICD-10-CM

## 2020-02-27 DIAGNOSIS — R26.81 GAIT INSTABILITY: ICD-10-CM

## 2020-02-27 DIAGNOSIS — N18.31 CKD STAGE G3A/A2, GFR 45-59 AND ALBUMIN CREATININE RATIO 30-299 MG/G (H): ICD-10-CM

## 2020-02-27 PROCEDURE — G0463 HOSPITAL OUTPT CLINIC VISIT: HCPCS

## 2020-02-27 PROCEDURE — 99214 OFFICE O/P EST MOD 30 MIN: CPT | Performed by: FAMILY MEDICINE

## 2020-02-27 RX ORDER — METOPROLOL SUCCINATE 25 MG/1
12.5 TABLET, EXTENDED RELEASE ORAL DAILY
Qty: 90 TABLET | Refills: 3 | Status: SHIPPED | OUTPATIENT
Start: 2020-02-27 | End: 2021-05-10

## 2020-02-27 ASSESSMENT — PAIN SCALES - GENERAL: PAINLEVEL: NO PAIN (0)

## 2020-02-27 NOTE — NURSING NOTE
"Chief Complaint   Patient presents with     Hospital F/U       Initial /58   Pulse 61   Wt 82.1 kg (181 lb)   SpO2 98%   BMI 27.35 kg/m   Estimated body mass index is 27.35 kg/m  as calculated from the following:    Height as of 2/11/20: 1.733 m (5' 8.21\").    Weight as of this encounter: 82.1 kg (181 lb).  Medication Reconciliation: complete  Shante Larson MA    "

## 2020-03-18 ENCOUNTER — DOCUMENTATION ONLY (OUTPATIENT)
Dept: OTHER | Facility: CLINIC | Age: 85
End: 2020-03-18

## 2020-03-18 DIAGNOSIS — Z53.9 PERSONS ENCOUNTERING HEALTH SERVICES FOR SPECIFIC PROCEDURES, NOT CARRIED OUT: Primary | ICD-10-CM

## 2020-03-18 PROCEDURE — G0180 MD CERTIFICATION HHA PATIENT: HCPCS | Performed by: FAMILY MEDICINE

## 2020-03-31 ENCOUNTER — TRANSFERRED RECORDS (OUTPATIENT)
Dept: HEALTH INFORMATION MANAGEMENT | Facility: CLINIC | Age: 85
End: 2020-03-31

## 2020-04-06 ENCOUNTER — TELEPHONE (OUTPATIENT)
Dept: FAMILY MEDICINE | Facility: OTHER | Age: 85
End: 2020-04-06

## 2020-04-06 NOTE — TELEPHONE ENCOUNTER
Please advise message. It looks like 3 refills pending at pharmacy so Im thinking you want him on med. But just incase.     YARON Morfin

## 2020-04-06 NOTE — TELEPHONE ENCOUNTER
Patient wife calling stating he was in the ED on 2/21/2020 and was given metoprolol and he now is down to about 8 days left. They are unsure if he should continue this medication or if this was just take until it is out. He will need a refill on this. His son brought him to his last appointment and they were unsure what Dr. Conde had said about continuing to take it or just finishing what he had. Please call and advise. Ashley A. Lechevalier, LPN on 4/6/2020 at 2:02 PM

## 2020-05-13 ENCOUNTER — TRANSFERRED RECORDS (OUTPATIENT)
Dept: HEALTH INFORMATION MANAGEMENT | Facility: CLINIC | Age: 85
End: 2020-05-13

## 2020-05-21 DIAGNOSIS — E11.9 TYPE 2 DIABETES MELLITUS WITHOUT COMPLICATION, WITHOUT LONG-TERM CURRENT USE OF INSULIN (H): Primary | ICD-10-CM

## 2020-05-21 RX ORDER — METFORMIN HCL 500 MG
TABLET, EXTENDED RELEASE 24 HR ORAL
Qty: 270 TABLET | Refills: 3 | Status: SHIPPED | OUTPATIENT
Start: 2020-05-21 | End: 2021-03-04

## 2020-05-21 NOTE — TELEPHONE ENCOUNTER
metFORMIN (GLUCOPHAGE-XR) 500 MG 24 hr tablet       Last Written Prescription Date:  7/18/16  Last Fill Quantity: ,   # refills:   Last Office Visit: 2/27/20  Future Office visit:       Routing refill request to provider for review/approval because:  Medication is reported/historical

## 2020-05-27 ENCOUNTER — TRANSFERRED RECORDS (OUTPATIENT)
Dept: HEALTH INFORMATION MANAGEMENT | Facility: CLINIC | Age: 85
End: 2020-05-27

## 2020-05-27 LAB — RETINOPATHY: NEGATIVE

## 2020-05-29 DIAGNOSIS — N40.0 BENIGN PROSTATIC HYPERPLASIA, UNSPECIFIED WHETHER LOWER URINARY TRACT SYMPTOMS PRESENT: Primary | ICD-10-CM

## 2020-05-29 NOTE — TELEPHONE ENCOUNTER
finasteride      Last Written Prescription Date:  04/07/2016  Last Fill Quantity: N/A,   # refills: N/A  Last Office Visit: 02/27/2020  Future Office visit:       Routing refill request to provider for review/approval because:  Medication is reported/historical

## 2020-06-01 RX ORDER — FINASTERIDE 5 MG/1
TABLET, FILM COATED ORAL
Qty: 90 TABLET | Refills: 0 | Status: SHIPPED | OUTPATIENT
Start: 2020-06-01 | End: 2020-08-27

## 2020-06-01 NOTE — TELEPHONE ENCOUNTER
Proscar      Routing refill request to provider for review/approval because:  Medication is reported/historical

## 2020-06-03 ENCOUNTER — TRANSFERRED RECORDS (OUTPATIENT)
Dept: HEALTH INFORMATION MANAGEMENT | Facility: CLINIC | Age: 85
End: 2020-06-03

## 2020-09-09 ENCOUNTER — OFFICE VISIT (OUTPATIENT)
Dept: FAMILY MEDICINE | Facility: OTHER | Age: 85
End: 2020-09-09
Attending: FAMILY MEDICINE
Payer: MEDICARE

## 2020-09-09 VITALS
SYSTOLIC BLOOD PRESSURE: 102 MMHG | WEIGHT: 178 LBS | TEMPERATURE: 97.3 F | HEART RATE: 54 BPM | DIASTOLIC BLOOD PRESSURE: 58 MMHG | HEIGHT: 68 IN | OXYGEN SATURATION: 99 % | BODY MASS INDEX: 26.98 KG/M2

## 2020-09-09 DIAGNOSIS — N40.1 BENIGN NON-NODULAR PROSTATIC HYPERPLASIA WITH LOWER URINARY TRACT SYMPTOMS: ICD-10-CM

## 2020-09-09 DIAGNOSIS — E11.9 CONTROLLED TYPE 2 DIABETES MELLITUS WITHOUT COMPLICATION, UNSPECIFIED WHETHER LONG TERM INSULIN USE (H): Primary | ICD-10-CM

## 2020-09-09 DIAGNOSIS — N18.31 CKD STAGE G3A/A2, GFR 45-59 AND ALBUMIN CREATININE RATIO 30-299 MG/G (H): ICD-10-CM

## 2020-09-09 DIAGNOSIS — Z23 NEED FOR PROPHYLACTIC VACCINATION AND INOCULATION AGAINST INFLUENZA: ICD-10-CM

## 2020-09-09 LAB
ALBUMIN SERPL-MCNC: 3.8 G/DL (ref 3.4–5)
ALP SERPL-CCNC: 76 U/L (ref 40–150)
ALT SERPL W P-5'-P-CCNC: 22 U/L (ref 0–70)
ANION GAP SERPL CALCULATED.3IONS-SCNC: 5 MMOL/L (ref 3–14)
AST SERPL W P-5'-P-CCNC: 22 U/L (ref 0–45)
BILIRUB SERPL-MCNC: 0.7 MG/DL (ref 0.2–1.3)
BUN SERPL-MCNC: 25 MG/DL (ref 7–30)
CALCIUM SERPL-MCNC: 9.2 MG/DL (ref 8.5–10.1)
CHLORIDE SERPL-SCNC: 106 MMOL/L (ref 94–109)
CHOLEST SERPL-MCNC: 187 MG/DL
CO2 SERPL-SCNC: 26 MMOL/L (ref 20–32)
CREAT SERPL-MCNC: 1.02 MG/DL (ref 0.66–1.25)
CREAT UR-MCNC: 87 MG/DL
EST. AVERAGE GLUCOSE BLD GHB EST-MCNC: 146 MG/DL
GFR SERPL CREATININE-BSD FRML MDRD: 63 ML/MIN/{1.73_M2}
GLUCOSE SERPL-MCNC: 145 MG/DL (ref 70–99)
HBA1C MFR BLD: 6.7 % (ref 0–5.6)
HDLC SERPL-MCNC: 67 MG/DL
LDLC SERPL CALC-MCNC: 100 MG/DL
MICROALBUMIN UR-MCNC: 58 MG/L
MICROALBUMIN/CREAT UR: 66.28 MG/G CR (ref 0–17)
NONHDLC SERPL-MCNC: 120 MG/DL
POTASSIUM SERPL-SCNC: 4.7 MMOL/L (ref 3.4–5.3)
PROT SERPL-MCNC: 8.2 G/DL (ref 6.8–8.8)
SODIUM SERPL-SCNC: 137 MMOL/L (ref 133–144)
TRIGL SERPL-MCNC: 101 MG/DL

## 2020-09-09 PROCEDURE — G0463 HOSPITAL OUTPT CLINIC VISIT: HCPCS

## 2020-09-09 PROCEDURE — G0008 ADMIN INFLUENZA VIRUS VAC: HCPCS | Performed by: FAMILY MEDICINE

## 2020-09-09 PROCEDURE — 80053 COMPREHEN METABOLIC PANEL: CPT | Mod: ZL | Performed by: FAMILY MEDICINE

## 2020-09-09 PROCEDURE — 83036 HEMOGLOBIN GLYCOSYLATED A1C: CPT | Mod: ZL | Performed by: FAMILY MEDICINE

## 2020-09-09 PROCEDURE — 80061 LIPID PANEL: CPT | Mod: ZL | Performed by: FAMILY MEDICINE

## 2020-09-09 PROCEDURE — 36415 COLL VENOUS BLD VENIPUNCTURE: CPT | Mod: ZL | Performed by: FAMILY MEDICINE

## 2020-09-09 PROCEDURE — 82043 UR ALBUMIN QUANTITATIVE: CPT | Mod: ZL | Performed by: FAMILY MEDICINE

## 2020-09-09 PROCEDURE — 90662 IIV NO PRSV INCREASED AG IM: CPT

## 2020-09-09 PROCEDURE — 40000788 ZZHCL STATISTIC ESTIMATED AVERAGE GLUCOSE: Mod: ZL | Performed by: FAMILY MEDICINE

## 2020-09-09 PROCEDURE — 99214 OFFICE O/P EST MOD 30 MIN: CPT | Performed by: FAMILY MEDICINE

## 2020-09-09 ASSESSMENT — MIFFLIN-ST. JEOR: SCORE: 1426.9

## 2020-09-09 ASSESSMENT — PAIN SCALES - GENERAL: PAINLEVEL: NO PAIN (0)

## 2020-09-09 NOTE — NURSING NOTE
"Chief Complaint   Patient presents with     Diabetes       Initial /58   Pulse 54   Temp 97.3  F (36.3  C)   Ht 1.727 m (5' 8\")   Wt 80.7 kg (178 lb)   SpO2 99%   BMI 27.06 kg/m   Estimated body mass index is 27.06 kg/m  as calculated from the following:    Height as of this encounter: 1.727 m (5' 8\").    Weight as of this encounter: 80.7 kg (178 lb).  Medication Reconciliation: complete  Jeane Webster LPN  "

## 2020-10-13 DIAGNOSIS — E11.9 CONTROLLED TYPE 2 DIABETES MELLITUS WITHOUT COMPLICATION, UNSPECIFIED WHETHER LONG TERM INSULIN USE (H): Primary | ICD-10-CM

## 2020-10-13 DIAGNOSIS — I10 ESSENTIAL HYPERTENSION: ICD-10-CM

## 2020-10-13 RX ORDER — LISINOPRIL 20 MG/1
TABLET ORAL
Qty: 90 TABLET | Refills: 3 | Status: SHIPPED | OUTPATIENT
Start: 2020-10-13 | End: 2021-01-01

## 2020-10-13 NOTE — TELEPHONE ENCOUNTER
Lisinopril 20 mg      Last Written Prescription Date:  historical  Last Fill Quantity: 0,   # refills: 0  Last Office Visit: 9-9-2020  Future Office visit:

## 2020-12-07 ENCOUNTER — TELEPHONE (OUTPATIENT)
Dept: FAMILY MEDICINE | Facility: OTHER | Age: 85
End: 2020-12-07

## 2020-12-10 NOTE — PROGRESS NOTES
Subjective     Jeronimo Payton is a 89 year old male who presents to clinic today for the following health issues:    HPI         Face to face       Duration: ongoing    Description (location/character/radiation): Home care     Intensity:  moderate    Accompanying signs and symptoms: weakness    History (similar episodes/previous evaluation): None    Precipitating or alleviating factors: None    Therapies tried and outcome: requesting home care        Needing help in the home.  Doing ok.  Recent fall and neighbors came over to help him up.  His memory continues to fail him.  He and his 91 year old wife need help in the home with nursing, pills, and possibly home health aid and other services.      Review of Systems   Constitutional, HEENT, cardiovascular, pulmonary, gi and gu systems are negative, except as otherwise noted.      Objective    /64   Pulse 63   Temp 96.9  F (36.1  C)   Wt 83.9 kg (185 lb)   SpO2 99%   BMI 28.13 kg/m    Body mass index is 28.13 kg/m .  Physical Exam   GENERAL: healthy, alert and no distress  NECK: no adenopathy, no asymmetry, masses, or scars and thyroid normal to palpation  RESP: lungs clear to auscultation - no rales, rhonchi or wheezes  CV: regular rate and rhythm, normal S1 S2, no S3 or S4, no murmur, click or rub, no peripheral edema and peripheral pulses strong  ABDOMEN: soft, nontender, no hepatosplenomegaly, no masses and bowel sounds normal  MS: no gross musculoskeletal defects noted, no edema            Assessment & Plan     Memory loss  Ongoing, stable.  But definitely a big issue and becoming more of one.  Got home cares set up.  Will follow closely.    - HOME CARE NURSING REFERRAL    Paroxysmal atrial fibrillation (H)  Stable.    - HOME CARE NURSING REFERRAL    Recurrent falls  Out of bed last fall.  Consider bed with rails, etc.  I will await home care assessment for guidance as well and I told them this.  Son very involved in cares, lives locally, and very  "gracious.    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    - HOME CARE NURSING REFERRAL     BMI:   Estimated body mass index is 28.13 kg/m  as calculated from the following:    Height as of 9/9/20: 1.727 m (5' 8\").    Weight as of this encounter: 83.9 kg (185 lb).                No follow-ups on file.    Eloy Conde MD  [facility]    "

## 2020-12-14 ENCOUNTER — OFFICE VISIT (OUTPATIENT)
Dept: FAMILY MEDICINE | Facility: OTHER | Age: 85
End: 2020-12-14
Attending: FAMILY MEDICINE
Payer: MEDICARE

## 2020-12-14 VITALS
TEMPERATURE: 96.9 F | BODY MASS INDEX: 28.13 KG/M2 | WEIGHT: 185 LBS | HEART RATE: 63 BPM | DIASTOLIC BLOOD PRESSURE: 64 MMHG | SYSTOLIC BLOOD PRESSURE: 112 MMHG | OXYGEN SATURATION: 99 %

## 2020-12-14 DIAGNOSIS — N18.31 CKD STAGE G3A/A2, GFR 45-59 AND ALBUMIN CREATININE RATIO 30-299 MG/G (H): ICD-10-CM

## 2020-12-14 DIAGNOSIS — R41.3 MEMORY LOSS: Primary | ICD-10-CM

## 2020-12-14 DIAGNOSIS — I48.0 PAROXYSMAL ATRIAL FIBRILLATION (H): ICD-10-CM

## 2020-12-14 DIAGNOSIS — R29.6 RECURRENT FALLS: ICD-10-CM

## 2020-12-14 PROCEDURE — 99214 OFFICE O/P EST MOD 30 MIN: CPT | Performed by: FAMILY MEDICINE

## 2020-12-14 PROCEDURE — G0463 HOSPITAL OUTPT CLINIC VISIT: HCPCS

## 2020-12-14 ASSESSMENT — PAIN SCALES - GENERAL: PAINLEVEL: NO PAIN (0)

## 2020-12-14 NOTE — NURSING NOTE
"Chief Complaint   Patient presents with     Clinic Care Coordination - Face To Face       Initial /64   Pulse 63   Temp 96.9  F (36.1  C)   Wt 83.9 kg (185 lb)   SpO2 99%   BMI 28.13 kg/m   Estimated body mass index is 28.13 kg/m  as calculated from the following:    Height as of 9/9/20: 1.727 m (5' 8\").    Weight as of this encounter: 83.9 kg (185 lb).  Medication Reconciliation: complete  Shelbie Brown  "

## 2020-12-31 DIAGNOSIS — E11.9 CONTROLLED TYPE 2 DIABETES MELLITUS WITHOUT COMPLICATION, UNSPECIFIED WHETHER LONG TERM INSULIN USE (H): Primary | ICD-10-CM

## 2020-12-31 RX ORDER — LANCETS
EACH MISCELLANEOUS
Qty: 100 EACH | Refills: 0 | Status: SHIPPED | OUTPATIENT
Start: 2020-12-31 | End: 2021-07-28

## 2020-12-31 RX ORDER — BLOOD-GLUCOSE METER
EACH MISCELLANEOUS
Qty: 1 KIT | Refills: 0 | Status: SHIPPED | OUTPATIENT
Start: 2020-12-31 | End: 2021-08-03

## 2020-12-31 RX ORDER — BLOOD-GLUCOSE CONTROL, NORMAL
EACH MISCELLANEOUS
Qty: 1 BOTTLE | Refills: 11 | Status: SHIPPED | OUTPATIENT
Start: 2020-12-31 | End: 2021-07-28

## 2021-01-01 ENCOUNTER — TELEPHONE (OUTPATIENT)
Dept: FAMILY MEDICINE | Facility: OTHER | Age: 86
End: 2021-01-01
Payer: MEDICARE

## 2021-01-01 ENCOUNTER — OFFICE VISIT (OUTPATIENT)
Dept: FAMILY MEDICINE | Facility: OTHER | Age: 86
End: 2021-01-01
Attending: FAMILY MEDICINE
Payer: MEDICARE

## 2021-01-01 ENCOUNTER — TRANSFERRED RECORDS (OUTPATIENT)
Dept: HEALTH INFORMATION MANAGEMENT | Facility: OTHER | Age: 86
End: 2021-01-01
Payer: MEDICARE

## 2021-01-01 VITALS
RESPIRATION RATE: 16 BRPM | HEART RATE: 59 BPM | DIASTOLIC BLOOD PRESSURE: 68 MMHG | SYSTOLIC BLOOD PRESSURE: 118 MMHG | OXYGEN SATURATION: 100 % | TEMPERATURE: 97.3 F | BODY MASS INDEX: 26.66 KG/M2 | WEIGHT: 180 LBS | HEIGHT: 69 IN

## 2021-01-01 DIAGNOSIS — I10 ESSENTIAL HYPERTENSION: ICD-10-CM

## 2021-01-01 DIAGNOSIS — N40.0 BENIGN PROSTATIC HYPERPLASIA, UNSPECIFIED WHETHER LOWER URINARY TRACT SYMPTOMS PRESENT: ICD-10-CM

## 2021-01-01 DIAGNOSIS — E11.9 CONTROLLED TYPE 2 DIABETES MELLITUS WITHOUT COMPLICATION, UNSPECIFIED WHETHER LONG TERM INSULIN USE (H): ICD-10-CM

## 2021-01-01 DIAGNOSIS — Z00.00 ENCOUNTER FOR MEDICARE ANNUAL WELLNESS EXAM: Primary | ICD-10-CM

## 2021-01-01 DIAGNOSIS — F03.90 SENILE DEMENTIA WITHOUT BEHAVIORAL DISTURBANCE (H): ICD-10-CM

## 2021-01-01 DIAGNOSIS — E11.9 CONTROLLED TYPE 2 DIABETES MELLITUS WITHOUT COMPLICATION, WITHOUT LONG-TERM CURRENT USE OF INSULIN (H): ICD-10-CM

## 2021-01-01 LAB
ANION GAP SERPL CALCULATED.3IONS-SCNC: 7 MMOL/L (ref 3–14)
BUN SERPL-MCNC: 27 MG/DL (ref 7–25)
CALCIUM SERPL-MCNC: 9.7 MG/DL (ref 8.6–10.3)
CHLORIDE BLD-SCNC: 105 MMOL/L (ref 98–107)
CHOLEST SERPL-MCNC: 187 MG/DL
CO2 SERPL-SCNC: 27 MMOL/L (ref 21–31)
CREAT SERPL-MCNC: 1.05 MG/DL (ref 0.7–1.3)
CREAT UR-MCNC: 120 MG/DL
FASTING STATUS PATIENT QL REPORTED: ABNORMAL
GFR SERPL CREATININE-BSD FRML MDRD: 60 ML/MIN/1.73M2
GLUCOSE BLD-MCNC: 138 MG/DL (ref 70–105)
HBA1C MFR BLD: 7.1 % (ref 4–6.2)
HDLC SERPL-MCNC: 51 MG/DL (ref 23–92)
LDLC SERPL CALC-MCNC: 112 MG/DL
MICROALBUMIN UR-MCNC: 109 MG/L
MICROALBUMIN/CREAT UR: 90.83 MG/G CR (ref 0–17)
NONHDLC SERPL-MCNC: 136 MG/DL
POTASSIUM BLD-SCNC: 4.3 MMOL/L (ref 3.5–5.1)
SODIUM SERPL-SCNC: 139 MMOL/L (ref 134–144)
TRIGL SERPL-MCNC: 119 MG/DL

## 2021-01-01 PROCEDURE — 80061 LIPID PANEL: CPT | Mod: ZL | Performed by: FAMILY MEDICINE

## 2021-01-01 PROCEDURE — 36415 COLL VENOUS BLD VENIPUNCTURE: CPT | Mod: ZL | Performed by: FAMILY MEDICINE

## 2021-01-01 PROCEDURE — G0463 HOSPITAL OUTPT CLINIC VISIT: HCPCS

## 2021-01-01 PROCEDURE — 99213 OFFICE O/P EST LOW 20 MIN: CPT | Mod: 25 | Performed by: FAMILY MEDICINE

## 2021-01-01 PROCEDURE — 82043 UR ALBUMIN QUANTITATIVE: CPT | Mod: ZL | Performed by: FAMILY MEDICINE

## 2021-01-01 PROCEDURE — G0439 PPPS, SUBSEQ VISIT: HCPCS | Performed by: FAMILY MEDICINE

## 2021-01-01 PROCEDURE — 80048 BASIC METABOLIC PNL TOTAL CA: CPT | Mod: ZL | Performed by: FAMILY MEDICINE

## 2021-01-01 PROCEDURE — 83036 HEMOGLOBIN GLYCOSYLATED A1C: CPT | Mod: ZL | Performed by: FAMILY MEDICINE

## 2021-01-01 RX ORDER — LISINOPRIL 20 MG/1
20 TABLET ORAL DAILY
Qty: 90 TABLET | Refills: 3 | Status: SHIPPED | OUTPATIENT
Start: 2021-01-01

## 2021-01-01 RX ORDER — LISINOPRIL 20 MG/1
TABLET ORAL
Qty: 90 TABLET | Refills: 0 | OUTPATIENT
Start: 2021-01-01

## 2021-01-01 RX ORDER — FINASTERIDE 5 MG/1
TABLET, FILM COATED ORAL
Qty: 90 TABLET | Refills: 0 | OUTPATIENT
Start: 2021-01-01

## 2021-01-01 RX ORDER — FINASTERIDE 5 MG/1
1 TABLET, FILM COATED ORAL DAILY
Qty: 90 TABLET | Refills: 3 | Status: SHIPPED | OUTPATIENT
Start: 2021-01-01

## 2021-01-01 ASSESSMENT — MIFFLIN-ST. JEOR: SCORE: 1442.88

## 2021-01-01 ASSESSMENT — PAIN SCALES - GENERAL: PAINLEVEL: NO PAIN (0)

## 2021-01-05 DIAGNOSIS — Z53.9 PERSONS ENCOUNTERING HEALTH SERVICES FOR SPECIFIC PROCEDURES, NOT CARRIED OUT: Primary | ICD-10-CM

## 2021-01-05 PROCEDURE — G0180 MD CERTIFICATION HHA PATIENT: HCPCS | Performed by: FAMILY MEDICINE

## 2021-01-27 ENCOUNTER — TELEPHONE (OUTPATIENT)
Dept: FAMILY MEDICINE | Facility: OTHER | Age: 86
End: 2021-01-27

## 2021-01-27 DIAGNOSIS — R29.6 RECURRENT FALLS: Primary | ICD-10-CM

## 2021-01-27 DIAGNOSIS — Z74.09 DECREASED AMBULATION STATUS: ICD-10-CM

## 2021-01-27 NOTE — TELEPHONE ENCOUNTER
[facility] from [facility] is requesting a rx for front wheeled walker to be faxed to [facility] for decreased ability to ambulate and falls.  Rx pended.

## 2021-03-04 DIAGNOSIS — E11.9 CONTROLLED TYPE 2 DIABETES MELLITUS WITHOUT COMPLICATION, UNSPECIFIED WHETHER LONG TERM INSULIN USE (H): ICD-10-CM

## 2021-03-04 DIAGNOSIS — E11.9 TYPE 2 DIABETES MELLITUS WITHOUT COMPLICATION, WITHOUT LONG-TERM CURRENT USE OF INSULIN (H): ICD-10-CM

## 2021-03-04 RX ORDER — METFORMIN HCL 500 MG
TABLET, EXTENDED RELEASE 24 HR ORAL
Qty: 270 TABLET | Refills: 3 | Status: SHIPPED | OUTPATIENT
Start: 2021-03-04 | End: 2021-01-01

## 2021-05-10 DIAGNOSIS — I48.91 ATRIAL FIBRILLATION WITH RVR (H): ICD-10-CM

## 2021-05-10 RX ORDER — METOPROLOL SUCCINATE 25 MG/1
TABLET, EXTENDED RELEASE ORAL
Qty: 45 TABLET | Refills: 0 | Status: SHIPPED | OUTPATIENT
Start: 2021-05-10 | End: 2021-07-28

## 2021-05-10 NOTE — TELEPHONE ENCOUNTER
Metoprolol  Last Written Prescription Date: 2/27/20  Last Fill Quantity: 90 # of Refills: 3  Last Office Visit: 12/14/20

## 2021-06-28 ENCOUNTER — HOSPITAL ENCOUNTER (EMERGENCY)
Facility: OTHER | Age: 86
Discharge: HOME | End: 2021-06-28
Attending: FAMILY MEDICINE | Admitting: FAMILY MEDICINE
Payer: MEDICARE

## 2021-06-28 VITALS
SYSTOLIC BLOOD PRESSURE: 114 MMHG | RESPIRATION RATE: 16 BRPM | DIASTOLIC BLOOD PRESSURE: 71 MMHG | WEIGHT: 188.27 LBS | TEMPERATURE: 96.6 F | OXYGEN SATURATION: 100 % | BODY MASS INDEX: 26.95 KG/M2 | HEART RATE: 59 BPM | HEIGHT: 70 IN

## 2021-06-28 DIAGNOSIS — R31.9 PAINLESS HEMATURIA: ICD-10-CM

## 2021-06-28 LAB
ALBUMIN UR-MCNC: 100 MG/DL
APPEARANCE UR: ABNORMAL
BASOPHILS # BLD AUTO: 0.1 10E9/L (ref 0–0.2)
BASOPHILS NFR BLD AUTO: 1 %
BILIRUB UR QL STRIP: NEGATIVE
COLOR UR AUTO: ABNORMAL
DIFFERENTIAL METHOD BLD: ABNORMAL
EOSINOPHIL # BLD AUTO: 0.8 10E9/L (ref 0–0.7)
EOSINOPHIL NFR BLD AUTO: 12 %
ERYTHROCYTE [DISTWIDTH] IN BLOOD BY AUTOMATED COUNT: 13.2 % (ref 10–15)
GLUCOSE UR STRIP-MCNC: NEGATIVE MG/DL
HCT VFR BLD AUTO: 37 % (ref 40–53)
HGB BLD-MCNC: 12.5 G/DL (ref 13.3–17.7)
HGB UR QL STRIP: ABNORMAL
IMM GRANULOCYTES # BLD: 0 10E9/L (ref 0–0.4)
IMM GRANULOCYTES NFR BLD: 0.5 %
KETONES UR STRIP-MCNC: NEGATIVE MG/DL
LEUKOCYTE ESTERASE UR QL STRIP: ABNORMAL
LYMPHOCYTES # BLD AUTO: 1.2 10E9/L (ref 0.8–5.3)
LYMPHOCYTES NFR BLD AUTO: 19 %
MCH RBC QN AUTO: 31.5 PG (ref 26.5–33)
MCHC RBC AUTO-ENTMCNC: 33.8 G/DL (ref 31.5–36.5)
MCV RBC AUTO: 93 FL (ref 78–100)
MONOCYTES # BLD AUTO: 0.6 10E9/L (ref 0–1.3)
MONOCYTES NFR BLD AUTO: 9.2 %
NEUTROPHILS # BLD AUTO: 3.7 10E9/L (ref 1.6–8.3)
NEUTROPHILS NFR BLD AUTO: 58.3 %
NITRATE UR QL: NEGATIVE
PH UR STRIP: 6 PH (ref 5–7)
PLATELET # BLD AUTO: 153 10E9/L (ref 150–450)
RBC # BLD AUTO: 3.97 10E12/L (ref 4.4–5.9)
RBC #/AREA URNS AUTO: >100 /HPF
SOURCE: ABNORMAL
SP GR UR STRIP: 1.02 (ref 1–1.03)
UROBILINOGEN UR STRIP-ACNC: 0.2 EU/DL (ref 0.2–1)
WBC # BLD AUTO: 6.3 10E9/L (ref 4–11)
WBC #/AREA URNS AUTO: >100 /HPF

## 2021-06-28 PROCEDURE — 85025 COMPLETE CBC W/AUTO DIFF WBC: CPT | Performed by: FAMILY MEDICINE

## 2021-06-28 PROCEDURE — 99283 EMERGENCY DEPT VISIT LOW MDM: CPT | Performed by: FAMILY MEDICINE

## 2021-06-28 PROCEDURE — 81001 URINALYSIS AUTO W/SCOPE: CPT | Performed by: FAMILY MEDICINE

## 2021-06-28 PROCEDURE — 36415 COLL VENOUS BLD VENIPUNCTURE: CPT | Performed by: FAMILY MEDICINE

## 2021-06-28 PROCEDURE — 87086 URINE CULTURE/COLONY COUNT: CPT | Performed by: FAMILY MEDICINE

## 2021-06-28 ASSESSMENT — ENCOUNTER SYMPTOMS
NECK STIFFNESS: 0
HEMATURIA: 1
FEVER: 0
DIFFICULTY URINATING: 0
HEADACHES: 0
ABDOMINAL PAIN: 0
COLOR CHANGE: 0
CONFUSION: 0
ARTHRALGIAS: 0
SHORTNESS OF BREATH: 0
EYE REDNESS: 0

## 2021-06-28 ASSESSMENT — MIFFLIN-ST. JEOR: SCORE: 1505.25

## 2021-06-28 NOTE — ED PROVIDER NOTES
"  History     Chief Complaint   Patient presents with     Hematuria     HPI  Jeronimo Payton is a 89 year old male who woke up passing bright red blood.  No pain.  No burning with urination.  No frequency, no back pain.  No previous trauma or similar episodes in the past.  Patient is on aspirin for A. fib but no other blood thinners.    Reviewed nurses notes below, similar history is related to me.   ED Nursing Triage Note (General)   ________________________________     Jeronimo Payton is a 89 year old Male that presents to triage private car  With history of hematuria this AM, \"It was pretty red.\" pt denies having pain or difficulty voiding, reported by patient   Significant symptoms had onset today.       Allergies:  Allergies   Allergen Reactions     Alatrofloxacin      No Clinical Screening - See Comments      Some kind of anesthetic       Problem List:    Patient Active Problem List    Diagnosis Date Noted     A-fib (H) 02/21/2020     Priority: Medium     CKD stage G3a/A2, GFR 45-59 and albumin creatinine ratio  mg/g 04/25/2017     Priority: Medium     Vitamin D deficiency 12/03/2013     Priority: Medium     Benign non-nodular prostatic hyperplasia with lower urinary tract symptoms 10/22/2008     Priority: Medium     Hyperlipidemia 12/01/2006     Priority: Medium     Overview:   IMO Update 10/11       Hypertrophy of breast 10/31/2006     Priority: Medium     Elevated prostate specific antigen (PSA) 09/19/2006     Priority: Medium     Controlled diabetes mellitus type II without complication (H) 08/25/2004     Priority: Medium     Overview:        Essential hypertension 03/28/2003     Priority: Medium     Overview:   Mentioned in chart  - Patient denies  IMO Update          Past Medical History:    Past Medical History:   Diagnosis Date     A-fib (H) 2/21/2020     Benign non-nodular prostatic hyperplasia with lower urinary tract symptoms 10/22/2008     CKD stage G3a/A2, GFR 45-59 and albumin creatinine " ratio  mg/g 4/25/2017     Controlled diabetes mellitus type II without complication (H) 8/25/2004     Elevated prostate specific antigen (PSA) 9/19/2006     Essential hypertension 3/28/2003     Hyperlipidemia 12/1/2006     Hypertrophy of breast 10/31/2006     Vitamin D deficiency 12/3/2013       Past Surgical History:    Past Surgical History:   Procedure Laterality Date     CARPAL TUNNEL RELEASE RT/LT  01/01/2010     COLONOSCOPY - HIM SCAN  12/06/2006    Colonoscopy, flex, diagnostic     HERNIA REPAIR Left     groin      JOINT REPLACEMTN, KNEE RT/LT Bilateral     one was in 1992 the other was in 2000. unsure which was which.       Family History:    Family History   Problem Relation Age of Onset     Diabetes Mother      Cancer Mother        Social History:  Marital Status:   [2]  Social History     Tobacco Use     Smoking status: Never Smoker     Smokeless tobacco: Never Used   Substance Use Topics     Alcohol use: Yes     Comment: beer occasionally     Drug use: No        Medications:    aspirin 81 MG tablet  Cholecalciferol (VITAMIN D) 2000 UNITS CAPS  cyanocobalamin (VITAMIN B-12) 1000 MCG tablet  finasteride (PROSCAR) 5 MG tablet  lisinopril (ZESTRIL) 20 MG tablet  metFORMIN (GLUCOPHAGE-XR) 500 MG 24 hr tablet  metoprolol succinate ER (TOPROL-XL) 25 MG 24 hr tablet  amoxicillin (AMOXIL) 500 MG capsule  blood glucose (NO BRAND SPECIFIED) test strip  blood glucose calibration (NO BRAND SPECIFIED) solution  blood glucose monitoring (ACCU-CHEK KAYLEE PLUS) meter device kit  blood glucose monitoring (SOFTCLIX) lancets  ibuprofen (ADVIL) 200 MG capsule          Review of Systems   Constitutional: Negative for fever.   HENT: Negative for congestion.    Eyes: Negative for redness.   Respiratory: Negative for shortness of breath.    Cardiovascular: Negative for chest pain.   Gastrointestinal: Negative for abdominal pain.   Genitourinary: Positive for hematuria. Negative for difficulty urinating.  "  Musculoskeletal: Negative for arthralgias and neck stiffness.   Skin: Negative for color change.   Neurological: Negative for headaches.   Psychiatric/Behavioral: Negative for confusion.       Physical Exam   BP: (!) 144/72  Pulse: 69  Temp: 96.6  F (35.9  C)  Resp: 16  Height: 177.8 cm (5' 10\")  Weight: 85.4 kg (188 lb 4.4 oz)  SpO2: 100 %      Physical Exam  Vitals signs and nursing note reviewed.   Neck:      Musculoskeletal: Normal range of motion.   Cardiovascular:      Rate and Rhythm: Normal rate. Rhythm irregular.   Pulmonary:      Effort: Pulmonary effort is normal.   Abdominal:      Palpations: Abdomen is soft.      Tenderness: There is no right CVA tenderness or left CVA tenderness.   Neurological:      Mental Status: He is alert.         ED Course        Procedures    Results for orders placed or performed during the hospital encounter of 06/28/21 (from the past 24 hour(s))   UA reflex to Microscopic and Culture    Specimen: Midstream Urine   Result Value Ref Range    Color Urine Red     Appearance Urine Cloudy     Glucose Urine Negative NEG^Negative mg/dL    Bilirubin Urine Negative NEG^Negative    Ketones Urine Negative NEG^Negative mg/dL    Specific Gravity Urine 1.025 1.003 - 1.035    Blood Urine LARGE (A) NEG^Negative    pH Urine 6.0 5.0 - 7.0 pH    Protein Albumin Urine 100 (A) NEG^Negative mg/dL    Urobilinogen Urine 0.2 0.2 - 1.0 EU/dL    Nitrite Urine Negative NEG^Negative    Leukocyte Esterase Urine Trace (A) NEG^Negative    Source Midstream Urine    Urine Microscopic   Result Value Ref Range    WBC Urine >100 (A) OTO5^0 - 5 /HPF    RBC Urine >100 (A) OTO2^O - 2 /HPF   CBC with platelets differential   Result Value Ref Range    WBC 6.3 4.0 - 11.0 10e9/L    RBC Count 3.97 (L) 4.4 - 5.9 10e12/L    Hemoglobin 12.5 (L) 13.3 - 17.7 g/dL    Hematocrit 37.0 (L) 40.0 - 53.0 %    MCV 93 78 - 100 fl    MCH 31.5 26.5 - 33.0 pg    MCHC 33.8 31.5 - 36.5 g/dL    RDW 13.2 10.0 - 15.0 %    Platelet Count 153 " 150 - 450 10e9/L    Diff Method Automated Method     % Neutrophils 58.3 %    % Lymphocytes 19.0 %    % Monocytes 9.2 %    % Eosinophils 12.0 %    % Basophils 1.0 %    % Immature Granulocytes 0.5 %    Absolute Neutrophil 3.7 1.6 - 8.3 10e9/L    Absolute Lymphocytes 1.2 0.8 - 5.3 10e9/L    Absolute Monocytes 0.6 0.0 - 1.3 10e9/L    Absolute Eosinophils 0.8 (H) 0.0 - 0.7 10e9/L    Absolute Basophils 0.1 0.0 - 0.2 10e9/L    Abs Immature Granulocytes 0.0 0 - 0.4 10e9/L       Medications - No data to display    Assessments & Plan (with Medical Decision Making)     I have reviewed the nursing notes.    I have reviewed the findings, diagnosis, plan and need for follow up with the patient.  Patient feeling better about the situation, no recurrent hematuria in the ED.  Work-up for painless hematuria includes urogram and urology consultation.  Both of those were placed.  Culture is pending, low clinical suspicion for occult infection at this time.  Return to ED with fever, worsening pain burning with urination or continuous bleeding.  Patient and family verbalized understanding plan agreement left ED in improving condition.    Discharge Medication List as of 6/28/2021 10:30 AM          Final diagnoses:   Painless hematuria       6/28/2021   [facility] AND [facility]     David Garrison MD  06/28/21 1141

## 2021-06-28 NOTE — ED TRIAGE NOTES
"ED Nursing Triage Note (General)   ________________________________    Jeronimo Payton is a 89 year old Male that presents to triage private car  With history of hematuria this AM, \"It was pretty red.\" pt denies having pain or difficulty voiding, reported by patient   Significant symptoms had onset today.  BP (!) 144/72   Pulse 69   Temp 96.6  F (35.9  C) (Tympanic)   Resp 16   Ht 1.778 m (5' 10\")   Wt 85.4 kg (188 lb 4.4 oz)   SpO2 100%   BMI 27.01 kg/m  t  Patient appears alert  and oriented, in no acute distress., and cooperative and pleasant behavior.    GCS Total = 15  Airway: intact  Breathing noted as Normal  Circulation Normal  Skin:  Normal  Action taken:  Triage to critical care immediately      PRE HOSPITAL PRIOR LIVING SITUATION   " BUE grossly 3+/5

## 2021-06-30 LAB
BACTERIA SPEC CULT: NORMAL
SPECIMEN SOURCE: NORMAL

## 2021-06-30 NOTE — RESULT ENCOUNTER NOTE
Final urine culture report is negative.  Adult Negative Urine culture parameters per protocol: Any # Urogenital single or mixed organism, <10,000 col/ml single organism (cath specimen), and <50,000 col/ml single organism (midstream or cath specimen).  [facility] Emergency Dept discharge antibiotic prescribed (If applicable): None  Treatment recommendations per [facility] ED Lab Result Urine Culture protocol.

## 2021-07-21 ENCOUNTER — LAB (OUTPATIENT)
Dept: LAB | Facility: OTHER | Age: 86
End: 2021-07-21
Attending: FAMILY MEDICINE
Payer: MEDICARE

## 2021-07-21 ENCOUNTER — OFFICE VISIT (OUTPATIENT)
Dept: UROLOGY | Facility: OTHER | Age: 86
End: 2021-07-21
Attending: FAMILY MEDICINE
Payer: MEDICARE

## 2021-07-21 ENCOUNTER — HOSPITAL ENCOUNTER (OUTPATIENT)
Dept: CT IMAGING | Facility: OTHER | Age: 86
End: 2021-07-21
Attending: FAMILY MEDICINE
Payer: MEDICARE

## 2021-07-21 VITALS — SYSTOLIC BLOOD PRESSURE: 114 MMHG | HEART RATE: 58 BPM | RESPIRATION RATE: 18 BRPM | DIASTOLIC BLOOD PRESSURE: 70 MMHG

## 2021-07-21 DIAGNOSIS — R31.9 PAINLESS HEMATURIA: ICD-10-CM

## 2021-07-21 DIAGNOSIS — R31.0 GROSS HEMATURIA: Primary | ICD-10-CM

## 2021-07-21 DIAGNOSIS — Z01.812 PRE-PROCEDURE LAB EXAM: ICD-10-CM

## 2021-07-21 DIAGNOSIS — R31.9 HEMATURIA OF UNKNOWN CAUSE: ICD-10-CM

## 2021-07-21 LAB
CREAT SERPL-MCNC: 1.17 MG/DL (ref 0.7–1.3)
GFR SERPL CREATININE-BSD FRML MDRD: 53 ML/MIN/1.73M2

## 2021-07-21 PROCEDURE — 82565 ASSAY OF CREATININE: CPT | Mod: ZL

## 2021-07-21 PROCEDURE — G0463 HOSPITAL OUTPT CLINIC VISIT: HCPCS

## 2021-07-21 PROCEDURE — 99204 OFFICE O/P NEW MOD 45 MIN: CPT | Mod: 25 | Performed by: UROLOGY

## 2021-07-21 PROCEDURE — 74178 CT ABD&PLV WO CNTR FLWD CNTR: CPT | Mod: ME

## 2021-07-21 PROCEDURE — 36415 COLL VENOUS BLD VENIPUNCTURE: CPT | Mod: ZL

## 2021-07-21 PROCEDURE — 52000 CYSTOURETHROSCOPY: CPT | Performed by: UROLOGY

## 2021-07-21 PROCEDURE — 250N000011 HC RX IP 250 OP 636: Performed by: FAMILY MEDICINE

## 2021-07-21 PROCEDURE — G0463 HOSPITAL OUTPT CLINIC VISIT: HCPCS | Mod: 25

## 2021-07-21 RX ORDER — IOPAMIDOL 755 MG/ML
108 INJECTION, SOLUTION INTRAVASCULAR ONCE
Status: COMPLETED | OUTPATIENT
Start: 2021-07-21 | End: 2021-07-21

## 2021-07-21 RX ADMIN — IOPAMIDOL 108 ML: 755 INJECTION, SOLUTION INTRAVENOUS at 12:56

## 2021-07-21 ASSESSMENT — PAIN SCALES - GENERAL: PAINLEVEL: NO PAIN (0)

## 2021-07-21 NOTE — PROGRESS NOTES
Type of Visit  NPV    Chief Complaint  Hematuria    HPI  Mr. Payton is a 89 year old male who presents with hematuria.  Gross hematuria started 3 weeks ago.  Episode lasted about 2-3 days and then resolved spontaneously.  Patient denies associated dysuria at the time of onset.  Patient denies clots associated with hematuria.    Hematuria-related signs/symptoms  History of smoking?    No  History of chemotherapy?   No  History of pelvic radiation?   No  History of kidney or bladder stones?  No  History of frequent urinary tract infections? 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    Medications  He has a current medication list which includes the following prescription(s): amoxicillin, aspirin, blood glucose, blood glucose calibration, blood glucose monitoring, blood glucose monitoring, vitamin d, cyanocobalamin,  finasteride, ibuprofen, lisinopril, metformin, and metoprolol succinate er.    Allergies  Allergies   Allergen Reactions     Alatrofloxacin      No Clinical Screening - See Comments      Some kind of anesthetic       Social History  He  reports that he has never smoked. He has never used smokeless tobacco. He reports current alcohol use. He reports that he does not use drugs.  No drug abuse.    Family History  Family History   Problem Relation Age of Onset     Diabetes Mother      Cancer Mother        Review of Systems  I personally reviewed the ROS with the patient.    Nursing Notes:   Mónica Penaloza LPN  7/21/2021 12:49 PM  Addendum  Chief Complaint   Patient presents with     Consult     painless hematuria     Patient presents to the clinic today for a consult for painless hematuria.    Review of Systems:    Weight loss:    No     Recent fever/chills:  No   Night sweats:   No  Current skin rash:  No   Recent hair loss:  No  Heat intolerance:  No   Cold intolerance:  No  Chest pain:   No   Palpitations:   No  Shortness of breath:  No   Wheezing:   No  Constipation:    No   Diarrhea:   Yes   Nausea:   No   Vomiting:   No   Kidney/side pain:  No   Back pain:   No  Frequent headaches:  No   Dizziness:     No  Leg swelling:   No   Calf pain:    No    Parents, brothers or sisters with history of kidney cancer:   No  Parents, brothers or sisters with history of bladder cancer: No     Medication Reconciliation: completed   Mónica Penaloza LPN  7/21/2021 12:49 PM     Ania Thibodeaux LPN  7/21/2021  1:46 PM  Signed  Patient positioned in supine position, perineum area prepped with chlorhexidene Gluconate and patient draped per sterile technique. Per verbal order read back by Cristo Codrero MD, Urojet 10mL 2% lidocaine jelly to be instilled into urethra.  Urojet- 10ml 2% Lidocaine jelly instilled into the urethra.    Urojet 2%  Lot#: [id number]  Expiration date: 1/2023  : [employer]  NDC: [address]-1    [place]  Protocol    A. Pre-procedure verification complete Yes  1-relevant information / documentation available, reviewed and properly matched to the patient; 2-consent accurate and complete, 3-equipment and supplies available    B. Site marking complete N/A  Site marked if not in continuous attendance with patient    C. TIME OUT completed Yes  Time Out was conducted just prior to starting procedure to verify the eight required elements: 1-patient identity, 2-consent accurate and complete, 3-position, 4-correct side/site marked (if applicable), 5-procedure, 6-relevant images / results properly labeled and displayed (if applicable), 7-antibiotics / irrigation fluids (if applicable), 8-safety precautions.    After procedure perineum area rinsed. Discharge instructions reviewed with patient. Patient verbalized understanding of discharge instructions and discharged ambulatory.  Ania Thibodeaux LPN..................7/21/2021  1:43 PM        Physical Exam  Vitals:    07/21/21 1318   BP: 114/70   BP Location: Right arm   Patient Position: Sitting   Cuff Size: Adult Regular   Pulse: 58   Resp: 18     Constitutional: No acute distress.  Alert and cooperative   Head: NCAT  Eyes: Conjunctivae normal  Cardiovascular: Regular rate.  Pulmonary/Chest: Respirations are even and non-labored bilaterally, no audible wheezing  Abdominal: Soft. No distension, tenderness, masses or guarding.   Neurological: A + O x 3.  Cranial Nerves II-XII grossly intact.  Extremities: JOSSELYN x 4, Warm. No clubbing.  No cyanosis.    Skin: Pink, warm and dry.  No visible rashes noted.  Psychiatric:  Normal mood and affect  Back:  No left CVA tenderness.  No right CVA tenderness.  Genitourinary:  Nonpalpable bladder    ^^^^^^^^^^^^^^^^^^^^^^^^^^^^^^^^^^^^^^^^^^^^^^^^^^^^^^^^^^^^^^^^^^^^^^^^^^^^^^^^  Preprocedure diagnosis  Hematuria    Postprocedure diagnosis  Hematuria    Procedure  Flexible Cystourethroscopy    Surgeon  Cristo Cordero MD    Anesthesia  2%  lidocaine jelly intraurethrally    Complications  None    Indications  89 year old male undergoing a flexible cystoscopy for the above mentioned indications.    Findings  Cystoscopic findings included a normal anterior urethra.    Prostate demonstrated significant regrowth following an apparent TURP.  The bladder appeared to be normal capacity.    There were no tumors, stones or foreign bodies.    The orifices were slit-shaped and in their normal location.    Procedure  The patient was placed in supine position and prepped and draped in sterile fashion with lidocaine jelly per urethra for anesthesia.    I passed a lubricated 14F flexible cystoscope through the penile urethra and into the bladder and the bladder was completely visualized.  The cystoscope was retroflexed and the bladder neck and prostate visualized.    The cystoscope was slowly withdrawn while visualizing the urethra and the procedure terminated.    The patient tolerated the procedure well.      ^^^^^^^^^^^^^^^^^^^^^^^^^^^^^^^^^^^^^^^^^^^^^^^^^^^^^^^^^^^^^^^^^^^^^^^^^^^^^^^^    Labs  Results for SHAGGY GRANGER (MRN [id number]) as of 7/21/2021 12:46   6/28/2021 09:37 6/28/2021 09:50   WBC  6.3   Hemoglobin  12.5 (L)   Hematocrit  37.0 (L)   Platelet Count  153   RBC Count  3.97 (L)   MCV  93   MCH  31.5   MCHC  33.8   RDW  13.2   Diff Method  Automated Method   % Neutrophils  58.3   % Lymphocytes  19.0   % Monocytes  9.2   % Eosinophils  12.0   % Basophils  1.0   % Immature Granulocytes  0.5   Absolute Neutrophil  3.7   Absolute Lymphocytes  1.2   Absolute Monocytes  0.6   Absolute Eosinophils  0.8 (H)   Absolute Basophils  0.1   Abs Immature Granulocytes  0.0   Color Urine Red    Appearance Urine Cloudy    Glucose Urine Negative    Bilirubin Urine Negative    Ketones Urine Negative    Specific Gravity Urine 1.025    pH Urine 6.0    Protein Albumin Urine 100 (A)    Urobilinogen Urine 0.2    Nitrite Urine Negative    Blood Urine LARGE (A)     Leukocyte Esterase Urine Trace (A)    Source Midstream Urine    WBC Urine >100 (A)    RBC Urine >100 (A)    Culture Micro 10,000 to 50,000 ...    Specimen Description Midstream Urine      Imaging  CT a/p   7/21/2021  I personally reviewed and interpreted the images and report.  CT urogram was negative other than right-sided simple cysts of the kidney.    Report is pending at this time**    Assessment & Plan  Mr. Payton is a 89 year old male with hematuria who presents to undergo cystoscopy to complete his hematuria work up today which was negative.    Discussed rationale for work up.  Discussed the specific indications for cross-sectional imaging as well as diagnostic cystoscopy.  Discussed potential findings of hematuria work up including, but not limited to, kidney stones, bladder tumors and/or kidney tumors.  Also discussed the potential for a normal work up.    Provided reassurance  Follow up as needed

## 2021-07-21 NOTE — NURSING NOTE
Chief Complaint   Patient presents with     Consult     painless hematuria     Patient presents to the clinic today for a consult for painless hematuria.    Review of Systems:    Weight loss:    No     Recent fever/chills:  No   Night sweats:   No  Current skin rash:  No   Recent hair loss:  No  Heat intolerance:  No   Cold intolerance:  No  Chest pain:   No   Palpitations:   No  Shortness of breath:  No   Wheezing:   No  Constipation:    No   Diarrhea:   Yes   Nausea:   No   Vomiting:   No   Kidney/side pain:  No   Back pain:   No  Frequent headaches:  No   Dizziness:     No  Leg swelling:   No   Calf pain:    No    Parents, brothers or sisters with history of kidney cancer:   No  Parents, brothers or sisters with history of bladder cancer: No     Medication Reconciliation: completed   Mónica Penaloza LPN  7/21/2021 12:49 PM

## 2021-07-21 NOTE — PATIENT INSTRUCTIONS

## 2021-07-21 NOTE — NURSING NOTE
Patient positioned in supine position, perineum area prepped with chlorhexidene Gluconate and patient draped per sterile technique. Per verbal order read back by Cristo Cordero MD, Urojet 10mL 2% lidocaine jelly to be instilled into urethra.  Urojet- 10ml 2% Lidocaine jelly instilled into the urethra.    Urojet 2%  Lot#: [id number]  Expiration date: 1/2023  : [employer]  NDC: [address]-5    [place] Protocol    A. Pre-procedure verification complete Yes  1-relevant information / documentation available, reviewed and properly matched to the patient; 2-consent accurate and complete, 3-equipment and supplies available    B. Site marking complete N/A  Site marked if not in continuous attendance with patient    C. TIME OUT completed Yes  Time Out was conducted just prior to starting procedure to verify the eight required elements: 1-patient identity, 2-consent accurate and complete, 3-position, 4-correct side/site marked (if applicable), 5-procedure, 6-relevant images / results properly labeled and displayed (if applicable), 7-antibiotics / irrigation fluids (if applicable), 8-safety precautions.    After procedure perineum area rinsed. Discharge instructions reviewed with patient. Patient verbalized understanding of discharge instructions and discharged ambulatory.  Ania Thibodeaux LPN..................7/21/2021  1:43 PM

## 2021-07-28 DIAGNOSIS — R52 PAIN: Primary | ICD-10-CM

## 2021-07-28 DIAGNOSIS — Z96.653 HISTORY OF KNEE REPLACEMENT, TOTAL, BILATERAL: ICD-10-CM

## 2021-07-28 DIAGNOSIS — E11.9 CONTROLLED TYPE 2 DIABETES MELLITUS WITHOUT COMPLICATION, UNSPECIFIED WHETHER LONG TERM INSULIN USE (H): ICD-10-CM

## 2021-07-28 DIAGNOSIS — I48.91 ATRIAL FIBRILLATION WITH RVR (H): ICD-10-CM

## 2021-07-28 RX ORDER — METOPROLOL SUCCINATE 25 MG/1
TABLET, EXTENDED RELEASE ORAL
Qty: 45 TABLET | Refills: 3 | Status: SHIPPED | OUTPATIENT
Start: 2021-07-28

## 2021-07-28 RX ORDER — AMOXICILLIN 500 MG/1
500 CAPSULE ORAL ONCE
Qty: 1 CAPSULE | Refills: 1 | Status: SHIPPED | OUTPATIENT
Start: 2021-07-28 | End: 2021-07-28

## 2021-07-28 RX ORDER — ASPIRIN 81 MG/1
81 TABLET, CHEWABLE ORAL DAILY
Qty: 90 TABLET | Refills: 3 | Status: SHIPPED | OUTPATIENT
Start: 2021-07-28 | End: 2022-01-01

## 2021-07-28 RX ORDER — LANCETS
EACH MISCELLANEOUS
Qty: 100 EACH | Refills: 0 | Status: SHIPPED | OUTPATIENT
Start: 2021-07-28

## 2021-07-28 RX ORDER — OMEGA-3 FATTY ACIDS/FISH OIL 300-1000MG
200 CAPSULE ORAL EVERY 6 HOURS PRN
Qty: 90 CAPSULE | Refills: 3 | Status: SHIPPED | OUTPATIENT
Start: 2021-07-28

## 2021-07-28 RX ORDER — BLOOD-GLUCOSE CONTROL, NORMAL
EACH MISCELLANEOUS
Qty: 1 EACH | Refills: 3 | Status: SHIPPED | OUTPATIENT
Start: 2021-07-28

## 2021-07-28 RX ORDER — MULTIVIT-MIN/IRON/FOLIC ACID/K 18-600-40
1 CAPSULE ORAL DAILY
Qty: 90 CAPSULE | Refills: 3 | Status: SHIPPED | OUTPATIENT
Start: 2021-07-28

## 2021-07-28 RX ORDER — LANOLIN ALCOHOL/MO/W.PET/CERES
1000 CREAM (GRAM) TOPICAL DAILY
Qty: 90 TABLET | Refills: 3 | Status: SHIPPED | OUTPATIENT
Start: 2021-07-28

## 2021-07-28 NOTE — PROGRESS NOTES
Fax from [facility] requesting refills on pending medications.     YARON Morfin

## 2021-08-02 ENCOUNTER — TELEPHONE (OUTPATIENT)
Dept: FAMILY MEDICINE | Facility: OTHER | Age: 86
End: 2021-08-02

## 2021-08-02 NOTE — TELEPHONE ENCOUNTER
Staff from [facility] calls and states that since they started med set up 1 1/2 weeks ago he has had diarrhea.  Pt's daughter is wondering if it is from his metformin?

## 2021-08-03 ENCOUNTER — TELEPHONE (OUTPATIENT)
Dept: FAMILY MEDICINE | Facility: OTHER | Age: 86
End: 2021-08-03

## 2021-08-03 DIAGNOSIS — I48.91 ATRIAL FIBRILLATION WITH RVR (H): ICD-10-CM

## 2021-08-03 DIAGNOSIS — E11.9 CONTROLLED TYPE 2 DIABETES MELLITUS WITHOUT COMPLICATION, UNSPECIFIED WHETHER LONG TERM INSULIN USE (H): ICD-10-CM

## 2021-08-03 RX ORDER — METOPROLOL SUCCINATE 25 MG/1
TABLET, EXTENDED RELEASE ORAL
Qty: 45 TABLET | Refills: 0 | OUTPATIENT
Start: 2021-08-03

## 2021-08-03 RX ORDER — BLOOD-GLUCOSE METER
EACH MISCELLANEOUS
Qty: 1 KIT | Refills: 0 | Status: SHIPPED | OUTPATIENT
Start: 2021-08-03

## 2021-08-03 NOTE — TELEPHONE ENCOUNTER
[facility] calling and states they are needing BG monitoring device and orders for blood glucose checks. BG device pended in separate encounter. Please fax orders to [phone] ATTN: Myra.  Thank you.

## 2021-08-06 ENCOUNTER — TELEPHONE (OUTPATIENT)
Dept: FAMILY MEDICINE | Facility: OTHER | Age: 86
End: 2021-08-06

## 2021-08-06 DIAGNOSIS — I48.91 ATRIAL FIBRILLATION WITH RVR (H): ICD-10-CM

## 2021-08-06 NOTE — TELEPHONE ENCOUNTER
"Pt called from the nursing home. Seems confused. States that he has been using a cream for an \"intensive skin therapy\" that he is being treated for. States that there is a one year old girl that will be visiting him and he is concerned because he does not want her exposed to whatever he has. Pt is difficult to follow in conversation. No noted skin conditions in chart. Pt was encouraged to speak with staff at nursing home if he had concerns.   "

## 2021-08-09 RX ORDER — METOPROLOL SUCCINATE 25 MG/1
TABLET, EXTENDED RELEASE ORAL
Qty: 45 TABLET | Refills: 0 | OUTPATIENT
Start: 2021-08-09

## 2021-10-22 NOTE — PROGRESS NOTES
SUBJECTIVE:   Jeronimo Payton is a 89 year old male who presents for Preventive Visit.      Patient has been advised of split billing requirements and indicates understanding: Yes   Are you in the first 12 months of your Medicare coverage?  No    HPI  Do you feel safe in your environment? Yes    Have you ever done Advance Care Planning? (For example, a Health Directive, POLST, or a discussion with a medical provider or your loved ones about your wishes): Yes, advance care planning is on file.       Fall risk  Fallen 2 or more times in the past year?: Yes  Any fall with injury in the past year?: No    Cognitive Screening   1) Repeat 3 items (Leader, Season, Table)    2) Clock draw: ABNORMAL    3) 3 item recall: Recalls NO objects   Results: 0 items recalled: PROBABLE COGNITIVE IMPAIRMENT, **INFORM PROVIDER**    Mini-CogTM Copyright DEISI Pereira. Licensed by the author for use in [facility] [employer]; reprinted with permission (mary [email or web address]). All rights reserved.      Do you have sleep apnea, excessive snoring or daytime drowsiness?: no    Reviewed and updated as needed this visit by clinical staff  Tobacco  Allergies  Meds   Med Hx  Surg Hx  Fam Hx  Soc Hx        Reviewed and updated as needed this visit by Provider                Social History     Tobacco Use     Smoking status: Never Smoker     Smokeless tobacco: Never Used   Substance Use Topics     Alcohol use: Yes     Comment: beer occasionally         No flowsheet data found.        Wife  in July. Is now in [place].  Would like to reduce meds, GI side effects form metformin.      Recent Labs   Lab Test 21  1104 20  1131 20  0419 20  0419 20  1324 20  1324 20  0855 20  0855 19  1420 19  1420 19  0000 19  0000   A1C  --  6.7*  --   --   --   --   --  6.6*  --  6.6*   < > 6.5*   LDL  --  100*  --   --   --   --   --   --   --   --   --  113   HDL  --  67  --   --   --   --   --   --    --   --   --  55   TRIG  --  101  --   --   --   --   --   --   --   --   --  105   ALT  --  22  --   --   --  39  --   --   --   --   --  16   CR 1.17 1.02   < > 1.07   < > 1.23   < > 1.34*   < > 1.00   < > 1.15   GFRESTIMATED 53* 63   < > 60*   < > 50*   < > 45*   < > 65   < > 60*   GFRESTBLACK  --  73  --  69   < > 58*   < > 53*   < > 76  --   --    POTASSIUM  --  4.7  --  4.2   < > 4.2   < > 4.2   < > 4.1   < > 4.2   TSH  --   --   --   --   --   --   --   --   --  2.44  --   --     < > = values in this interval not displayed.              Current providers sharing in care for this patient include:   Patient Care Team:  Gene Atwood MD as PCP - General (Family Medicine)  Eloy Conde MD as Assigned PCP  Cristo Cordero MD as Assigned Surgical Provider    The following health maintenance items are reviewed in Epic and correct as of today:  Health Maintenance Due   Topic Date Due     ZOSTER IMMUNIZATION (1 of 2) Never done     A1C  12/09/2020     EYE EXAM  05/27/2021     BMP  09/09/2021     LIPID  09/09/2021     MICROALBUMIN  09/09/2021     DIABETIC FOOT EXAM  09/09/2021     Lab work is in process  Labs reviewed in [facility]  Current Outpatient Medications   Medication Sig Dispense Refill     aspirin (ASA) 81 MG chewable tablet Take 1 tablet (81 mg) by mouth daily 90 tablet 3     blood glucose (NO BRAND SPECIFIED) test strip Use to test blood sugar 1 times daily- Cecilia test strips 100 strip 1     blood glucose calibration (NO BRAND SPECIFIED) solution Use to calibrate blood glucose monitor as needed as directed. 1 each 3     blood glucose monitoring (ACCU-CHEK CECILIA PLUS) meter device kit Use to test blood sugar 1 times daily 1 kit 0     blood glucose monitoring (SOFTCLIX) lancets Use to test blood sugar 1 times daily 100 each 0     Cholecalciferol (VITAMIN D) 50 MCG (2000 UT) CAPS Take 1 capsule by mouth daily 90 capsule 3     cyanocobalamin (VITAMIN B-12) 1000 MCG tablet Take 1 tablet (1,000 mcg) by mouth  "daily 90 tablet 3     finasteride (PROSCAR) 5 MG tablet TAKE 1 TABLET BY MOUTH DAILY 90 tablet 3     ibuprofen (ADVIL) 200 MG capsule Take 1 capsule (200 mg) by mouth every 6 hours as needed for fever 90 capsule 3     lisinopril (ZESTRIL) 20 MG tablet TAKE 1 TABLET BY MOUTH DAILY 90 tablet 3     metoprolol succinate ER (TOPROL-XL) 25 MG 24 hr tablet TAKE ONE-HALF TABLET (12.5MG) BY MOUTH DAILY 45 tablet 3     Allergies   Allergen Reactions     Alatrofloxacin      No Clinical Screening - See Comments      Some kind of anesthetic             Review of Systems  Knee pain in addition to memory problems.  Constitutional, HEENT, cardiovascular, pulmonary, GI, , musculoskeletal, neuro, skin, endocrine and psych systems are negative, except as otherwise noted.    OBJECTIVE:   /68 (BP Location: Right arm, Patient Position: Sitting, Cuff Size: Adult Regular)   Pulse 59   Temp 97.3  F (36.3  C) (Tympanic)   Resp 16   Ht 1.746 m (5' 8.75\")   Wt 81.6 kg (180 lb)   SpO2 100%   BMI 26.78 kg/m   Estimated body mass index is 26.78 kg/m  as calculated from the following:    Height as of this encounter: 1.746 m (5' 8.75\").    Weight as of this encounter: 81.6 kg (180 lb).  Physical Exam  GENERAL: healthy, alert and no distress  EYES: Eyes grossly normal to inspection, PERRL and conjunctivae and sclerae normal  HENT: ear canals and TM's normal, nose and mouth without ulcers or lesions  NECK: no adenopathy, no asymmetry, masses, or scars and thyroid normal to palpation  RESP: lungs clear to auscultation - no rales, rhonchi or wheezes  CV: regular rate and rhythm, normal S1 S2, no S3 or S4, no murmur, click or rub, no peripheral edema and peripheral pulses strong  ABDOMEN: soft, nontender, no hepatosplenomegaly, no masses and bowel sounds normal  MS: no gross musculoskeletal defects noted, no edema  SKIN: no suspicious lesions or rashes  NEURO: speech is normal, speech patterns with mild confabulation, poor recent memory.  " Good long term.      Diagnostic Test Results:  Labs reviewed in Epic  Results for orders placed or performed in visit on 10/22/21   Lipid Panel     Status: Abnormal   Result Value Ref Range    Cholesterol 187 <200 mg/dL    Triglycerides 119 <150 mg/dL    Direct Measure HDL 51 23 - 92 mg/dL    LDL Cholesterol Calculated 112 (H) <=100 mg/dL    Non HDL Cholesterol 136 (H) <130 mg/dL    Patient Fasting > 8hrs? Unknown     Narrative    Cholesterol  Desirable:  <200 mg/dL    Triglycerides  Normal:  Less than 150 mg/dL  Borderline High:  150-199 mg/dL  High:  200-499 mg/dL  Very High:  Greater than or equal to 500 mg/dL    Direct Measure HDL  Female:  Greater than or equal to 50 mg/dL   Male:  Greater than or equal to 40 mg/dL    LDL Cholesterol  Desirable:  <100mg/dL  Above Desirable:  100-129 mg/dL   Borderline High:  130-159 mg/dL   High:  160-189 mg/dL   Very High:  >= 190 mg/dL    Non HDL Cholesterol  Desirable:  130 mg/dL  Above Desirable:  130-159 mg/dL  Borderline High:  160-189 mg/dL  High:  190-219 mg/dL  Very High:  Greater than or equal to 220 mg/dL   Basic Metabolic Panel     Status: Abnormal   Result Value Ref Range    Sodium 139 134 - 144 mmol/L    Potassium 4.3 3.5 - 5.1 mmol/L    Chloride 105 98 - 107 mmol/L    Carbon Dioxide (CO2) 27 21 - 31 mmol/L    Anion Gap 7 3 - 14 mmol/L    Urea Nitrogen 27 (H) 7 - 25 mg/dL    Creatinine 1.05 0.70 - 1.30 mg/dL    Calcium 9.7 8.6 - 10.3 mg/dL    Glucose 138 (H) 70 - 105 mg/dL    GFR Estimate 60 (L) >60 mL/min/1.73m2   Hemoglobin A1c     Status: Abnormal   Result Value Ref Range    Hemoglobin A1C 7.1 (H) 4.0 - 6.2 %         ASSESSMENT / PLAN:       ICD-10-CM    1. Encounter for Medicare annual wellness exam  Z00.00    2. Controlled type 2 diabetes mellitus without complication, without long-term current use of insulin (H)  E11.9 Hemoglobin A1c     Basic Metabolic Panel     Lipid Panel     Albumin Random Urine Quantitative with Creat Ratio   3. 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  Put numbers in a square, and hands outside of the center.  Will pivot cares towards comfort more than longevity and stop the metformin, has side effects anyway.     Patient has been advised of split billing requirements and indicates understanding: Yes  COUNSELING:  Reviewed preventive health counseling, as reflected in patient instructions       Healthy diet/nutrition       Fall risk prevention       Alcohol Use     Estimated body mass index is 26.78 kg/m  as calculated from the following:    Height as of this encounter: 1.746 m (5' 8.75\").    Weight as of this encounter: 81.6 kg (180 lb).        He reports that he has never smoked. He has never used smokeless tobacco.      Appropriate preventive services were discussed with this patient, including applicable screening as appropriate for cardiovascular disease, diabetes, osteopenia/osteoporosis, and glaucoma.  As appropriate for age/gender, discussed screening for colorectal cancer, prostate cancer, breast cancer, and cervical cancer. Checklist reviewing preventive services available has been given to the patient.    Reviewed patients plan of care and provided an AVS. The Basic Care Plan (routine screening as documented in Health Maintenance) for Jeronimo meets the Care Plan requirement. This Care Plan has been established and reviewed with the Patient.    Counseling Resources:  ATP IV Guidelines  Pooled Cohorts Equation Calculator  Breast Cancer Risk Calculator  Breast Cancer: Medication to Reduce Risk  FRAX Risk Assessment  ICSI Preventive Guidelines  Dietary Guidelines for Americans, 2010  USDA's MyPlate  ASA Prophylaxis  Lung CA Screening    Gene Atwood MD  [facility]    Identified Health Risks:    He is at risk for falling and has been provided with information to reduce the risk of falling at home.  "

## 2021-10-22 NOTE — NURSING NOTE
"Chief Complaint   Patient presents with     Medicare Visit     Annaul well visit; establish care       Initial /68 (BP Location: Right arm, Patient Position: Sitting, Cuff Size: Adult Regular)   Pulse 59   Temp 97.3  F (36.3  C) (Tympanic)   Resp 16   Ht 1.746 m (5' 8.75\")   Wt 81.6 kg (180 lb)   SpO2 100%   BMI 26.78 kg/m   Estimated body mass index is 26.78 kg/m  as calculated from the following:    Height as of this encounter: 1.746 m (5' 8.75\").    Weight as of this encounter: 81.6 kg (180 lb).  Medication Reconciliation: complete  FOOD SECURITY SCREENING QUESTIONS  Hunger Vital Signs:  Within the past 12 months we worried whether our food would run out before we got money to buy more. Never  Within the past 12 months the food we bought just didn't last and we didn't have money to get more. Never    Advance care plan reviewed      Lisa Bright LPN    "

## 2021-10-22 NOTE — LETTER
October 25, 2021      Jeronimo LOVE  [id number]  [place] [address]        Dear ,    We are writing to inform you of your test results.    Your test results fall within the expected range(s) or remain unchanged from previous results.  Please continue with current treatment plan.    Resulted Orders   Lipid Panel   Result Value Ref Range    Cholesterol 187 <200 mg/dL    Triglycerides 119 <150 mg/dL    Direct Measure HDL 51 23 - 92 mg/dL    LDL Cholesterol Calculated 112 (H) <=100 mg/dL    Non HDL Cholesterol 136 (H) <130 mg/dL    Patient Fasting > 8hrs? Unknown     Narrative    Cholesterol  Desirable:  <200 mg/dL    Triglycerides  Normal:  Less than 150 mg/dL  Borderline High:  150-199 mg/dL  High:  200-499 mg/dL  Very High:  Greater than or equal to 500 mg/dL    Direct Measure HDL  Female:  Greater than or equal to 50 mg/dL   Male:  Greater than or equal to 40 mg/dL    LDL Cholesterol  Desirable:  <100mg/dL  Above Desirable:  100-129 mg/dL   Borderline High:  130-159 mg/dL   High:  160-189 mg/dL   Very High:  >= 190 mg/dL    Non HDL Cholesterol  Desirable:  130 mg/dL  Above Desirable:  130-159 mg/dL  Borderline High:  160-189 mg/dL  High:  190-219 mg/dL  Very High:  Greater than or equal to 220 mg/dL   Basic Metabolic Panel   Result Value Ref Range    Sodium 139 134 - 144 mmol/L    Potassium 4.3 3.5 - 5.1 mmol/L    Chloride 105 98 - 107 mmol/L    Carbon Dioxide (CO2) 27 21 - 31 mmol/L    Anion Gap 7 3 - 14 mmol/L    Urea Nitrogen 27 (H) 7 - 25 mg/dL    Creatinine 1.05 0.70 - 1.30 mg/dL    Calcium 9.7 8.6 - 10.3 mg/dL    Glucose 138 (H) 70 - 105 mg/dL    GFR Estimate 60 (L) >60 mL/min/1.73m2      Comment:      As of July 11, 2021, eGFR is calculated by the CKD-EPI creatinine equation, without race adjustment. eGFR can be influenced by muscle mass, exercise, and diet. The reported eGFR is an estimation only and is only applicable if the renal function is stable.    Hemoglobin A1c   Result Value Ref Range    Hemoglobin A1C 7.1 (H) 4.0 - 6.2 %   Albumin Random Urine Quantitative with Creat Ratio   Result Value Ref Range    Creatinine Urine mg/dL 120 mg/dL    Albumin Urine mg/L 109 mg/L    Albumin Urine mg/g Cr 90.83 (H) 0.00 - 17.00 mg/g Cr       If you have any questions or concerns, please call the clinic at the number listed above.       Sincerely,      Gene Atwood MD

## 2021-10-22 NOTE — PATIENT INSTRUCTIONS
Patient Education   Personalized Prevention Plan  You are due for the preventive services outlined below.  Your care team is available to assist you in scheduling these services.  If you have already completed any of these items, please share that information with your care team to update in your medical record.  Health Maintenance Due   Topic Date Due     Zoster (Shingles) Vaccine (1 of 2) Never done     A1C Lab  12/09/2020     Eye Exam  05/27/2021     Basic Metabolic Panel  09/09/2021     Cholesterol Lab  09/09/2021     Kidney Microalbumin Urine Test  09/09/2021     Diabetic Foot Exam  09/09/2021       Preventing Falls at Home  A person can fall for many reasons. Older adults may fall because reaction time slows down as we age. Your muscles and joints may get stiff, weak, or less flexible because of illness, medicines, or a physical condition.   Other health problems that make falls more likely include:     Arthritis    Dizziness or lightheadedness when you stand up (orthostatic hypotension)    History of a stroke    Dizziness    Anemia    Certain medicines taken for mental illness or to control blood pressure.    Problems with balance or gait    Bladder or urinary problems    History of falling    Changes in vision (vision impairment)    Changes in thinking skills and memory (cognitive impairment)  Injuries from a fall can include serious injuries such as broken bones, dislocated joints, internal bleeding and cuts. Injuries like these can limit your independence.   Prevention tips  To help prevent falls and fall-related injuries, follow the tips below.    Floors  To make floors safer:     Put nonskid pads under area rugs.    Remove small rugs.    Replace worn floor coverings.    Tack carpets firmly to each step on carpeted stairs. Put nonskid strips on the edges of uncarpeted stairs.    Keep floors and stairs free of clutter and cords.    Arrange furniture so there are clear pathways.    Clean up any spills  right away.  Bathrooms    To make bathrooms safer:     Install grab bars in the tub or shower.    Apply nonskid strips or put a nonskid rubber mat in the tub or shower.    Sit on a bath chair to bathe.    Use bathmats with nonskid backing.  Lighting  To improve visibility in your home:      Keep a flashlight in each room. Or put a lamp next to the bed within easy reach.    Put nightlights in the bedrooms, hallways, kitchen, and bathrooms.    Make sure all stairways have good lighting.    Take your time when going up and down stairs.    Put handrails on both sides of stairs and in walkways for more support. To prevent injury to your wrist or arm, don t use handrails to pull yourself up.    Install grab bars to pull yourself up.    Move or rearrange items that you use often. This will make them easier to find or reach.    Look at your home to find any safety hazards. Especially look at doorways, walkways, and the driveway. Remove or repair any safety problems that you find.  Other changes to make    Look around to find any safety hazards. Look closely at doorways, walkways, and the driveway. Remove or repair any safety problems that you find.    Wear shoes that fit well.    Take your time when going up and down stairs.    Put handrails on both sides of stairs and in walkways for more support. To prevent injury to your wrist or arm, don t use handrails to pull yourself up.    Install grab bars wherever needed to pull yourself up.    Arrange items that you use often. This will make them easier to find or reach.    [employer] last reviewed this educational content on 3/1/2020    2469-6894 [employer]. All rights reserved. This information is not intended as a substitute for professional medical care. Always follow your healthcare professional's instructions.

## 2021-10-22 NOTE — LETTER
October 22, 2021      Jeronimo LOVE  [id number]  [place] [address]        Dear ,    We are writing to inform you of your test results.    Your test results fall within the expected range(s) or remain unchanged from previous results.  Please continue with current treatment plan.    Resulted Orders   Lipid Panel   Result Value Ref Range    Cholesterol 187 <200 mg/dL    Triglycerides 119 <150 mg/dL    Direct Measure HDL 51 23 - 92 mg/dL    LDL Cholesterol Calculated 112 (H) <=100 mg/dL    Non HDL Cholesterol 136 (H) <130 mg/dL    Patient Fasting > 8hrs? Unknown     Narrative    Cholesterol  Desirable:  <200 mg/dL    Triglycerides  Normal:  Less than 150 mg/dL  Borderline High:  150-199 mg/dL  High:  200-499 mg/dL  Very High:  Greater than or equal to 500 mg/dL    Direct Measure HDL  Female:  Greater than or equal to 50 mg/dL   Male:  Greater than or equal to 40 mg/dL    LDL Cholesterol  Desirable:  <100mg/dL  Above Desirable:  100-129 mg/dL   Borderline High:  130-159 mg/dL   High:  160-189 mg/dL   Very High:  >= 190 mg/dL    Non HDL Cholesterol  Desirable:  130 mg/dL  Above Desirable:  130-159 mg/dL  Borderline High:  160-189 mg/dL  High:  190-219 mg/dL  Very High:  Greater than or equal to 220 mg/dL   Basic Metabolic Panel   Result Value Ref Range    Sodium 139 134 - 144 mmol/L    Potassium 4.3 3.5 - 5.1 mmol/L    Chloride 105 98 - 107 mmol/L    Carbon Dioxide (CO2) 27 21 - 31 mmol/L    Anion Gap 7 3 - 14 mmol/L    Urea Nitrogen 27 (H) 7 - 25 mg/dL    Creatinine 1.05 0.70 - 1.30 mg/dL    Calcium 9.7 8.6 - 10.3 mg/dL    Glucose 138 (H) 70 - 105 mg/dL    GFR Estimate 60 (L) >60 mL/min/1.73m2      Comment:      As of July 11, 2021, eGFR is calculated by the CKD-EPI creatinine equation, without race adjustment. eGFR can be influenced by muscle mass, exercise, and diet. The reported eGFR is an estimation only and is only applicable if the renal function is stable.    Hemoglobin A1c   Result Value Ref Range    Hemoglobin A1C 7.1 (H) 4.0 - 6.2 %       If you have any questions or concerns, please call the clinic at the number listed above.       Sincerely,      Gene Atwood MD

## 2022-01-01 ENCOUNTER — APPOINTMENT (OUTPATIENT)
Dept: OCCUPATIONAL THERAPY | Facility: OTHER | Age: 87
DRG: 284 | End: 2022-01-01
Attending: INTERNAL MEDICINE
Payer: MEDICARE

## 2022-01-01 ENCOUNTER — APPOINTMENT (OUTPATIENT)
Dept: CT IMAGING | Facility: OTHER | Age: 87
DRG: 284 | End: 2022-01-01
Attending: STUDENT IN AN ORGANIZED HEALTH CARE EDUCATION/TRAINING PROGRAM
Payer: MEDICARE

## 2022-01-01 ENCOUNTER — NURSING HOME VISIT (OUTPATIENT)
Dept: GERIATRICS | Facility: OTHER | Age: 87
End: 2022-01-01
Attending: NURSE PRACTITIONER
Payer: MEDICARE

## 2022-01-01 ENCOUNTER — LAB REQUISITION (OUTPATIENT)
Dept: LAB | Facility: OTHER | Age: 87
End: 2022-01-01
Payer: MEDICARE

## 2022-01-01 ENCOUNTER — DOCUMENTATION ONLY (OUTPATIENT)
Dept: OTHER | Facility: CLINIC | Age: 87
End: 2022-01-01
Payer: MEDICARE

## 2022-01-01 ENCOUNTER — TRANSFERRED RECORDS (OUTPATIENT)
Dept: HEALTH INFORMATION MANAGEMENT | Facility: OTHER | Age: 87
End: 2022-01-01
Payer: MEDICARE

## 2022-01-01 ENCOUNTER — HOSPITAL ENCOUNTER (INPATIENT)
Facility: OTHER | Age: 87
LOS: 2 days | DRG: 284 | End: 2022-08-17
Attending: STUDENT IN AN ORGANIZED HEALTH CARE EDUCATION/TRAINING PROGRAM | Admitting: INTERNAL MEDICINE
Payer: MEDICARE

## 2022-01-01 ENCOUNTER — APPOINTMENT (OUTPATIENT)
Dept: CARDIOLOGY | Facility: OTHER | Age: 87
DRG: 284 | End: 2022-01-01
Attending: INTERNAL MEDICINE
Payer: MEDICARE

## 2022-01-01 ENCOUNTER — APPOINTMENT (OUTPATIENT)
Dept: PHYSICAL THERAPY | Facility: OTHER | Age: 87
DRG: 284 | End: 2022-01-01
Attending: INTERNAL MEDICINE
Payer: MEDICARE

## 2022-01-01 VITALS
WEIGHT: 189.15 LBS | HEIGHT: 71 IN | HEART RATE: 142 BPM | SYSTOLIC BLOOD PRESSURE: 90 MMHG | OXYGEN SATURATION: 98 % | TEMPERATURE: 98 F | BODY MASS INDEX: 26.48 KG/M2 | DIASTOLIC BLOOD PRESSURE: 52 MMHG | RESPIRATION RATE: 48 BRPM

## 2022-01-01 VITALS — BODY MASS INDEX: 27.28 KG/M2 | WEIGHT: 183.4 LBS

## 2022-01-01 DIAGNOSIS — I10 ESSENTIAL (PRIMARY) HYPERTENSION: ICD-10-CM

## 2022-01-01 DIAGNOSIS — Z78.9 LIVING IN ASSISTED LIVING: ICD-10-CM

## 2022-01-01 DIAGNOSIS — E11.9 DM TYPE 2, NOT AT GOAL (H): Primary | ICD-10-CM

## 2022-01-01 DIAGNOSIS — I48.91 ATRIAL FIBRILLATION WITH RVR (H): ICD-10-CM

## 2022-01-01 DIAGNOSIS — R26.81 UNSTABLE GAIT: ICD-10-CM

## 2022-01-01 DIAGNOSIS — N17.9 ACUTE RENAL FAILURE, UNSPECIFIED ACUTE RENAL FAILURE TYPE (H): ICD-10-CM
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
## 2022-01-01 DIAGNOSIS — R41.89 COGNITIVE DECLINE: ICD-10-CM

## 2022-01-01 DIAGNOSIS — E11.9 CONTROLLED TYPE 2 DIABETES MELLITUS WITHOUT COMPLICATION, WITHOUT LONG-TERM CURRENT USE OF INSULIN (H): ICD-10-CM

## 2022-01-01 DIAGNOSIS — R53.81 DECLINING FUNCTIONAL STATUS: ICD-10-CM

## 2022-01-01 DIAGNOSIS — D64.9 ANEMIA, UNSPECIFIED: ICD-10-CM

## 2022-01-01 DIAGNOSIS — M62.81 GENERALIZED MUSCLE WEAKNESS: ICD-10-CM
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
## 2022-01-01 DIAGNOSIS — E11.9 TYPE 2 DIABETES MELLITUS WITHOUT COMPLICATIONS (H): ICD-10-CM

## 2022-01-01 DIAGNOSIS — Z86.79 HISTORY OF ATRIAL FIBRILLATION: ICD-10-CM

## 2022-01-01 DIAGNOSIS — I48.91 ATRIAL FIBRILLATION, UNSPECIFIED TYPE (H): ICD-10-CM

## 2022-01-01 DIAGNOSIS — Z98.1 ARTHRODESIS STATUS: ICD-10-CM

## 2022-01-01 DIAGNOSIS — Z74.1 REQUIRES ASSISTANCE WITH ACTIVITIES OF DAILY LIVING (ADL): ICD-10-CM

## 2022-01-01 DIAGNOSIS — F03.90 DEMENTIA WITHOUT BEHAVIORAL DISTURBANCE, UNSPECIFIED DEMENTIA TYPE: Primary | ICD-10-CM

## 2022-01-01 DIAGNOSIS — Z87.438: ICD-10-CM

## 2022-01-01 DIAGNOSIS — Z79.899 ENCOUNTER FOR MEDICATION REVIEW: ICD-10-CM

## 2022-01-01 DIAGNOSIS — S09.90XA TRAUMATIC INJURY OF HEAD, INITIAL ENCOUNTER: ICD-10-CM

## 2022-01-01 DIAGNOSIS — Z53.9 ERRONEOUS ENCOUNTER--DISREGARD: Primary | ICD-10-CM

## 2022-01-01 DIAGNOSIS — F03.90 DEMENTIA WITHOUT BEHAVIORAL DISTURBANCE, UNSPECIFIED DEMENTIA TYPE: ICD-10-CM

## 2022-01-01 DIAGNOSIS — N17.9 ACUTE KIDNEY INJURY (H): ICD-10-CM

## 2022-01-01 DIAGNOSIS — Z11.52 ENCOUNTER FOR SCREENING LABORATORY TESTING FOR COVID-19 VIRUS: ICD-10-CM
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
## 2022-01-01 DIAGNOSIS — R53.83 OTHER FATIGUE: ICD-10-CM

## 2022-01-01 DIAGNOSIS — N18.31 CKD STAGE G3A/A2, GFR 45-59 AND ALBUMIN CREATININE RATIO 30-299 MG/G (H): ICD-10-CM

## 2022-01-01 LAB
ALBUMIN SERPL-MCNC: 3.3 G/DL (ref 3.5–5.7)
ALBUMIN UR-MCNC: 30 MG/DL
ALP SERPL-CCNC: 69 U/L (ref 34–104)
ALT SERPL W P-5'-P-CCNC: 14 U/L (ref 7–52)
ANION GAP SERPL CALCULATED.3IONS-SCNC: 11 MMOL/L (ref 3–14)
ANION GAP SERPL CALCULATED.3IONS-SCNC: 15 MMOL/L (ref 3–14)
ANION GAP SERPL CALCULATED.3IONS-SCNC: 7 MMOL/L (ref 3–14)
APPEARANCE UR: ABNORMAL
AST SERPL W P-5'-P-CCNC: 19 U/L (ref 13–39)
ATRIAL RATE - MUSE: 141 BPM
BASOPHILS # BLD AUTO: 0.1 10E3/UL (ref 0–0.2)
BASOPHILS # BLD AUTO: 0.1 10E3/UL (ref 0–0.2)
BASOPHILS NFR BLD AUTO: 1 %
BASOPHILS NFR BLD AUTO: 1 %
BILIRUB SERPL-MCNC: 0.6 MG/DL (ref 0.3–1)
BILIRUB UR QL STRIP: NEGATIVE
BUN SERPL-MCNC: 17 MG/DL (ref 7–25)
BUN SERPL-MCNC: 51 MG/DL (ref 7–25)
BUN SERPL-MCNC: 53 MG/DL (ref 7–25)
CALCIUM SERPL-MCNC: 9 MG/DL (ref 8.6–10.3)
CALCIUM SERPL-MCNC: 9.2 MG/DL (ref 8.6–10.3)
CALCIUM SERPL-MCNC: 9.7 MG/DL (ref 8.6–10.3)
CHLORIDE BLD-SCNC: 103 MMOL/L (ref 98–107)
CHLORIDE BLD-SCNC: 104 MMOL/L (ref 98–107)
CHLORIDE BLD-SCNC: 108 MMOL/L (ref 98–107)
CO2 SERPL-SCNC: 18 MMOL/L (ref 21–31)
CO2 SERPL-SCNC: 20 MMOL/L (ref 21–31)
CO2 SERPL-SCNC: 25 MMOL/L (ref 21–31)
COLOR UR AUTO: YELLOW
CREAT SERPL-MCNC: 1.09 MG/DL (ref 0.7–1.3)
CREAT SERPL-MCNC: 1.54 MG/DL (ref 0.7–1.3)
CREAT SERPL-MCNC: 1.63 MG/DL (ref 0.7–1.3)
CREAT SERPL-MCNC: 1.7 MG/DL (ref 0.7–1.3)
DIASTOLIC BLOOD PRESSURE - MUSE: NORMAL MMHG
EOSINOPHIL # BLD AUTO: 0.1 10E3/UL (ref 0–0.7)
EOSINOPHIL # BLD AUTO: 1.1 10E3/UL (ref 0–0.7)
EOSINOPHIL NFR BLD AUTO: 1 %
EOSINOPHIL NFR BLD AUTO: 17 %
ERYTHROCYTE [DISTWIDTH] IN BLOOD BY AUTOMATED COUNT: 12.5 % (ref 10–15)
ERYTHROCYTE [DISTWIDTH] IN BLOOD BY AUTOMATED COUNT: 13.2 % (ref 10–15)
ERYTHROCYTE [DISTWIDTH] IN BLOOD BY AUTOMATED COUNT: 13.4 % (ref 10–15)
FLUAV RNA SPEC QL NAA+PROBE: NEGATIVE
FLUBV RNA RESP QL NAA+PROBE: NEGATIVE
GFR SERPL CREATININE-BSD FRML MDRD: 37 ML/MIN/1.73M2
GFR SERPL CREATININE-BSD FRML MDRD: 39 ML/MIN/1.73M2
GFR SERPL CREATININE-BSD FRML MDRD: 41 ML/MIN/1.73M2
GFR SERPL CREATININE-BSD FRML MDRD: 63 ML/MIN/1.73M2
GLUCOSE BLD-MCNC: 194 MG/DL (ref 70–105)
GLUCOSE BLD-MCNC: 201 MG/DL (ref 70–105)
GLUCOSE BLD-MCNC: 334 MG/DL (ref 70–105)
GLUCOSE BLDC GLUCOMTR-MCNC: 120 MG/DL (ref 70–99)
GLUCOSE BLDC GLUCOMTR-MCNC: 152 MG/DL (ref 70–99)
GLUCOSE BLDC GLUCOMTR-MCNC: 178 MG/DL (ref 70–99)
GLUCOSE BLDC GLUCOMTR-MCNC: 196 MG/DL (ref 70–99)
GLUCOSE BLDC GLUCOMTR-MCNC: 201 MG/DL (ref 70–99)
GLUCOSE BLDC GLUCOMTR-MCNC: 210 MG/DL (ref 70–99)
GLUCOSE BLDC GLUCOMTR-MCNC: 228 MG/DL (ref 70–99)
GLUCOSE BLDC GLUCOMTR-MCNC: 297 MG/DL (ref 70–99)
GLUCOSE BLDC GLUCOMTR-MCNC: 312 MG/DL (ref 70–99)
GLUCOSE UR STRIP-MCNC: 200 MG/DL
HBA1C MFR BLD: 7 % (ref 4–6.2)
HBA1C MFR BLD: 8.3 % (ref 4–6.2)
HCT VFR BLD AUTO: 37.2 % (ref 40–53)
HCT VFR BLD AUTO: 38.5 % (ref 40–53)
HCT VFR BLD AUTO: 38.8 % (ref 40–53)
HGB BLD-MCNC: 12.3 G/DL (ref 13.3–17.7)
HGB BLD-MCNC: 12.4 G/DL (ref 13.3–17.7)
HGB BLD-MCNC: 13 G/DL (ref 13.3–17.7)
HGB BLD-MCNC: 13.1 G/DL (ref 13.3–17.7)
HGB UR QL STRIP: NEGATIVE
IMM GRANULOCYTES # BLD: 0 10E3/UL
IMM GRANULOCYTES # BLD: 0.1 10E3/UL
IMM GRANULOCYTES NFR BLD: 1 %
IMM GRANULOCYTES NFR BLD: 1 %
INTERPRETATION ECG - MUSE: NORMAL
KETONES UR STRIP-MCNC: ABNORMAL MG/DL
LACTATE SERPL-SCNC: 3.9 MMOL/L (ref 0.7–2)
LACTATE SERPL-SCNC: 5 MMOL/L (ref 0.7–2)
LEUKOCYTE ESTERASE UR QL STRIP: ABNORMAL
LVEF ECHO: NORMAL
LYMPHOCYTES # BLD AUTO: 1.1 10E3/UL (ref 0.8–5.3)
LYMPHOCYTES # BLD AUTO: 1.2 10E3/UL (ref 0.8–5.3)
LYMPHOCYTES NFR BLD AUTO: 10 %
LYMPHOCYTES NFR BLD AUTO: 18 %
MAGNESIUM SERPL-MCNC: 2.1 MG/DL (ref 1.9–2.7)
MAGNESIUM SERPL-MCNC: 2.1 MG/DL (ref 1.9–2.7)
MCH RBC QN AUTO: 31 PG (ref 26.5–33)
MCH RBC QN AUTO: 31.7 PG (ref 26.5–33)
MCH RBC QN AUTO: 31.9 PG (ref 26.5–33)
MCHC RBC AUTO-ENTMCNC: 33.3 G/DL (ref 31.5–36.5)
MCHC RBC AUTO-ENTMCNC: 33.8 G/DL (ref 31.5–36.5)
MCHC RBC AUTO-ENTMCNC: 33.8 G/DL (ref 31.5–36.5)
MCV RBC AUTO: 93 FL (ref 78–100)
MCV RBC AUTO: 94 FL (ref 78–100)
MCV RBC AUTO: 94 FL (ref 78–100)
MONOCYTES # BLD AUTO: 0.8 10E3/UL (ref 0–1.3)
MONOCYTES # BLD AUTO: 0.9 10E3/UL (ref 0–1.3)
MONOCYTES NFR BLD AUTO: 12 %
MONOCYTES NFR BLD AUTO: 8 %
MUCOUS THREADS #/AREA URNS LPF: PRESENT /LPF
NEUTROPHILS # BLD AUTO: 3.5 10E3/UL (ref 1.6–8.3)
NEUTROPHILS # BLD AUTO: 8.9 10E3/UL (ref 1.6–8.3)
NEUTROPHILS NFR BLD AUTO: 51 %
NEUTROPHILS NFR BLD AUTO: 79 %
NITRATE UR QL: NEGATIVE
NRBC # BLD AUTO: 0 10E3/UL
NRBC # BLD AUTO: 0 10E3/UL
NRBC BLD AUTO-RTO: 0 /100
NRBC BLD AUTO-RTO: 0 /100
P AXIS - MUSE: NORMAL DEGREES
PH UR STRIP: 5 [PH] (ref 5–9)
PLATELET # BLD AUTO: 125 10E3/UL (ref 150–450)
PLATELET # BLD AUTO: 142 10E3/UL (ref 150–450)
PLATELET # BLD AUTO: 152 10E3/UL (ref 150–450)
POTASSIUM BLD-SCNC: 4.2 MMOL/L (ref 3.5–5.1)
POTASSIUM BLD-SCNC: 4.4 MMOL/L (ref 3.5–5.1)
POTASSIUM BLD-SCNC: 4.5 MMOL/L (ref 3.5–5.1)
PR INTERVAL - MUSE: NORMAL MS
PROT SERPL-MCNC: 6 G/DL (ref 6.4–8.9)
QRS DURATION - MUSE: 130 MS
QT - MUSE: 354 MS
QTC - MUSE: 528 MS
R AXIS - MUSE: -52 DEGREES
RBC # BLD AUTO: 4 10E6/UL (ref 4.4–5.9)
RBC # BLD AUTO: 4.1 10E6/UL (ref 4.4–5.9)
RBC # BLD AUTO: 4.11 10E6/UL (ref 4.4–5.9)
RBC URINE: 7 /HPF
RETINOPATHY: NORMAL
RSV RNA SPEC NAA+PROBE: NEGATIVE
SARS-COV-2 RNA RESP QL NAA+PROBE: NEGATIVE
SODIUM SERPL-SCNC: 136 MMOL/L (ref 134–144)
SODIUM SERPL-SCNC: 136 MMOL/L (ref 134–144)
SODIUM SERPL-SCNC: 139 MMOL/L (ref 134–144)
SP GR UR STRIP: 1.03 (ref 1–1.03)
SYSTOLIC BLOOD PRESSURE - MUSE: NORMAL MMHG
T AXIS - MUSE: 105 DEGREES
TROPONIN I SERPL-MCNC: 1346.8 PG/ML (ref 0–34)
TROPONIN I SERPL-MCNC: 1510.1 PG/ML (ref 0–34)
TROPONIN I SERPL-MCNC: 519.3 PG/ML (ref 0–34)
TROPONIN I SERPL-MCNC: 558.2 PG/ML (ref 0–34)
TROPONIN I SERPL-MCNC: 762.4 PG/ML (ref 0–34)
TROPONIN I SERPL-MCNC: 879.7 PG/ML (ref 0–34)
TSH SERPL DL<=0.005 MIU/L-ACNC: 2.2 MU/L (ref 0.4–4)
TSH SERPL DL<=0.005 MIU/L-ACNC: 3.3 MU/L (ref 0.4–4)
UROBILINOGEN UR STRIP-MCNC: NORMAL MG/DL
VENTRICULAR RATE- MUSE: 134 BPM
VIT B12 SERPL-MCNC: 849 PG/ML (ref 180–914)
WBC # BLD AUTO: 11.1 10E3/UL (ref 4–11)
WBC # BLD AUTO: 6.7 10E3/UL (ref 4–11)
WBC # BLD AUTO: 9.1 10E3/UL (ref 4–11)
WBC URINE: 15 /HPF

## 2022-01-01 PROCEDURE — 83735 ASSAY OF MAGNESIUM: CPT | Performed by: STUDENT IN AN ORGANIZED HEALTH CARE EDUCATION/TRAINING PROGRAM

## 2022-01-01 PROCEDURE — 99223 1ST HOSP IP/OBS HIGH 75: CPT | Mod: AI | Performed by: INTERNAL MEDICINE

## 2022-01-01 PROCEDURE — C9803 HOPD COVID-19 SPEC COLLECT: HCPCS | Performed by: STUDENT IN AN ORGANIZED HEALTH CARE EDUCATION/TRAINING PROGRAM

## 2022-01-01 PROCEDURE — 87637 SARSCOV2&INF A&B&RSV AMP PRB: CPT | Performed by: EMERGENCY MEDICINE

## 2022-01-01 PROCEDURE — 120N000001 HC R&B MED SURG/OB

## 2022-01-01 PROCEDURE — 97530 THERAPEUTIC ACTIVITIES: CPT | Mod: GP

## 2022-01-01 PROCEDURE — 84443 ASSAY THYROID STIM HORMONE: CPT | Performed by: STUDENT IN AN ORGANIZED HEALTH CARE EDUCATION/TRAINING PROGRAM

## 2022-01-01 PROCEDURE — 82607 VITAMIN B-12: CPT | Performed by: NURSE PRACTITIONER

## 2022-01-01 PROCEDURE — 96374 THER/PROPH/DIAG INJ IV PUSH: CPT | Mod: XU | Performed by: STUDENT IN AN ORGANIZED HEALTH CARE EDUCATION/TRAINING PROGRAM

## 2022-01-01 PROCEDURE — 250N000013 HC RX MED GY IP 250 OP 250 PS 637: Performed by: INTERNAL MEDICINE

## 2022-01-01 PROCEDURE — 82374 ASSAY BLOOD CARBON DIOXIDE: CPT | Performed by: INTERNAL MEDICINE

## 2022-01-01 PROCEDURE — 85027 COMPLETE CBC AUTOMATED: CPT | Performed by: INTERNAL MEDICINE

## 2022-01-01 PROCEDURE — G0390 TRAUMA RESPONS W/HOSP CRITI: HCPCS | Performed by: STUDENT IN AN ORGANIZED HEALTH CARE EDUCATION/TRAINING PROGRAM

## 2022-01-01 PROCEDURE — 99291 CRITICAL CARE FIRST HOUR: CPT | Mod: 25 | Performed by: STUDENT IN AN ORGANIZED HEALTH CARE EDUCATION/TRAINING PROGRAM

## 2022-01-01 PROCEDURE — G1010 CDSM STANSON: HCPCS | Mod: TC

## 2022-01-01 PROCEDURE — 250N000009 HC RX 250: Performed by: EMERGENCY MEDICINE

## 2022-01-01 PROCEDURE — 84484 ASSAY OF TROPONIN QUANT: CPT | Performed by: INTERNAL MEDICINE

## 2022-01-01 PROCEDURE — 84484 ASSAY OF TROPONIN QUANT: CPT | Mod: 91 | Performed by: STUDENT IN AN ORGANIZED HEALTH CARE EDUCATION/TRAINING PROGRAM

## 2022-01-01 PROCEDURE — 99232 SBSQ HOSP IP/OBS MODERATE 35: CPT | Performed by: INTERNAL MEDICINE

## 2022-01-01 PROCEDURE — 85025 COMPLETE CBC W/AUTO DIFF WBC: CPT | Performed by: NURSE PRACTITIONER

## 2022-01-01 PROCEDURE — 36415 COLL VENOUS BLD VENIPUNCTURE: CPT | Performed by: INTERNAL MEDICINE

## 2022-01-01 PROCEDURE — 80053 COMPREHEN METABOLIC PANEL: CPT | Performed by: NURSE PRACTITIONER

## 2022-01-01 PROCEDURE — 87040 BLOOD CULTURE FOR BACTERIA: CPT | Performed by: INTERNAL MEDICINE

## 2022-01-01 PROCEDURE — 99291 CRITICAL CARE FIRST HOUR: CPT | Performed by: STUDENT IN AN ORGANIZED HEALTH CARE EDUCATION/TRAINING PROGRAM

## 2022-01-01 PROCEDURE — 80048 BASIC METABOLIC PNL TOTAL CA: CPT | Performed by: STUDENT IN AN ORGANIZED HEALTH CARE EDUCATION/TRAINING PROGRAM

## 2022-01-01 PROCEDURE — 87086 URINE CULTURE/COLONY COUNT: CPT | Performed by: INTERNAL MEDICINE

## 2022-01-01 PROCEDURE — 82565 ASSAY OF CREATININE: CPT | Performed by: INTERNAL MEDICINE

## 2022-01-01 PROCEDURE — 250N000009 HC RX 250: Performed by: STUDENT IN AN ORGANIZED HEALTH CARE EDUCATION/TRAINING PROGRAM

## 2022-01-01 PROCEDURE — 97161 PT EVAL LOW COMPLEX 20 MIN: CPT | Mod: GP

## 2022-01-01 PROCEDURE — 250N000011 HC RX IP 250 OP 636: Performed by: INTERNAL MEDICINE

## 2022-01-01 PROCEDURE — 258N000003 HC RX IP 258 OP 636: Performed by: INTERNAL MEDICINE

## 2022-01-01 PROCEDURE — 250N000013 HC RX MED GY IP 250 OP 250 PS 637: Performed by: EMERGENCY MEDICINE

## 2022-01-01 PROCEDURE — 93306 TTE W/DOPPLER COMPLETE: CPT

## 2022-01-01 PROCEDURE — 83735 ASSAY OF MAGNESIUM: CPT | Performed by: INTERNAL MEDICINE

## 2022-01-01 PROCEDURE — 97530 THERAPEUTIC ACTIVITIES: CPT | Mod: GO | Performed by: OCCUPATIONAL THERAPIST

## 2022-01-01 PROCEDURE — 97165 OT EVAL LOW COMPLEX 30 MIN: CPT | Mod: GO | Performed by: OCCUPATIONAL THERAPIST

## 2022-01-01 PROCEDURE — 99239 HOSP IP/OBS DSCHRG MGMT >30: CPT | Performed by: INTERNAL MEDICINE

## 2022-01-01 PROCEDURE — 36415 COLL VENOUS BLD VENIPUNCTURE: CPT | Performed by: NURSE PRACTITIONER

## 2022-01-01 PROCEDURE — 93005 ELECTROCARDIOGRAM TRACING: CPT | Performed by: STUDENT IN AN ORGANIZED HEALTH CARE EDUCATION/TRAINING PROGRAM

## 2022-01-01 PROCEDURE — 83036 HEMOGLOBIN GLYCOSYLATED A1C: CPT | Performed by: NURSE PRACTITIONER

## 2022-01-01 PROCEDURE — 93010 ELECTROCARDIOGRAM REPORT: CPT | Performed by: INTERNAL MEDICINE

## 2022-01-01 PROCEDURE — 93306 TTE W/DOPPLER COMPLETE: CPT | Mod: 26 | Performed by: INTERNAL MEDICINE

## 2022-01-01 PROCEDURE — 84443 ASSAY THYROID STIM HORMONE: CPT | Performed by: NURSE PRACTITIONER

## 2022-01-01 PROCEDURE — 250N000012 HC RX MED GY IP 250 OP 636 PS 637: Performed by: INTERNAL MEDICINE

## 2022-01-01 PROCEDURE — 36415 COLL VENOUS BLD VENIPUNCTURE: CPT | Performed by: EMERGENCY MEDICINE

## 2022-01-01 PROCEDURE — 85018 HEMOGLOBIN: CPT | Performed by: NURSE PRACTITIONER

## 2022-01-01 PROCEDURE — 258N000003 HC RX IP 258 OP 636: Performed by: EMERGENCY MEDICINE

## 2022-01-01 PROCEDURE — 84484 ASSAY OF TROPONIN QUANT: CPT | Performed by: EMERGENCY MEDICINE

## 2022-01-01 PROCEDURE — 84484 ASSAY OF TROPONIN QUANT: CPT | Mod: 91 | Performed by: INTERNAL MEDICINE

## 2022-01-01 PROCEDURE — 96375 TX/PRO/DX INJ NEW DRUG ADDON: CPT | Performed by: STUDENT IN AN ORGANIZED HEALTH CARE EDUCATION/TRAINING PROGRAM

## 2022-01-01 PROCEDURE — 81001 URINALYSIS AUTO W/SCOPE: CPT | Performed by: INTERNAL MEDICINE

## 2022-01-01 PROCEDURE — 83605 ASSAY OF LACTIC ACID: CPT | Performed by: INTERNAL MEDICINE

## 2022-01-01 PROCEDURE — 82310 ASSAY OF CALCIUM: CPT | Performed by: INTERNAL MEDICINE

## 2022-01-01 PROCEDURE — 97116 GAIT TRAINING THERAPY: CPT | Mod: GP

## 2022-01-01 PROCEDURE — 85025 COMPLETE CBC W/AUTO DIFF WBC: CPT | Performed by: STUDENT IN AN ORGANIZED HEALTH CARE EDUCATION/TRAINING PROGRAM

## 2022-01-01 PROCEDURE — 36415 COLL VENOUS BLD VENIPUNCTURE: CPT | Performed by: STUDENT IN AN ORGANIZED HEALTH CARE EDUCATION/TRAINING PROGRAM

## 2022-01-01 RX ORDER — METOPROLOL TARTRATE 1 MG/ML
5 INJECTION, SOLUTION INTRAVENOUS EVERY 4 HOURS PRN
Status: DISCONTINUED | OUTPATIENT
Start: 2022-01-01 | End: 2022-01-01 | Stop reason: HOSPADM

## 2022-01-01 RX ORDER — GLIPIZIDE 5 MG/1
2.5 TABLET ORAL
Qty: 90 TABLET | Refills: 1 | Status: SHIPPED | OUTPATIENT
Start: 2022-01-01

## 2022-01-01 RX ORDER — LIDOCAINE 40 MG/G
CREAM TOPICAL
Status: DISCONTINUED | OUTPATIENT
Start: 2022-01-01 | End: 2022-01-01 | Stop reason: HOSPADM

## 2022-01-01 RX ORDER — MORPHINE SULFATE 20 MG/ML
5-10 SOLUTION ORAL
Status: DISCONTINUED | OUTPATIENT
Start: 2022-01-01 | End: 2022-01-01 | Stop reason: HOSPADM

## 2022-01-01 RX ORDER — ATROPINE SULFATE 10 MG/ML
2 SOLUTION/ DROPS OPHTHALMIC EVERY 4 HOURS PRN
Status: DISCONTINUED | OUTPATIENT
Start: 2022-01-01 | End: 2022-01-01 | Stop reason: HOSPADM

## 2022-01-01 RX ORDER — GLIPIZIDE 10 MG/1
1 TABLET ORAL DAILY PRN
Status: DISCONTINUED | OUTPATIENT
Start: 2022-01-01 | End: 2022-01-01 | Stop reason: HOSPADM

## 2022-01-01 RX ORDER — FINASTERIDE 5 MG/1
5 TABLET, FILM COATED ORAL DAILY
Status: DISCONTINUED | OUTPATIENT
Start: 2022-01-01 | End: 2022-01-01

## 2022-01-01 RX ORDER — ACETAMINOPHEN 325 MG/1
975 TABLET ORAL EVERY 8 HOURS PRN
Status: DISCONTINUED | OUTPATIENT
Start: 2022-01-01 | End: 2022-01-01 | Stop reason: HOSPADM

## 2022-01-01 RX ORDER — ENOXAPARIN SODIUM 100 MG/ML
40 INJECTION SUBCUTANEOUS DAILY
Status: DISCONTINUED | OUTPATIENT
Start: 2022-01-01 | End: 2022-01-01

## 2022-01-01 RX ORDER — QUETIAPINE FUMARATE 25 MG/1
25 TABLET, FILM COATED ORAL 2 TIMES DAILY
Status: DISCONTINUED | OUTPATIENT
Start: 2022-01-01 | End: 2022-01-01 | Stop reason: HOSPADM

## 2022-01-01 RX ORDER — METOPROLOL TARTRATE 1 MG/ML
5 INJECTION, SOLUTION INTRAVENOUS ONCE
Status: COMPLETED | OUTPATIENT
Start: 2022-01-01 | End: 2022-01-01

## 2022-01-01 RX ORDER — SODIUM CHLORIDE 9 MG/ML
INJECTION, SOLUTION INTRAVENOUS CONTINUOUS
Status: DISCONTINUED | OUTPATIENT
Start: 2022-01-01 | End: 2022-01-01

## 2022-01-01 RX ORDER — QUETIAPINE FUMARATE 25 MG/1
25 TABLET, FILM COATED ORAL 2 TIMES DAILY
Status: DISCONTINUED | OUTPATIENT
Start: 2022-01-01 | End: 2022-01-01

## 2022-01-01 RX ORDER — NALOXONE HYDROCHLORIDE 0.4 MG/ML
0.1 INJECTION, SOLUTION INTRAMUSCULAR; INTRAVENOUS; SUBCUTANEOUS
Status: DISCONTINUED | OUTPATIENT
Start: 2022-01-01 | End: 2022-01-01 | Stop reason: HOSPADM

## 2022-01-01 RX ORDER — NALOXONE HYDROCHLORIDE 0.4 MG/ML
0.2 INJECTION, SOLUTION INTRAMUSCULAR; INTRAVENOUS; SUBCUTANEOUS
Status: DISCONTINUED | OUTPATIENT
Start: 2022-01-01 | End: 2022-01-01 | Stop reason: HOSPADM

## 2022-01-01 RX ORDER — LORAZEPAM 1 MG/1
1 TABLET ORAL
Status: DISCONTINUED | OUTPATIENT
Start: 2022-01-01 | End: 2022-01-01 | Stop reason: HOSPADM

## 2022-01-01 RX ORDER — NICOTINE POLACRILEX 4 MG
15-30 LOZENGE BUCCAL
Status: DISCONTINUED | OUTPATIENT
Start: 2022-01-01 | End: 2022-01-01 | Stop reason: HOSPADM

## 2022-01-01 RX ORDER — ASPIRIN 81 MG/1
81 TABLET, CHEWABLE ORAL DAILY
Status: DISCONTINUED | OUTPATIENT
Start: 2022-01-01 | End: 2022-01-01

## 2022-01-01 RX ORDER — ONDANSETRON 4 MG/1
4 TABLET, ORALLY DISINTEGRATING ORAL EVERY 6 HOURS PRN
Status: DISCONTINUED | OUTPATIENT
Start: 2022-01-01 | End: 2022-01-01 | Stop reason: HOSPADM

## 2022-01-01 RX ORDER — LIDOCAINE 40 MG/G
CREAM TOPICAL
Status: DISCONTINUED | OUTPATIENT
Start: 2022-01-01 | End: 2022-01-01

## 2022-01-01 RX ORDER — LORAZEPAM 2 MG/ML
0.25 INJECTION INTRAMUSCULAR ONCE
Status: COMPLETED | OUTPATIENT
Start: 2022-01-01 | End: 2022-01-01

## 2022-01-01 RX ORDER — METOPROLOL TARTRATE 25 MG/1
25 TABLET, FILM COATED ORAL ONCE
Status: COMPLETED | OUTPATIENT
Start: 2022-01-01 | End: 2022-01-01

## 2022-01-01 RX ORDER — LISINOPRIL 20 MG/1
20 TABLET ORAL DAILY
Status: DISCONTINUED | OUTPATIENT
Start: 2022-01-01 | End: 2022-01-01

## 2022-01-01 RX ORDER — ASPIRIN 81 MG/1
TABLET, CHEWABLE ORAL
Qty: 90 TABLET | Refills: 2 | Status: SHIPPED | OUTPATIENT
Start: 2022-01-01

## 2022-01-01 RX ORDER — ONDANSETRON 2 MG/ML
4 INJECTION INTRAMUSCULAR; INTRAVENOUS EVERY 6 HOURS PRN
Status: DISCONTINUED | OUTPATIENT
Start: 2022-01-01 | End: 2022-01-01 | Stop reason: HOSPADM

## 2022-01-01 RX ORDER — CARBOXYMETHYLCELLULOSE SODIUM 5 MG/ML
1 SOLUTION/ DROPS OPHTHALMIC DAILY PRN
COMMUNITY

## 2022-01-01 RX ORDER — LORAZEPAM 2 MG/ML
1 INJECTION INTRAMUSCULAR
Status: DISCONTINUED | OUTPATIENT
Start: 2022-01-01 | End: 2022-01-01 | Stop reason: HOSPADM

## 2022-01-01 RX ORDER — DEXTROSE MONOHYDRATE 25 G/50ML
25-50 INJECTION, SOLUTION INTRAVENOUS
Status: DISCONTINUED | OUTPATIENT
Start: 2022-01-01 | End: 2022-01-01 | Stop reason: HOSPADM

## 2022-01-01 RX ORDER — METOPROLOL TARTRATE 25 MG/1
25 TABLET, FILM COATED ORAL 2 TIMES DAILY
Status: DISCONTINUED | OUTPATIENT
Start: 2022-01-01 | End: 2022-01-01

## 2022-01-01 RX ADMIN — TAZOBACTAM SODIUM AND PIPERACILLIN SODIUM 3.38 G: 375; 3 INJECTION, SOLUTION INTRAVENOUS at 20:36

## 2022-01-01 RX ADMIN — FINASTERIDE 5 MG: 5 TABLET, FILM COATED ORAL at 13:52

## 2022-01-01 RX ADMIN — ASPIRIN 81 MG CHEWABLE TABLET 81 MG: 81 TABLET CHEWABLE at 13:52

## 2022-01-01 RX ADMIN — MORPHINE SULFATE 5 MG: 100 SOLUTION ORAL at 19:43

## 2022-01-01 RX ADMIN — TAZOBACTAM SODIUM AND PIPERACILLIN SODIUM 3.38 G: 375; 3 INJECTION, SOLUTION INTRAVENOUS at 02:05

## 2022-01-01 RX ADMIN — TAZOBACTAM SODIUM AND PIPERACILLIN SODIUM 3.38 G: 375; 3 INJECTION, SOLUTION INTRAVENOUS at 14:38

## 2022-01-01 RX ADMIN — ASPIRIN 81 MG CHEWABLE TABLET 81 MG: 81 TABLET CHEWABLE at 08:07

## 2022-01-01 RX ADMIN — METOPROLOL TARTRATE 25 MG: 25 TABLET, FILM COATED ORAL at 21:55

## 2022-01-01 RX ADMIN — METOPROLOL TARTRATE 5 MG: 5 INJECTION INTRAVENOUS at 06:50

## 2022-01-01 RX ADMIN — LORAZEPAM 1 MG: 2 INJECTION, SOLUTION INTRAMUSCULAR; INTRAVENOUS at 22:16

## 2022-01-01 RX ADMIN — INSULIN ASPART 1 UNITS: 100 INJECTION, SOLUTION INTRAVENOUS; SUBCUTANEOUS at 16:34

## 2022-01-01 RX ADMIN — SODIUM CHLORIDE: 9 INJECTION, SOLUTION INTRAVENOUS at 09:57

## 2022-01-01 RX ADMIN — ENOXAPARIN SODIUM 40 MG: 40 INJECTION SUBCUTANEOUS at 13:52

## 2022-01-01 RX ADMIN — QUETIAPINE FUMARATE 25 MG: 25 TABLET ORAL at 15:26

## 2022-01-01 RX ADMIN — QUETIAPINE FUMARATE 25 MG: 25 TABLET ORAL at 08:08

## 2022-01-01 RX ADMIN — METOPROLOL SUCCINATE 12.5 MG: 25 TABLET, EXTENDED RELEASE ORAL at 08:02

## 2022-01-01 RX ADMIN — MORPHINE SULFATE 5 MG: 100 SOLUTION ORAL at 21:50

## 2022-01-01 RX ADMIN — INSULIN ASPART 2 UNITS: 100 INJECTION, SOLUTION INTRAVENOUS; SUBCUTANEOUS at 11:03

## 2022-01-01 RX ADMIN — SODIUM CHLORIDE: 9 INJECTION, SOLUTION INTRAVENOUS at 18:08

## 2022-01-01 RX ADMIN — TAZOBACTAM SODIUM AND PIPERACILLIN SODIUM 3.38 G: 375; 3 INJECTION, SOLUTION INTRAVENOUS at 07:27

## 2022-01-01 RX ADMIN — SODIUM CHLORIDE 500 ML: 9 INJECTION, SOLUTION INTRAVENOUS at 11:57

## 2022-01-01 RX ADMIN — FINASTERIDE 5 MG: 5 TABLET, FILM COATED ORAL at 08:07

## 2022-01-01 RX ADMIN — MORPHINE SULFATE 5 MG: 100 SOLUTION ORAL at 21:29

## 2022-01-01 RX ADMIN — SODIUM CHLORIDE: 9 INJECTION, SOLUTION INTRAVENOUS at 05:56

## 2022-01-01 RX ADMIN — MORPHINE SULFATE 5 MG: 100 SOLUTION ORAL at 12:02

## 2022-01-01 RX ADMIN — MORPHINE SULFATE 5 MG: 100 SOLUTION ORAL at 15:16

## 2022-01-01 RX ADMIN — ENOXAPARIN SODIUM 40 MG: 40 INJECTION SUBCUTANEOUS at 10:02

## 2022-01-01 RX ADMIN — INSULIN ASPART 4 UNITS: 100 INJECTION, SOLUTION INTRAVENOUS; SUBCUTANEOUS at 17:01

## 2022-01-01 RX ADMIN — METOPROLOL TARTRATE 5 MG: 5 INJECTION INTRAVENOUS at 09:57

## 2022-01-01 RX ADMIN — QUETIAPINE FUMARATE 25 MG: 25 TABLET ORAL at 23:04

## 2022-01-01 RX ADMIN — SODIUM CHLORIDE 500 ML: 9 INJECTION, SOLUTION INTRAVENOUS at 07:56

## 2022-01-01 RX ADMIN — METOPROLOL TARTRATE 25 MG: 25 TABLET, FILM COATED ORAL at 09:57

## 2022-01-01 RX ADMIN — MORPHINE SULFATE 10 MG: 100 SOLUTION ORAL at 23:30

## 2022-01-01 RX ADMIN — LORAZEPAM 0.25 MG: 2 INJECTION, SOLUTION INTRAMUSCULAR; INTRAVENOUS at 14:58

## 2022-01-01 RX ADMIN — METOPROLOL TARTRATE 25 MG: 25 TABLET, FILM COATED ORAL at 08:07

## 2022-01-01 ASSESSMENT — ACTIVITIES OF DAILY LIVING (ADL)
ADLS_ACUITY_SCORE: 43
ADLS_ACUITY_SCORE: 45
ADLS_ACUITY_SCORE: 45
BATHING: 1-->ASSISTANCE NEEDED
ADLS_ACUITY_SCORE: 35
ADLS_ACUITY_SCORE: 43
ADLS_ACUITY_SCORE: 43
NUMBER_OF_TIMES_PATIENT_HAS_FALLEN_WITHIN_LAST_SIX_MONTHS: 4
ADLS_ACUITY_SCORE: 35
TOILETING_ISSUES: YES
ADLS_ACUITY_SCORE: 35
FALL_HISTORY_WITHIN_LAST_SIX_MONTHS: YES
DRESSING/BATHING_DIFFICULTY: YES
ADLS_ACUITY_SCORE: 37
DIFFICULTY_COMMUNICATING: NO
ADLS_ACUITY_SCORE: 37
ADLS_ACUITY_SCORE: 45
DRESS: 1-->ASSISTANCE (EQUIPMENT/PERSON) NEEDED
ADLS_ACUITY_SCORE: 45
ADLS_ACUITY_SCORE: 45
TOILETING_ASSISTANCE: TOILETING DIFFICULTY, ASSISTANCE 1 PERSON
DIFFICULTY_EATING/SWALLOWING: NO
ADLS_ACUITY_SCORE: 45
TOILETING: 1-->ASSISTANCE (EQUIPMENT/PERSON) NEEDED (NOT DEVELOPMENTALLY APPROPRIATE)
ADLS_ACUITY_SCORE: 43
DRESS: 1-->ASSISTANCE (EQUIPMENT/PERSON) NEEDED (NOT DEVELOPMENTALLY APPROPRIATE)
ADLS_ACUITY_SCORE: 45
ADLS_ACUITY_SCORE: 35
EQUIPMENT_CURRENTLY_USED_AT_HOME: WALKER, ROLLING
ADLS_ACUITY_SCORE: 43
DOING_ERRANDS_INDEPENDENTLY_DIFFICULTY: NO
CHANGE_IN_FUNCTIONAL_STATUS_SINCE_ONSET_OF_CURRENT_ILLNESS/INJURY: NO
TOILETING: 1-->ASSISTANCE (EQUIPMENT/PERSON) NEEDED
HEARING_DIFFICULTY_OR_DEAF: NO
CONCENTRATING,_REMEMBERING_OR_MAKING_DECISIONS_DIFFICULTY: YES
ADLS_ACUITY_SCORE: 47
ADLS_ACUITY_SCORE: 37
WEAR_GLASSES_OR_BLIND: NO
ADLS_ACUITY_SCORE: 47
WALKING_OR_CLIMBING_STAIRS_DIFFICULTY: NO
ADLS_ACUITY_SCORE: 37
DRESSING/BATHING: DRESSING DIFFICULTY, ASSISTANCE 1 PERSON;BATHING DIFFICULTY, ASSISTANCE 1 PERSON

## 2022-02-09 PROBLEM — Z86.79 HISTORY OF ATRIAL FIBRILLATION: Status: ACTIVE | Noted: 2022-01-01

## 2022-02-09 NOTE — Clinical Note
HIM, please copy Face to face for DME note to [facility] medical  For wheelchair assessment necessary documentation  Thanks  Calista

## 2022-02-09 NOTE — PROGRESS NOTES
Jeronimo Payton is a 89 year old male being seen today for follow up visit visit at [facility].    Code Status: Advance Directives: YES-.   Health Care Power of : Extended Emergency Contact Information  Primary Emergency Contact: Chase Payton   [facility]  Home Phone: [phone]  Relation: Son  Secondary Emergency Contact: Shanae Arevalo  Home Phone: [phone]  Relation: Other     Allergies: Alatrofloxacin and No clinical screening - see comments     Chief Complaint / HPI: Nursing staff report resident has become more unsteady with generalized weakness--was walking independently previously with walker, currently using wheelchair and walker intermittently with standby assistance.  Significant stressors past year with the loss of his wife within the past year.Cognition has been declining.  Spoke with resident today, oriented to self however thought the year was 1999, and that he was at his house.  Nurse manager reports they have had family care conferences and planning to move to secure memory unit for higher staff to resident ratio and ability to meet increasing daily physical functional and cognitive needs.  My understanding per nursing staff is they are waiting for one sibling who is out of town so that all siblings are agreeable with transitioning move to memory unit.  Resident is very pleasant, soft-spoken, leaning forward in wheelchair at times--requiring prompting and assistance.  Chart review history of atrial fibrillation, diet-controlled diabetes, hyperlipidemia and chronic kidney disease.    Staff are requesting orders for OT assessment for updated cognitive and ADL evaluation which is very appropriate to best assess and develop optimal treatment plan.  Resident unable to provide history due to cognitive decline. The last time I spoke with the resident was when his wife was still living after moving into the [facility] last year and he was visibly physically active and functioning  independently, managing some IADLS.      Face-to-face visit for wheelchair due to decreasing gait instability and cognitive decline secondary to advancing dementia.  I was able to meet with OT to discuss initial impressions on cognitive and functional assessment and felt determined moderate progressive dementia status.  Gait unstable, staff report has had several falls and near falls--now requiring standby assistance with transfers and walker--and often requiring wheelchair mobility due to weakness.  Resident currently using borrowed wheelchair, but will need and benefit from own wheelchair to meet mobility needs and provide optimal independence.  Would recommend and appreciate OT wheelchair evaluation and assessment for recommendations on necessary specifics that will meet his needs through end-of-life.            Documentation of Face-to-Face and Certification for Home Health DME WHEELCHAIR     Patient: Jeronimo Payton   YOB: 1927  MR Number: [id number]  Today's Date: 2/16/2022    I certify that patient: Jeronimo Payton is under my care and that I, or a nurse practitioner or physician's assistant working with me, had a face-to-face encounter that meets the physician face-to-face encounter requirements with this patient on: 2/9/2022.    This encounter with the patient was in whole, or in part, for the following medical condition, which is the primary reason for home health DME Wheelchair: (F03.90) Dementia without behavioral disturbance, unspecified dementia type (H)  (primary encounter diagnosis)(R26.81) Unstable gait        I certify that, based on my findings, the following services are medically necessary home health services: DME Wheelchair.    My clinical findings support the need for the above services because: DME Wheelchair for gait instability, advancing dementia, falls, independence with mobility    Further, I certify that my clinical findings support that this patient is homebound (i.e.  absences from home require considerable and taxing effort and are for medical reasons or [employer]ian[employer] services or infrequently or of short duration when for other reasons) because: Requires assistance of another person or specialized equipment to access medical services because patient: Is prone to wander/get lost without assistance., Is unable to exit home safely on own due to: Advancing dementia., Range of motion limitations prevents ability to exit home safely. and Requires supervision of another for safe transfer...    Based on the above findings. I certify that this patient is confined to the home and needs intermittent skilled nursing care, physical therapy and/or speech therapy.  The patient is under my care, and I have initiated the establishment of the plan of care.  This patient will be followed by a physician who will periodically review the plan of care.  Physician/Provider to provide follow up care: Gene Atwood    Responsible Medicare certified [place] Physician: Dr Atwood  Physician Signature: See electronic signature associated with these discharge orders.  Date: 2/16/2022      Past Medical, Surgical, Family and Social History reviewed: YES.     Medications: reviewed  Medications - recent changes: none    Review of Systems:  General: positive for weakness  Musculoskeletal: positive for muscular weakness  Neurologic: positive for memory problems and behavior changes  Psychiatric: positive for confused, depressed mood  Endocrine: positive for diabetes  All other systems negative  Toileting:    Continent of Bowel: Yes   Continent of Bladder: Yes  Mobility: walker and wheelchair    Recent Labs: None      Current Therapies: none    Exam:  Vital signs reviewed.   GENERAL APPEARANCE:  alert and no distress  EYES: Eyes grossly normal to inspection, conjunctivae and sclerae normal  RESP: lungs clear   MS: no musculoskeletal defects are noted, seated in wheelchair, leaning forward--requires prompting, visibly  "weak  SKIN: warm and dry  NEURO: Appears generally weak, drowsy, easily aroused with verbal stim  PSYCH: Drowsy, depressed mood, confused    Assessment and Plan:    Apparent cognitive decline, nursing staff reporting recent change in mobility over \"past couple weeks\" becoming more dependent on staff assistance, sometimes needing wheelchair, Previously independent with walker. Staff deny any know falls or injury, no symptomatic focal pain.  Agree with OT updated cognitive and ADL assessment and evaluation, and would also recommend  physical therapy functional assessment.    Will obtain updated labs next facility lab day--or sooner if admits to home care services, would be best not to transport into clinic at this transition unless  Absolutely necessary  Will obtain labs to assess TSH, A1c, CBC, CMP, B12 with recent decline    Followup PRN        (F03.90) Dementia without behavioral disturbance, unspecified dementia type (H)  (primary encounter diagnosis)      (R41.89) Cognitive decline      (M62.81) Generalized muscle weakness      (R53.81) Declining functional status      (Z59.3) Living in assisted living      (Z86.79) History of atrial fibrillation      (N18.31) CKD stage G3a/A2, GFR 45-59 and albumin creatinine ratio  mg/g (H)      (E11.9) Controlled type 2 diabetes mellitus without complication, without long-term current use of insulin (H)      (Z87.438) History of prostatic hyperplasia      (Z74.1) Requires assistance with activities of daily living (ADL)      (R26.81) Unstable gait        "

## 2022-02-17 NOTE — ADDENDUM NOTE
Addended by: JUAN ALBERTO PERLA on: 2/16/2022 09:42 PM     Modules accepted: Orders, Level of Service

## 2022-03-30 NOTE — PROGRESS NOTES
Jeronimo Payton is a 89 year old male being seen today for follow up visit visit at [facility].    Code Status: Advance Directives: YES-.   Health Care Power of : Extended Emergency Contact Information  Primary Emergency Contact: Chase Payton   [facility]  Home Phone: [phone]  Relation: Son  Secondary Emergency Contact: Shanae Arevalo  Home Phone: [phone]  Relation: Other     Allergies: Alatrofloxacin and No clinical screening - see comments     Chief Complaint / HPI: Follow-up on recent transfer from assisted living to [facility] unit due to advancing memory loss.  Resident has had decline over the past year and loss of his spouse.  Was previously independent, now dependent on walker for mobility--gait is unsteady at baseline.  Recent labs were checked with declining memory and reasonably negative findings except for A1c of 8.3%--- previously 7.1 without any diabetic medications.  The staff report resident has acclimated well to memory unit with higher staff to resident ratio and more structured environment.  Staff do not raise any other concerns--resident is confused at baseline oriented to self only--and limited historian    Past Medical, Surgical, Family and Social History reviewed: YES.     Medications: reviewed  Medications - recent changes: none    Review of Systems:  General: positive for weakness  Neurologic: positive for memory problems  Endocrine: positive for diabetes  All other systems negative  Toileting:    Continent of Bowel: Yes   Continent of Bladder: Yes  Mobility: walker    Recent Labs: 2/24/22    Hemoglobin A1C 4.0 - 6.2 % 8.3 High   7.1 High           Current Therapies: none    Exam:  Vital signs reviewed.   GENERAL APPEARANCE: alert and no distress  EYES: Eyes grossly normal to inspection, conjunctivae and sclerae normal  RESP: lungs clear   MS: no musculoskeletal defects are noted and gait is walker dependent  SKIN: warm and dry  NEURO:  Pleasantly confused, speech normal  PSYCH: mentation appears normal and affect normal/bright    Assessment and Plan:    We will start low-dose glipizide 2.5 mg at breakfast daily--we will recheck A1c in 3 months  Followup PRN    No other medications or adjustments indicated at this time--- family is agreeable with above plan      (E11.9) DM type 2, not at goal (H)  (primary encounter diagnosis)    Plan: glipiZIDE (GLUCOTROL) 5 MG tablet            (Z79.899) Encounter for medication review      (F03.90) Dementia without behavioral disturbance, unspecified dementia type (H)

## 2022-07-05 NOTE — PROGRESS NOTES
Spoke to patient. Appt scheduled for 9/26/22 at 0740.     Patient requested late Sept appt due to schedule conflicts and travel. (fyi)    See refill request.   Subjective     Jeronimo Payton is a 89 year old male who presents to clinic today for the following health issues:    HPI       Diabetes Follow-up    How often are you checking your blood sugar? A few times a month  What time of day are you checking your blood sugars (select all that apply)?  Before meals  Have you had any blood sugars above 200?  No  Have you had any blood sugars below 70?  No    What symptoms do you notice when your blood sugar is low?  None    What concerns do you have today about your diabetes? None     Do you have any of these symptoms? (Select all that apply)  No numbness or tingling in feet.  No redness, sores or blisters on feet.  No complaints of excessive thirst.  No reports of blurry vision.  No significant changes to weight.        Hyperlipidemia Follow-Up      Are you regularly taking any medication or supplement to lower your cholesterol?   No    Are you having muscle aches or other side effects that you think could be caused by your cholesterol lowering medication?  No    Hypertension Follow-up      Do you check your blood pressure regularly outside of the clinic? No     Are you following a low salt diet? Yes    Are your blood pressures ever more than 140 on the top number (systolic) OR more   than 90 on the bottom number (diastolic), for example 140/90? NA    BP Readings from Last 2 Encounters:   09/09/20 102/58   02/27/20 118/58     Hemoglobin A1C (%)   Date Value   02/11/2020 6.6 (H)   08/05/2019 6.6 (H)     LDL Cholesterol Calculated (mg/dL)   Date Value   04/23/2019 113     Diabetic foot exam   1. foot deformity   No  2. Current or previous foot ulceration     No  3. Current or previous pre-ulcerative calluses     No  4. previous partial amputation of one or both feet or complete amputation of one foot     No  5. peripheral neuropathy with evidence of callus formation     No  6. poor circulation     No      Doing well.  Trouble with the lancets and we are going to check at the  "pharmacy.      Review of Systems   Constitutional, HEENT, cardiovascular, pulmonary, gi and gu systems are negative, except as otherwise noted.      Objective    /58   Pulse 54   Temp 97.3  F (36.3  C)   Ht 1.727 m (5' 8\")   Wt 80.7 kg (178 lb)   SpO2 99%   BMI 27.06 kg/m    Body mass index is 27.06 kg/m .  Physical Exam   GENERAL: healthy, alert and no distress  NECK: no adenopathy, no asymmetry, masses, or scars and thyroid normal to palpation  RESP: lungs clear to auscultation - no rales, rhonchi or wheezes  CV: regular rate and rhythm, normal S1 S2, no S3 or S4, no murmur, click or rub, no peripheral edema and peripheral pulses strong  ABDOMEN: soft, nontender, no hepatosplenomegaly, no masses and bowel sounds normal  MS: no gross musculoskeletal defects noted, no edema            Assessment & Plan     Controlled type 2 diabetes mellitus without complication, unspecified whether long term insulin use (H)  Update full labs.  He is going to bring his meter to the pharmacy with his son.    - Albumin Random Urine Quantitative with Creat Ratio  - C FOOT EXAM  NO CHARGE  - Hemoglobin A1c  - Lipid Profile  - Comprehensive metabolic panel    Need for prophylactic vaccination and inoculation against influenza  Given.   - FLUZONE HIGH DOSE 65+  [99340]  - Vaccine Administration, Initial [55495]    CKD stage G3a/A2, GFR 45-59 and albumin creatinine ratio  mg/g (H)  Reviewed.  Update full labs.      Benign non-nodular prostatic hyperplasia with lower urinary tract symptoms  Update and follow.  No changes made today.        BMI:   Estimated body mass index is 27.06 kg/m  as calculated from the following:    Height as of this encounter: 1.727 m (5' 8\").    Weight as of this encounter: 80.7 kg (178 lb).               No follow-ups on file.    Eloy Conde MD  [facility]    "

## 2022-07-08 NOTE — TELEPHONE ENCOUNTER
"Requested Prescriptions   Pending Prescriptions Disp Refills     aspirin (ASA) 81 MG chewable tablet [Pharmacy Med Name: ASPIRIN 81MG CHEWABLE TABLET] 90 tablet 3     Sig: CHEW 1 TABLET (81 MG) BY MOUTH DAILY       Analgesics (Non-Narcotic Tylenol and ASA Only) Passed - 7/8/2022  2:02 PM        Passed - Recent (12 mo) or future (30 days) visit within the authorizing provider's specialty     Patient has had an office visit with the authorizing provider or a provider within the authorizing providers department within the previous 12 mos or has a future within next 30 days. See \"Patient Info\" tab in inbasket, or \"Choose Columns\" in Meds & Orders section of the refill encounter.              Passed - Patient is age 20 years or older     If ASA is flagged for ages under 20 years old. Forward to provider for confirmation Ryes Syndrome is not a concern.              Passed - Medication is active on med list     Last Written Prescription Date:  7/28/21  Last Fill Quantity: 90,   # refills: 3  Last Office Visit: 3/30/22 nursing home visit [facility]  Future Office visit: none       Routing refill request to provider for review/approval   Brenda J. Goodell, RN on 7/8/2022 at 3:06 PM      "

## 2022-08-15 PROBLEM — S09.90XA TRAUMATIC INJURY OF HEAD, INITIAL ENCOUNTER: Status: ACTIVE | Noted: 2022-01-01

## 2022-08-15 PROBLEM — N17.9 ACUTE KIDNEY INJURY (H): Status: ACTIVE | Noted: 2022-01-01
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
## 2022-08-15 PROBLEM — F03.90 SENILE DEMENTIA WITHOUT BEHAVIORAL DISTURBANCE (H): Status: ACTIVE | Noted: 2021-01-01

## 2022-08-15 PROBLEM — I48.91 ATRIAL FIBRILLATION WITH RVR (H): Status: ACTIVE | Noted: 2022-01-01

## 2022-08-15 NOTE — PROGRESS NOTES
" NSG ADMISSION NOTE    Patient admitted to room 302 at approximately 1130 via cart from emergency room. Patient was accompanied by son.     Verbal SBAR report received from Angela prior to patient arrival.     Patient ambulated to bed with stand-by assist. Patient alert and oriented X 3. The patient is not having any pain.  . Admission vital signs: Blood pressure 91/62, pulse 116, temperature (!) 95.3  F (35.2  C), temperature source Tympanic, resp. rate 24, height 1.803 m (5' 11\"), weight 85.2 kg (187 lb 13.3 oz), SpO2 98 %. Patient and son were oriented to plan of care, call light, bed controls, tv, telephone, bathroom and visiting hours.     Risk Assessment    The following safety risks were identified during admission: fall. Yellow risk band applied: YES.     Skin Initial Assessment    This writer admitted this patient and completed a full skin assessment and Roberto score in the Adult PCS flowsheet. Appropriate interventions initiated as needed.     Secondary skin check completed by Ioana.         Education    Patient has a [place] to Observation order: No  Observation education completed and documented: N/A      Mindi Jane RN    "

## 2022-08-15 NOTE — PROVIDER NOTIFICATION
08/15/22 1134   Valuables   Patient Belongings remains with patient;sent home   Patient Belongings Sent Home medical/assistive equipment;wallet   Patient Belongings Remaining with Patient shoes   Did you bring any home meds/supplements to the hospital?  No   .b

## 2022-08-15 NOTE — PLAN OF CARE
Goal Outcome Evaluation:    Plan of Care Reviewed With: patient, son     Overall Patient Progress: no change  Patient bp low. MD aware. Family at bedside. Resp even, non-labored. Heart  rate now in low one teens.

## 2022-08-15 NOTE — PHARMACY-ADMISSION MEDICATION HISTORY
Pharmacy -- Admission Medication Reconciliation    Prior to admission (PTA) medications were reviewed and the patient's PTA medication list was updated.    Sources Consulted: heather Gallego    The reliability of this Medication Reconciliation is: Reliability: Reliable    The following significant changes were made:    Added refresh eye drops    In addition, the patient's allergies were reviewed with the patient and updated as follows:   Allergies: Alatrofloxacin and No clinical screening - see comments    The pharmacist has reviewed with the patient that all personal medications should be removed from the building or locked in the belongings safe.  Patient shall only take medications ordered by the physician and administered by the nursing staff.       Medication barriers identified: nursing to assist   Medication adherence concerns: nursing to assist   Understanding of emergency medications: nursing to assist    Dedra Middleton RPH, 8/15/2022,  9:59 AM

## 2022-08-15 NOTE — PROVIDER NOTIFICATION
08/15/22 1134   Valuables   Patient Belongings remains with patient;sent home   Patient Belongings Sent Home medical/assistive equipment;wallet   Patient Belongings Remaining with Patient shoes   Did you bring any home meds/supplements to the hospital?  No   [facility] will make every effort per our policy to help keep your items safe while in the hospital.  If you choose to keep any items at the bedside, we cannot be held responsible for any items that are lost or broken.      List items sent to safe: none    I have reviewed my belongings list on admission and verify that it is correct.     Patient signature_______________________________  Date/Time_____________________    2nd Staff person if patient unable to sign __________________________  Date/Time ______________________      I have received all my belongings noted above at discharge.    Patient signature________________________________  Date/Time  __________________________

## 2022-08-15 NOTE — H&P
[facility]    History and Physical - Hospitalist Service       Date of Admission:  8/15/2022    Assessment & Plan      Jeronimo Payton is a 89 year old male admitted on 8/15/2022. He presents after a fall with acute kidney injury and atrial fibrillation with rapid ventricular response.     Principal Problem:    Atrial fibrillation with RVR (H)  Assessment: present on admission, persistently elevated heart rates despite IV and increased oral metoprolol dose. History of atrial fibrillation. Consider sepsis as cause for tachyarrhythmia.   Plan: Admit, tele, increase oral metopolol dose. If symptoms persist consider switching to diltiazem IV. Obtain echo.    Acute kidney injury   Assessment: present on admission, presumed related to poor perfusion from atrial fibrillation. Consider sepsis given leukocytosis, tachycardia.  Plan: intravenous fluids. Recheck in AM.    Active Problems:    Essential hypertension  Assessment: chronic  Plan: hold lisinopril due to acute kidney injury     Troponin elevation  Assessment: Present on admission, likely rate related or sepsis related.  Unlikely acute coronary syndrome.  Discussed this with family and everyone is in agreement to not transfer to a potential Cath Lab.  Plan: serial troponin      Controlled diabetes mellitus type II without complication (H)  Assessment: hold glipizide.  Plan: sliding scale insulin      Senile dementia without behavioral disturbance (H)      Fall, initial encounter       Diet: Combination Diet Regular Diet Adult    DVT Prophylaxis: Enoxaparin (Lovenox) SQ  Pierre Catheter: Not present  Central Lines: None  Cardiac Monitoring: ACTIVE order. Indication: Tachyarrhythmias, acute (48 hours)  Code Status: No CPR- Pre-arrest intubation OK      The patient's care was discussed with the Patient.    Gordy Collins MD  Hospitalist Service  [facility]  Securely message with the Vocera Web Console (learn more here)  Text  "page via [facility] Paging/Directory         ______________________________________________________________________    Chief Complaint   Fall     History is obtained from the patient    History of Present Illness   Jeronimo Payton is a 89 year old male who presents from [facility] living today after a fall.  It was not witnessed but staff heard him fall and called emergency services to bring him in.  Per his family he has \"not been himself\" for the past week.  He has a history of chronic atrial fibrillation and takes metoprolol but is on no anticoagulant.  He feels weak with a poor appetite.  He denies any fevers or chills.  He does have some mild dyspnea on exertion.    In the emergency department he is found to have acute kidney injury on labs with him mild white blood cell count elevation.  He is in atrial fibrillation with a rapid ventricular response, rates 110-140.  He was admitted for further work-up.    Review of Systems    CONSTITUTIONAL: NEGATIVE for fever, chills, change in weight  INTEGUMENTARY/SKIN: NEGATIVE for worrisome rashes, moles or lesions  EYES: NEGATIVE for vision changes or irritation  ENT/MOUTH: NEGATIVE for ear, mouth and throat problems  RESP:POSITIVE for dyspnea on exertion  CV: POSITIVE for irregular heart beat, NEGATIVE for chest pain/chest pressure and chest pain/pressure  GI: NEGATIVE for nausea, abdominal pain, heartburn, or change in bowel habits  : NEGATIVE for frequency, dysuria, or hematuria  MUSCULOSKELETAL: NEGATIVE for significant arthralgias or myalgia  NEURO: POSITIVE for fall  ENDOCRINE: NEGATIVE for temperature intolerance, skin/hair changes  HEME: NEGATIVE for bleeding problems  PSYCHIATRIC: NEGATIVE for changes in mood or affect    Past Medical History    I have reviewed this patient's medical history and updated it with pertinent information if needed.   Past Medical History:   Diagnosis Date     A-fib (H) 2/21/2020     Benign non-nodular prostatic hyperplasia " with lower urinary tract symptoms 10/22/2008     CKD stage G3a/A2, GFR 45-59 and albumin creatinine ratio  mg/g (H) 4/25/2017     Controlled diabetes mellitus type II without complication (H) 8/25/2004    Overview:      Elevated prostate specific antigen (PSA) 9/19/2006     Essential hypertension 3/28/2003    Overview:  Mentioned in chart  - Patient denies IMO Update     Hyperlipidemia 12/1/2006    Overview:  IMO Update 10/11     Hypertrophy of breast 10/31/2006     Vitamin D deficiency 12/3/2013       Past Surgical History   I have reviewed this patient's surgical history and updated it with pertinent information if needed.  Past Surgical History:   Procedure Laterality Date     CARPAL TUNNEL RELEASE RT/LT  01/01/2010     COLONOSCOPY - HIM SCAN  12/06/2006    Colonoscopy, flex, diagnostic     HERNIA REPAIR Left     groin      JOINT REPLACEMTN, KNEE RT/LT Bilateral     one was in 1992 the other was in 2000. unsure which was which.       Social History   I have reviewed this patient's social history and updated it with pertinent information if needed.  Social History     Tobacco Use     Smoking status: Never Smoker     Smokeless tobacco: Never Used   Substance Use Topics     Alcohol use: Yes     Comment: beer occasionally     Drug use: No       Family History   I have reviewed this patient's family history and updated it with pertinent information if needed.  Family History   Problem Relation Age of Onset     Diabetes Mother      Cancer Mother        Prior to Admission Medications   Prior to Admission Medications   Prescriptions Last Dose Informant Patient Reported? Taking?   Cholecalciferol (VITAMIN D) 50 MCG (2000 UT) CAPS 8/14/2022 at AM  No Yes   Sig: Take 1 capsule by mouth daily   aspirin (ASA) 81 MG chewable tablet 8/14/2022 at AM  No Yes   Sig: CHEW 1 TABLET (81 MG) BY MOUTH DAILY   blood glucose (NO BRAND SPECIFIED) test strip NA at NA  No No   Sig: Use to test blood sugar 1 times daily- Cecilia test  strips   blood glucose calibration (NO BRAND SPECIFIED) solution NA at NA  No No   Sig: Use to calibrate blood glucose monitor as needed as directed.   blood glucose monitoring (ACCU-CHEK KAYLEE PLUS) meter device kit NA at NA  No No   Sig: Use to test blood sugar 1 times daily   blood glucose monitoring (SOFTCLIX) lancets NA at NA  No No   Sig: Use to test blood sugar 1 times daily   carboxymethylcellulose PF (REFRESH PLUS) 0.5 % ophthalmic solution Unknown at Unknown time  Yes Yes   Sig: Place 1 drop into both eyes daily as needed for dry eyes   cyanocobalamin (VITAMIN B-12) 1000 MCG tablet 8/14/2022 at AM  No Yes   Sig: Take 1 tablet (1,000 mcg) by mouth daily   finasteride (PROSCAR) 5 MG tablet 8/14/2022 at AM  No Yes   Sig: Take 1 tablet (5 mg) by mouth daily   glipiZIDE (GLUCOTROL) 5 MG tablet 8/14/2022 at AM  No Yes   Sig: Take 0.5 tablets (2.5 mg) by mouth daily (with breakfast)   ibuprofen (ADVIL) 200 MG capsule 8/14/2022 at 0956  No Yes   Sig: Take 1 capsule (200 mg) by mouth every 6 hours as needed for fever   lisinopril (ZESTRIL) 20 MG tablet 8/14/2022 at AM  No Yes   Sig: Take 1 tablet (20 mg) by mouth daily   metoprolol succinate ER (TOPROL-XL) 25 MG 24 hr tablet 8/14/2022 at AM  No Yes   Sig: TAKE ONE-HALF TABLET (12.5MG) BY MOUTH DAILY      Facility-Administered Medications: None     Allergies   Allergies   Allergen Reactions     Alatrofloxacin      No Clinical Screening - See Comments      Some kind of anesthetic       Physical Exam   Vital Signs: Temp: (!) 96  F (35.6  C) Temp src: Tympanic BP: 92/67 Pulse: 82   Resp: 24 SpO2: 98 % O2 Device: None (Room air)    Weight: 187 lbs 13.31 oz    Constitutional: In no apparent distress  Eyes: pupils reactive, extraocular movements intact. Anicteric sclera.   HEENT: Oropharynx nonerythematous. Neck supple, no JVD.  Respiratory: no crackles noted, no wheezes.  Cardiovascular: irregular, tachy, no murmur. no lower extremity edema.  GI: soft, non-tender, bowel  sounds present.  Lymph/Hematologic: no cervical or supraclavicular LAD.  Genitourinary: deferred  Skin: no rashes, or sores  Musculoskeletal: no joint erythema or swelling  Neurologic: cranial nerves symmetric. Neuro exam nonfocal  Psychiatric: alert and oriented x3. Interactive.       Data   Data reviewed today: I reviewed all medications, new labs and imaging results over the last 24 hours. I personally reviewed the EKG tracing showing afib with rvr and the head CT image(s) showing no acute bleed.    Recent Labs   Lab 08/15/22  1146 08/15/22  0642 08/15/22  0635   WBC  --  11.1*  --    HGB  --  13.1*  --    MCV  --  94  --    PLT  --  142*  --    NA  --  136  --    POTASSIUM  --  4.5  --    CHLORIDE  --  103  --    CO2  --  18*  --    BUN  --  51*  --    CR 1.70* 1.63*  --    ANIONGAP  --  15*  --    RACH  --  9.7  --    GLC  --  334* 297*     Recent Results (from the past 24 hour(s))   CT Head w/o Contrast    Narrative    PROCEDURE INFORMATION:   Exam: CT Head Without Contrast   Exam date and time: 8/15/2022 7:17 AM   Age: 89 years old   Clinical indication: Injury or trauma; Fall; Blunt trauma (contusions or   hematomas); Consciousness not specified; Additional info: Fall with posterior   head strike     TECHNIQUE:   Imaging protocol: Computed tomography of the head without contrast.   Radiation optimization: All CT scans at this facility use at least one of these   dose optimization techniques: automated exposure control; mA and/or kV   adjustment per patient size (includes targeted exams where dose is matched to   clinical indication); or iterative reconstruction.     COMPARISON:   CT HEAD W/O CONTRAST 6/12/2019 1:18 PM     FINDINGS:   Brain: Prominent sulci. Patchy hypodensity of the cerebral white matter which   are nonspecific but likely secondary to microangiopathic changes.   Cerebral ventricles: The ventricles are prominent secondary to diffuse volume   loss/atrophy.   Paranasal sinuses: Visualized  sinuses are unremarkable. No fluid levels.   Mastoid air cells: Visualized mastoid air cells are well aerated.     Bones/joints: Unremarkable. No acute fracture.   Soft tissues: Unremarkable.       Impression    IMPRESSION:   Chronic age related changes but no evidence of acute intracranial pathology.     THIS DOCUMENT HAS BEEN ELECTRONICALLY SIGNED BY SEGUN SIGALA MD

## 2022-08-15 NOTE — ED TRIAGE NOTES
Pt arrives to the ER via ambulance from [facility]. Pt had an unwitnessed fall, staff heard him fall, and they found him on the floor. Pt says he hit his head. Pt arrives to ER alert, orientated to self and according to EMS, at his baseline dementia. Pt is also in a-fib RVR.   Partial trauma called, MD at bedside at pt arrival.   Triage Assessment     Row Name 08/15/22 0634       Triage Assessment (Adult)    Airway WDL WDL       Respiratory WDL    Respiratory WDL WDL       Skin Circulation/Temperature WDL    Skin Circulation/Temperature WDL WDL       Cardiac WDL    Cardiac WDL X;rhythm    Cardiac Rhythm apical pulse irregular       Peripheral/Neurovascular WDL    Peripheral Neurovascular WDL WDL       Cognitive/Neuro/Behavioral WDL    Cognitive/Neuro/Behavioral WDL WDL;X    Level of Consciousness confused  dementia    Arousal Level opens eyes spontaneously    Orientation disoriented to;time;place    Speech clear    Mood/Behavior calm;cooperative       Pupils (CN II)    Pupil PERRLA yes    Pupil Size Left 2 mm    Pupil Size Right 2 mm       [place] Coma Scale    Best Eye Response 4-->(E4) spontaneous    Best Motor Response 6-->(M6) obeys commands    Best Verbal Response 5-->(V5) oriented    [place] Coma Scale Score 15

## 2022-08-15 NOTE — PLAN OF CARE
Goal Outcome Evaluation:    Plan of Care Reviewed With: patient, son     Overall Patient Progress: no change

## 2022-08-15 NOTE — PROGRESS NOTES
:     Patient lives at [facility] unit.  will continue to follow patient for discharge planning needs.     SHARRI Aleman on 8/15/2022 at 4:20 PM

## 2022-08-15 NOTE — PLAN OF CARE
"Patient had increasing agitation. Md at bedside with family. Patient given a low dose of Ativan. Patient was becoming physically aggressive. After ativan, patient awake but resting calmly in bed. Vital signs stable. Iv patent site clear.  Problem: Plan of Care - These are the overarching goals to be used throughout the patient stay.    Goal: Plan of Care Review/Shift Note  Description: The Plan of Care Review/Shift note should be completed every shift.  The Outcome Evaluation is a brief statement about your assessment that the patient is improving, declining, or no change.  This information will be displayed automatically on your shift note.  Outcome: Ongoing, Progressing  Flowsheets (Taken 8/15/2022 1326)  Plan of Care Reviewed With:   patient   son  Overall Patient Progress: no change  Goal: Patient-Specific Goal (Individualized)  Description: You can add care plan individualizations to a care plan. Examples of Individualization might be:  \"Parent requests to be called daily at 9am for status\", \"I have a hard time hearing out of my right ear\", or \"Do not touch me to wake me up as it startles me\".  Outcome: Ongoing, Progressing  Goal: Absence of Hospital-Acquired Illness or Injury  Outcome: Ongoing, Progressing  Intervention: Prevent and Manage VTE (Venous Thromboembolism) Risk  Recent Flowsheet Documentation  Taken 8/15/2022 1128 by Mindi Jane, RN  Activity Management: activity adjusted per tolerance  Goal: Optimal Comfort and Wellbeing  Outcome: Ongoing, Progressing  Goal: Readiness for Transition of Care  Outcome: Ongoing, Progressing  Intervention: Mutually Develop Transition Plan  Recent Flowsheet Documentation  Taken 8/15/2022 1136 by Mindi Jane, RN  Equipment Currently Used at Home: walker, rolling     Problem: Fall Injury Risk  Goal: Absence of Fall and Fall-Related Injury  Outcome: Ongoing, Progressing  Intervention: Identify and Manage Contributors  Recent Flowsheet " Documentation  Taken 8/15/2022 1128 by Mindi Jane, RN  Medication Review/Management: medications reviewed     Problem: Dysrhythmia  Goal: Normalized Cardiac Rhythm  Outcome: Ongoing, Progressing   Goal Outcome Evaluation:    Plan of Care Reviewed With: patient, son     Overall Patient Progress: no change

## 2022-08-15 NOTE — ED PROVIDER NOTES
History     Chief Complaint   Patient presents with     Fall     Trauma     Jeronimo Payton is a 89 year old male presenting with head trauma.  Patient comes from [facility] where he had an unwitnessed fall in his room.  Patient states he felt woozy immediately prior to the fall.  He hit the back of his head.  He denies recent frequent falls. Patient is not on anticoagulant therapy.  Endorses mild headache and mild nausea both of which he declines medications for here in the ED.  Denies loss of consciousness, light headedness, chest or any other pain including back pain and neck pain, shortness of breath, vomiting, numbness, weakness.      Allergies   Allergen Reactions     Alatrofloxacin      No Clinical Screening - See Comments      Some kind of anesthetic       Patient Active Problem List    Diagnosis Date Noted     Atrial fibrillation with RVR (H) 08/15/2022     Priority: Medium     Fall, initial encounter 08/15/2022     Priority: Medium     Traumatic injury of head, initial encounter 08/15/2022     Priority: Medium     History of atrial fibrillation 02/09/2022     Priority: Medium     Senile dementia without behavioral disturbance (H) 10/22/2021     Priority: Medium     A-fib (H) 02/21/2020     Priority: Medium     CKD stage G3a/A2, GFR 45-59 and albumin creatinine ratio  mg/g (H) 04/25/2017     Priority: Medium     Vitamin D deficiency 12/03/2013     Priority: Medium     Benign non-nodular prostatic hyperplasia with lower urinary tract symptoms 10/22/2008     Priority: Medium     Hyperlipidemia 12/01/2006     Priority: Medium     Overview:   IMO Update 10/11       Hypertrophy of breast 10/31/2006     Priority: Medium     Elevated prostate specific antigen (PSA) 09/19/2006     Priority: Medium     Controlled diabetes mellitus type II without complication (H) 08/25/2004     Priority: Medium     Overview:        Essential hypertension 03/28/2003     Priority: Medium     Overview:    Mentioned in chart  - Patient denies  IMO Update         Past Medical History:   Diagnosis Date     A-fib (H) 2/21/2020     Benign non-nodular prostatic hyperplasia with lower urinary tract symptoms 10/22/2008     CKD stage G3a/A2, GFR 45-59 and albumin creatinine ratio  mg/g (H) 4/25/2017     Controlled diabetes mellitus type II without complication (H) 8/25/2004     Elevated prostate specific antigen (PSA) 9/19/2006     Essential hypertension 3/28/2003     Hyperlipidemia 12/1/2006     Hypertrophy of breast 10/31/2006     Vitamin D deficiency 12/3/2013       Past Surgical History:   Procedure Laterality Date     CARPAL TUNNEL RELEASE RT/LT  01/01/2010     COLONOSCOPY - HIM SCAN  12/06/2006    Colonoscopy, flex, diagnostic     HERNIA REPAIR Left     groin      JOINT REPLACEMTN, KNEE RT/LT Bilateral     one was in 1992 the other was in 2000. unsure which was which.       Family History   Problem Relation Age of Onset     Diabetes Mother      Cancer Mother        Social History     Tobacco Use     Smoking status: Never Smoker     Smokeless tobacco: Never Used   Substance Use Topics     Alcohol use: Yes     Comment: beer occasionally     Drug use: No       Medications:    aspirin (ASA) 81 MG chewable tablet  blood glucose (NO BRAND SPECIFIED) test strip  blood glucose calibration (NO BRAND SPECIFIED) solution  blood glucose monitoring (ACCU-CHEK KAYLEE PLUS) meter device kit  blood glucose monitoring (SOFTCLIX) lancets  Cholecalciferol (VITAMIN D) 50 MCG (2000 UT) CAPS  cyanocobalamin (VITAMIN B-12) 1000 MCG tablet  finasteride (PROSCAR) 5 MG tablet  glipiZIDE (GLUCOTROL) 5 MG tablet  ibuprofen (ADVIL) 200 MG capsule  lisinopril (ZESTRIL) 20 MG tablet  metoprolol succinate ER (TOPROL-XL) 25 MG 24 hr tablet        Review of Systems: See HPI for pertinent negatives and positives. All other systems reviewed and found to be negative.    Physical Exam   BP (!) 112/92   Pulse 115   Temp (!) 96  F (35.6  C)  (Tympanic)   Resp 26   SpO2 94%      General: awake, comfortable  HEENT: Questionable mild occipital lobe hematoma, sclera clear, no nasal discharge, PERRL, pupils symmetric  Respiratory: normal effort, clear to auscultation bilaterally  Cardiovascular: Tachycardic, irregular rhythm, no murmurs  Abdomen: soft, nondistended, nontender  Extremities: no deformities, edema, or tenderness  MSK: no spinal tenderness, UE and LE strength 5/5 b/l  Skin: warm, dry, no rashes, no ecchymosis, skin intact  Neuro: alert, moves all extremities, CN 2-12 intact, hand and pedal sensation intact bilaterally,  and pedal strength 5/5 bilaterally, FTN normal, not following HTS command, GCS 15.    ED Course      ED Course as of 08/15/22 0943   Mon Aug 15, 2022   0714 EKG: AF with RVR rate 134, nonischemic with no ST abnormalities, LBBB meeting sgarbossa criteria, I/II/V3-6 TWI.          Critical Care time:  was 45 minutes for this patient excluding procedures.       Trauma Summary Disposition     Patient is trauma admission:  Partial TTA    Spine  Backboard removal time: Backboard not applied   C-collar and immobilization: not indicated, cleared.  CSpine Clearance: by Nexus Criteria  Full Primary and Secondary survey with appropriate immobilization of spine completed in exam section.     Neuro  GCS at arrival:  Motor 6=Obeys commands   Verbal 5=Oriented   Eye Opening 4=Spontaneous   Total: 15         Results for orders placed or performed during the hospital encounter of 08/15/22 (from the past 24 hour(s))   Glucose by meter   Result Value Ref Range    GLUCOSE BY METER POCT 297 (H) 70 - 99 mg/dL   CBC with platelets differential    Narrative    The following orders were created for panel order CBC with platelets differential.  Procedure                               Abnormality         Status                     ---------                               -----------         ------                     CBC with platelets and  dAntoine..[[id number]]  Abnormal            Final result                 Please view results for these tests on the individual orders.   Basic metabolic panel   Result Value Ref Range    Sodium 136 134 - 144 mmol/L    Potassium 4.5 3.5 - 5.1 mmol/L    Chloride 103 98 - 107 mmol/L    Carbon Dioxide (CO2) 18 (L) 21 - 31 mmol/L    Anion Gap 15 (H) 3 - 14 mmol/L    Urea Nitrogen 51 (H) 7 - 25 mg/dL    Creatinine 1.63 (H) 0.70 - 1.30 mg/dL    Calcium 9.7 8.6 - 10.3 mg/dL    Glucose 334 (H) 70 - 105 mg/dL    GFR Estimate 39 (L) >60 mL/min/1.73m2   Troponin I   Result Value Ref Range    Troponin I 558.2 (HH) 0.0 - 34.0 pg/mL   TSH Reflex GH   Result Value Ref Range    TSH 3.30 0.40 - 4.00 mU/L   Magnesium   Result Value Ref Range    Magnesium 2.1 1.9 - 2.7 mg/dL   CBC with platelets and differential   Result Value Ref Range    WBC Count 11.1 (H) 4.0 - 11.0 10e3/uL    RBC Count 4.11 (L) 4.40 - 5.90 10e6/uL    Hemoglobin 13.1 (L) 13.3 - 17.7 g/dL    Hematocrit 38.8 (L) 40.0 - 53.0 %    MCV 94 78 - 100 fL    MCH 31.9 26.5 - 33.0 pg    MCHC 33.8 31.5 - 36.5 g/dL    RDW 13.2 10.0 - 15.0 %    Platelet Count 142 (L) 150 - 450 10e3/uL    % Neutrophils 79 %    % Lymphocytes 10 %    % Monocytes 8 %    % Eosinophils 1 %    % Basophils 1 %    % Immature Granulocytes 1 %    NRBCs per 100 WBC 0 <1 /100    Absolute Neutrophils 8.9 (H) 1.6 - 8.3 10e3/uL    Absolute Lymphocytes 1.1 0.8 - 5.3 10e3/uL    Absolute Monocytes 0.9 0.0 - 1.3 10e3/uL    Absolute Eosinophils 0.1 0.0 - 0.7 10e3/uL    Absolute Basophils 0.1 0.0 - 0.2 10e3/uL    Absolute Immature Granulocytes 0.1 <=0.4 10e3/uL    Absolute NRBCs 0.0 10e3/uL   CT Head w/o Contrast    Narrative    PROCEDURE INFORMATION:   Exam: CT Head Without Contrast   Exam date and time: 8/15/2022 7:17 AM   Age: 89 years old   Clinical indication: Injury or trauma; Fall; Blunt trauma (contusions or   hematomas); Consciousness not specified; Additional info: Fall with posterior   head strike     TECHNIQUE:    Imaging protocol: Computed tomography of the head without contrast.   Radiation optimization: All CT scans at this facility use at least one of these   dose optimization techniques: automated exposure control; mA and/or kV   adjustment per patient size (includes targeted exams where dose is matched to   clinical indication); or iterative reconstruction.     COMPARISON:   CT HEAD W/O CONTRAST 6/12/2019 1:18 PM     FINDINGS:   Brain: Prominent sulci. Patchy hypodensity of the cerebral white matter which   are nonspecific but likely secondary to microangiopathic changes.   Cerebral ventricles: The ventricles are prominent secondary to diffuse volume   loss/atrophy.   Paranasal sinuses: Visualized sinuses are unremarkable. No fluid levels.   Mastoid air cells: Visualized mastoid air cells are well aerated.     Bones/joints: Unremarkable. No acute fracture.   Soft tissues: Unremarkable.       Impression    IMPRESSION:   Chronic age related changes but no evidence of acute intracranial pathology.     THIS DOCUMENT HAS BEEN ELECTRONICALLY SIGNED BY SEGUN SIGALA MD   Troponin I   Result Value Ref Range    Troponin I 519.3 (HH) 0.0 - 34.0 pg/mL       Medications   0.9% sodium chloride BOLUS (500 mLs Intravenous New Bag 8/15/22 3566)     Followed by   sodium chloride 0.9% infusion (has no administration in time range)   metoprolol (LOPRESSOR) injection 5 mg (has no administration in time range)   metoprolol tartrate (LOPRESSOR) tablet 25 mg (has no administration in time range)   metoprolol (LOPRESSOR) injection 5 mg (5 mg Intravenous Given 8/15/22 0650)   metoprolol succinate ER (TOPROL-XL) 24 hr half-tab 12.5 mg (12.5 mg Oral Given 8/15/22 0802)       Assessments & Plan (with Medical Decision Making)     I have reviewed the nursing notes.    89 year old male evaluated for fall with head trauma after falling his room at the [facility] unit.  Current symptoms are a mild headache and mild nausea both of which  she declines any medication for currently.  Due to age and head trauma a partial trauma was called.  Partial trauma paperwork completed.  Patient felt woozy preceding the fall and therefore labs, EKG were added to head imaging.  C-spine cleared Via Nexus criteria.  Patient initially tachycardic with atrial fibrillation with RVR rate 130s-140s and he was treated with IV metoprolol.  Head CT and labs are pending at time of signout to Dr. Torres [facility] provider.    New Prescriptions    No medications on file       Final diagnoses:   Traumatic injury of head, initial encounter   Fall, initial encounter   Atrial fibrillation with RVR (H)   Acute kidney injury (H)       8/15/2022   [facility]       Esteban Membreno MD  08/15/22 0712       Esteban Membreno MD  08/15/22 0715    Care of patient turned over to myself at change of shift after fall at local [facility] unit.  CT scan of the head unremarkable.  Labs reveal some acute kidney injury.  Had a normal creatinine 5 months ago, today it is 1.63.  Also appears to be in atrial fibrillation.  In speaking to the son it sounds like he does not think that he is normally in atrial fibrillation, but has had a history of this in the past.  He is on aspirin but no other blood thinners.  He has apparently been feeling a little bit off for the last week.  He had been given some IV Lopressor prior to shift change in his heart rate was about 100 bpm.  I gave him his normal daily dose of Toprol-XL, which is quite small at 12.5 mg.  His rate is creeping back up into the 120 range.  He is also received a 500 cc bolus of normal saline.  Troponin elevated.  90-minute troponin actually showed a decrease.  I believe this is most likely due to his A. fib and some demand ischemia.  He denies chest pain.  I spoke with Dr. Collins, hospitalist, patient will be admitted for acute kidney injury and atrial fibrillation.  I Kathy give him another IV dose of Lopressor 5 mg orally  and some fast acting oral 25 mg.  I spoke with Dr. Collins, hospitalist, who has accepted the patient for admission.     Jeffrey Torres MD  08/15/22 1091

## 2022-08-16 NOTE — PLAN OF CARE
Goal Outcome Evaluation:    Plan of Care Reviewed With: patient, son     Overall Patient Progress: improving  Patient increased sob. Patient repositioned, head elevated. Spoke with md, will saline lock the Iv.

## 2022-08-16 NOTE — PLAN OF CARE
Goal Outcome Evaluation:    Plan of Care Reviewed With: patient, son     Overall Patient Progress: no change  When patient was turned, DuoDerm was removed from the patients coccyx area. Skin under neath DuoDerm was dark pink in color. Small calloused area noted. New dressing applied for protection of the skin.

## 2022-08-16 NOTE — PLAN OF CARE
Goal Outcome Evaluation:    Plan of Care Reviewed With: patient, son     Overall Patient Progress: improving

## 2022-08-16 NOTE — PLAN OF CARE
"Goal Outcome Evaluation:  Patient alert and confused.VSS.  LS clear. BS audible. Continued antibiotics. CCRQ2, manwick in place. Some restlessness noted, repositioning seemed to help.  Family at bedside.   Blood pressure 112/63, pulse 120, temperature 97  F (36.1  C), temperature source Tympanic, resp. rate 26, height 1.803 m (5' 11\"), weight 85.2 kg (187 lb 13.3 oz), SpO2 94 %.    Plan of Care Reviewed With: son     Overall Patient Progress: no change           "

## 2022-08-16 NOTE — PLAN OF CARE
Goal Outcome Evaluation:    Plan of Care Reviewed With: patient, son     Overall Patient Progress: improving     Patient having short periods of apnea. Then resp between 32-36. Bp low. Heart rate elevated. Spoke with md. Family conference held. Patient will transition to comfort care.

## 2022-08-16 NOTE — PROGRESS NOTES
Mike called to notify us that patient had a large run of V tach. Theresa Carlos RN 8/16/2022 9:23 AM

## 2022-08-16 NOTE — PROGRESS NOTES
SAFETY CHECKLIST  ID Bands and Risk clasps correct and in place (DNR, Fall risk, Allergy, Latex, Limb):  Yes  All Lines Reconciled and labeled correctly: Yes  Whiteboard updated:Yes  Environmental interventions (bed/chair alarm on, call light, side rails, restraints, sitter....): Yes  Verify Tele #: 2  Lety Ibarra RN on 8/15/2022 at 7:24 PM

## 2022-08-16 NOTE — PLAN OF CARE
Goal Outcome Evaluation:    Plan of Care Reviewed With: patient, son     Overall Patient Progress: improving     Patient has decreased circulation in his lower legs from knees down. He has periods of apnea, followed by increased resp. Family is at bedside, updated on noted changes.

## 2022-08-16 NOTE — PROGRESS NOTES
Patient had multiple 5-9 Beat  V-runs during the night, also heart rates that popped up to 130 a few times.

## 2022-08-16 NOTE — PROGRESS NOTES
[facility]    Medicine Progress Note - Hospitalist Service    Date of Admission:  8/15/2022    Assessment & Plan          Jeronimo Payton is a 89 year old male admitted on 8/15/2022. He presents after a fall with acute kidney injury and atrial fibrillation with rapid ventricular response.     Principal Problem:      non-ST elevation MI  Assessment: present on admission, vs demand ischemia. Significant increase in troponin with diminished EF. Multiple runs of NSVT over hospital stay. Discussed poor prognosis with sons. In agreement for comfort care. Death imminent.   Plan: comfort care order set.      Atrial fibrillation with RVR (H)  Assessment: present on admission, persistently elevated heart rates despite IV and increased oral metoprolol dose. History of atrial fibrillation. Consider sepsis as cause for tachyarrhythmia.       Acute kidney injury   Assessment: present on admission, presumed related to poor perfusion from atrial fibrillation. Consider sepsis given leukocytosis, tachycardia.      Active Problems:    Essential hypertension  Assessment: chronic      Troponin elevation  Assessment: Present on admission, likely rate related or sepsis related.  Unlikely acute coronary syndrome.  Discussed this with family and everyone is in agreement to not transfer to a potential Cath Lab.        Controlled diabetes mellitus type II without complication (H)  Assessment: hold glipizide.        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 Indication: Tachyarrhythmias, acute (48 hours)  Code Status: DNR DNI       The patient's care was discussed with the Patient.    Gordy Collins MD  Hospitalist Service  [facility]  Securely message with the Vocera Web Console (learn more  here)  Text page via [facility] Paging/Directory         Interval History   Unresponsive, multiple runs of ventricular tachycardia. Periods of apnea.     Data reviewed today: I reviewed all medications, new labs and imaging results over the last 24 hours. I personally reviewed no images or EKG's today.    Physical Exam   Vital Signs: Temp: 97.5  F (36.4  C) Temp src: Tympanic BP: (!) 112/91 Pulse: 113   Resp: 20 SpO2: 95 % O2 Device: None (Room air)    Weight: 189 lbs 2.47 oz  GENERAL: Comfortable, no apparent distress.  CARDIOVASCULAR: irregular rhythm, no murmur. No lower extremity edema   RESPIRATORY: Clear to auscultation bilaterally, no wheezes or crackles.  GI: non-tender, non-distended, normal bowel sounds.   SKIN: warm periphery, no rashes      Data   Recent Labs   Lab 22  1056 22  0805 22  0759 22  0523 08/15/22  1644 08/15/22  1146 08/15/22  0642   WBC  --   --   --  9.1  --   --  11.1*   HGB  --   --   --  13.0*  --   --  13.1*   MCV  --   --   --  94  --   --  94   PLT  --   --   --  125*  --   --  142*   NA  --   --   --  139  --   --  136   POTASSIUM  --   --   --  4.2  --   --  4.5   CHLORIDE  --   --   --  108*  --   --  103   CO2  --   --   --  20*  --   --  18*   BUN  --   --   --  53*  --   --  51*   CR  --   --   --  1.54*  --  1.70* 1.63*   ANIONGAP  --   --   --  11  --   --  15*   RACH  --   --   --  9.2  --   --  9.7   * 120* 178* 201*   < >  --  334*    < > = values in this interval not displayed.     Recent Results (from the past 24 hour(s))   Echocardiogram Complete   Result Value    LVEF  10-15%    Narrative    [id number]  [id number]  [id number]  [id number]^LUIS^JENIFER^L     [facility]  [address] [place] [address]     Name: SHAGGY GRANGER  MRN: [id number]  : 1927  Study Date: 08/15/2022 01:06 PM  Age: 89 yrs  Gender: Male  Patient Location: [facility]  Reason For Study: Atrial Fibrillation  Ordering Physician: JENIFER SMITH  L  Performed By: Anais Gonzalez RDCS, RVT     BSA: 2.0 m2  Height: 71 in  Weight: 187 lb  HR: 95  BP: 92/67 mmHg  ______________________________________________________________________________  Procedure  Complete Portable Echo Adult.  ______________________________________________________________________________  Interpretation Summary  Left ventricular size is normal.  The visual ejection fraction is 10-15%.  The right ventricle is normal size.  Global right ventricular function is severely reduced.  Moderate biatrial enlargement is present.  Moderate tricuspid insufficiency is present.  Right ventricular systolic pressure is 50mmHg above the right atrial pressure.  IVC diameter and respiratory changes fall into an intermediate range  suggesting an RA pressure of 8 mmHg.  No pericardial effusion is present.  ______________________________________________________________________________  Left Ventricle  Left ventricular size is normal. Mild to moderate concentric wall thickening  consistent with left ventricular hypertrophy is present. Diastolic function  not assessed due to atrial fibrillation. The visual ejection fraction is 10-  15%. No regional wall motion abnormalities are seen.     Right Ventricle  The right ventricle is normal size. Global right ventricular function is  severely reduced.     Atria  Moderate biatrial enlargement is present. The atrial septum is intact as  assessed by color Doppler .     Mitral Valve  Mild to moderate mitral annular calcification is present. Trace to mild mitral  insufficiency is present.     Aortic Valve  Mild aortic valve calcification is present. The mean AoV pressure gradient is  1.0 mmHg. The calculated aortic valve are is 3.5 cm^2.     Tricuspid Valve  Moderate tricuspid insufficiency is present. Right ventricular systolic  pressure is 50mmHg above the right atrial pressure.     Pulmonic Valve  The pulmonic valve is normal. Trace pulmonic insufficiency is present.      Vessels  The thoracic aorta is normal. The pulmonary artery is normal. IVC diameter and  respiratory changes fall into an intermediate range suggesting an RA pressure  of 8 mmHg.     Pericardium  No pericardial effusion is present.     ______________________________________________________________________________  MMode/2D Measurements & Calculations  IVSd: 1.6 cm  LVIDd: 4.3 cm  LVIDs: 3.9 cm  LVPWd: 1.4 cm  FS: 10.2 %  LV mass(C)d: 262.5 grams  LV mass(C)dI: 128.1 grams/m2  Ao root diam: 3.9 cm  asc Aorta Diam: 3.6 cm     LVOT diam: 2.4 cm  LVOT area: 4.5 cm2  LA Volume (BP): 82.2 ml  LA Volume Index (BP): 40.1 ml/m2  RWT: 0.66     Doppler Measurements & Calculations  Ao V2 max: 66.5 cm/sec  Ao max P.0 mmHg  Ao V2 mean: 49.7 cm/sec  Ao mean P.0 mmHg  Ao V2 VTI: 9.7 cm  ANTON(I,D): 3.5 cm2  ANTON(V,D): 3.4 cm2  LV V1 max P.0 mmHg  LV V1 max: 50.0 cm/sec  LV V1 VTI: 7.6 cm  SV(LVOT): 34.2 ml  SI(LVOT): 16.7 ml/m2  PA acc time: 0.06 sec     PI end-d ant: 140.0 cm/sec  TR max ant: 287.6 cm/sec  TR max P.8 mmHg  AV Ant Ratio (DI): 0.75  ANTON Index (cm2/m2): 1.7     ______________________________________________________________________________  Report approved by: Mary Monterroso 08/15/2022 02:15 PM           Medications     Reason anticoagulant not prescribed for atrial fibrillation         aspirin  81 mg Oral Daily     enoxaparin ANTICOAGULANT  40 mg Subcutaneous Daily     finasteride  5 mg Oral Daily     insulin aspart  1-7 Units Subcutaneous TID AC     insulin aspart  1-5 Units Subcutaneous At Bedtime     metoprolol tartrate  25 mg Oral BID     piperacillin-tazobactam  3.375 g Intravenous Q6H     QUEtiapine  25 mg Oral BID     sodium chloride (PF)  3 mL Intracatheter Q8H

## 2022-08-16 NOTE — PROGRESS NOTES
08/15/22 0847   Quick Adds   Type of Visit Initial Occupational Therapy Evaluation   Living Environment   People in Home alone   Current Living Arrangements assisted living   Home Accessibility no concerns   Transportation Anticipated family or friend will provide   Self-Care   Usual Activity Tolerance good   Current Activity Tolerance fair   Equipment Currently Used at Home walker, rolling   Cognitive Status Examination   Orientation Status not oriented to person, place or time   Behavioral Issues inappropriate language   Affect/Mental Status (Cognitive) anxious;confused   Follows Commands does not follow one-step commands  (consitiently)   Safety Deficit impulsivity;insight into deficits/self-awareness   Memory Deficit short-term memory   Attention Deficit perseverates on recent conversation   Executive Function Deficit insight/awareness of deficits;self-monitoring/self-correction   Cognitive Status Comments Family reports pt's Cognition at baseline is normal   Visual Perception   Visual Impairment/Limitations WFL   Pain Assessment   Patient Currently in Pain No   Range of Motion Comprehensive   General Range of Motion no range of motion deficits identified   Strength Comprehensive (MMT)   General Manual Muscle Testing (MMT) Assessment no strength deficits identified   Coordination   Upper Extremity Coordination No deficits were identified   Bed Mobility   Bed Mobility supine-sit   Supine-Sit [place] (Bed Mobility) minimum assist (75% patient effort);verbal cues   Comment (Bed Mobility) Pt sat at EOB and refused to stand or ambulate to bathroom, also refused to lay back down, spent much time enouraging pt to return to bed for safety   Clinical Impression   Criteria for Skilled Therapeutic Interventions Met (OT) Yes, treatment indicated   OT Diagnosis weakness and confusion   Influenced by the following impairments confusion   OT Problem List-Impairments impacting ADL activity tolerance impaired;cognition    Assessment of Occupational Performance 1-3 Performance Deficits   Identified Performance Deficits mobility and self care   Planned Therapy Interventions (OT) ADL retraining;progressive activity/exercise   Clinical Decision Making Complexity (OT) low complexity   OT Discharge Planning   OT Discharge Recommendation (DC Rec) home with assist   OT Rationale for DC Rec Pt may not be appropriate for further therapies, may need increased assist at [facility], will continue to assess as pt hopefully clears   OT Brief overview of current status Pt was in bed upon approach, encouraged to get OOB and to Bathroom as nursing needed a urine sample, pt sat at EOB with Min A but refused to stand or attempt to give sample, was joking and confused but resistant to any attempts at funtion and to lay back down, ircardo Arvizu present and sat with Pt, RN eventually able to get pt back to bed   Total Evaluation Time (Minutes)   Total Evaluation Time (Minutes) 15   OT Goals   Therapy Frequency (OT) Daily   OT Predicted Duration/Target Date for Goal Attainment 08/18/22   OT Goals Hygiene/Grooming;Transfers;Toilet Transfer/Toileting   OT: Hygiene/Grooming supervision/stand-by assist   OT: Transfer Supervision/stand-by assist   OT: Toilet Transfer/Toileting Supervision/stand-by assist

## 2022-08-16 NOTE — PLAN OF CARE
Goal Outcome Evaluation:    Plan of Care Reviewed With: patient, son     Overall Patient Progress: improving     Patient having periods of apnea. When breathing resp are 32-36. Patient was asked if he was having pain, responded yes. Patient was given 5 mg of morphine sublingual for resp of 36. Family at bedside, plan of care was discussed, prior to med being given.

## 2022-08-17 NOTE — PROGRESS NOTES
Called into patient room by family just after midnight with concerns that patient may have passed. Nurse listened for heart sounds and palpated for pulses. None were present. All appropriate resources contacted.  home contacted. Markus Farley RN on 2022 at 12:22 AM

## 2022-08-17 NOTE — DISCHARGE SUMMARY
[facility]    Death Summary - Hospitalist Service     Date of Admission:  8/15/2022  Date of Death: 8/17/2022  Discharging Provider: Gordy Collins MD    Discharge Diagnoses   Principal Problem:  Cardiogenic shock, present on admission     NSTEMI    Atrial fibrillation with RVR (H)  Active Problems:    Essential hypertension    Controlled diabetes mellitus type II without complication (H)    Senile dementia without behavioral disturbance (H)    Fall, initial encounter    Traumatic injury of head, initial encounter    Acute kidney injury (H)    NSVT  Thrombocytopenia  Cause of death: Cardiac arrhythmia due to MI    Hospital Course   Jeronimo Payton is a 89-year-old gentleman who presented from [facility] after an unwitnessed fall.  He was found to have atrial fibrillation with a rapid ventricular response and admitted to the hospital.  In addition he had an acute kidney injury.  He became less responsive during his first day in the hospital, and it was noted that his troponin continued to climb and he had multiple runs of nonsustained ventricular tachycardia.  An echocardiogram was obtained showing an EF of 10 to 15%.  This suspect the patient had a non-ST elevation MI.  His condition and worsened on August 16 and he became less responsive.  I spoke with his 2 sons numerous times and we discussed treatment options including aggressive treatment versus comfort care.  Given his age and memory deficits family elected for comfort care.  Patient passed away around midnight on August 17.      Gordy Collins MD  [facility]  ______________________________________________________________________      Significant Results and Procedures   Most Recent 3 CBC's:Recent Labs   Lab Test 08/16/22  0523 08/15/22  0642 06/14/22  1043 02/24/22  0800   WBC 9.1 11.1*  --  6.7   HGB 13.0* 13.1* 12.3* 12.4*   MCV 94 94  --  93   * 142*  --  152     Most Recent 3  BMP's:Recent Labs   Lab Test 08/16/22  2118 08/16/22  1602 08/16/22  1056 08/16/22  0759 08/16/22  0523 08/15/22  1644 08/15/22  1146 08/15/22  0642 08/15/22  0635 02/24/22  0800   NA  --   --   --   --  139  --   --  136  --  136   POTASSIUM  --   --   --   --  4.2  --   --  4.5  --  4.4   CHLORIDE  --   --   --   --  108*  --   --  103  --  104   CO2  --   --   --   --  20*  --   --  18*  --  25   BUN  --   --   --   --  53*  --   --  51*  --  17   CR  --   --   --   --  1.54*  --  1.70* 1.63*  --  1.09   ANIONGAP  --   --   --   --  11  --   --  15*  --  7   RACH  --   --   --   --  9.2  --   --  9.7  --  9.0   * 152* 210*   < > 201*   < >  --  334*   < > 194*    < > = values in this interval not displayed.     Most Recent 3 Troponin's:Recent Labs   Lab Test 02/22/20  1236 02/22/20  0419 02/21/20  1523   TROPI 1.175* 1.452* 1.180*   ,   Results for orders placed or performed during the hospital encounter of 08/15/22   CT Head w/o Contrast    Narrative    PROCEDURE INFORMATION:   Exam: CT Head Without Contrast   Exam date and time: 8/15/2022 7:17 AM   Age: 89 years old   Clinical indication: Injury or trauma; Fall; Blunt trauma (contusions or   hematomas); Consciousness not specified; Additional info: Fall with posterior   head strike     TECHNIQUE:   Imaging protocol: Computed tomography of the head without contrast.   Radiation optimization: All CT scans at this facility use at least one of these   dose optimization techniques: automated exposure control; mA and/or kV   adjustment per patient size (includes targeted exams where dose is matched to   clinical indication); or iterative reconstruction.     COMPARISON:   CT HEAD W/O CONTRAST 6/12/2019 1:18 PM     FINDINGS:   Brain: Prominent sulci. Patchy hypodensity of the cerebral white matter which   are nonspecific but likely secondary to microangiopathic changes.   Cerebral ventricles: The ventricles are prominent secondary to diffuse volume    loss/atrophy.   Paranasal sinuses: Visualized sinuses are unremarkable. No fluid levels.   Mastoid air cells: Visualized mastoid air cells are well aerated.     Bones/joints: Unremarkable. No acute fracture.   Soft tissues: Unremarkable.       Impression    IMPRESSION:   Chronic age related changes but no evidence of acute intracranial pathology.     THIS DOCUMENT HAS BEEN ELECTRONICALLY SIGNED BY SEGUN SIGALA MD   Echocardiogram Complete     Value    LVEF  10-15%    Narrative    [id number]  [id number]  [id number]  [id number]^JENIFER^L     [facility]  [address] [place] [address]     Name: SHAGGY GRANGER  MRN: [id number]  : 1927  Study Date: 08/15/2022 01:06 PM  Age: 89 yrs  Gender: Male  Patient Location: [facility]  Reason For Study: Atrial Fibrillation  Ordering Physician: JENIFER SMITH  Performed By: Anais Gonzalez RDCS, RVT     BSA: 2.0 m2  Height: 71 in  Weight: 187 lb  HR: 95  BP: 92/67 mmHg  ______________________________________________________________________________  Procedure  Complete Portable Echo Adult.  ______________________________________________________________________________  Interpretation Summary  Left ventricular size is normal.  The visual ejection fraction is 10-15%.  The right ventricle is normal size.  Global right ventricular function is severely reduced.  Moderate biatrial enlargement is present.  Moderate tricuspid insufficiency is present.  Right ventricular systolic pressure is 50mmHg above the right atrial pressure.  IVC diameter and respiratory changes fall into an intermediate range  suggesting an RA pressure of 8 mmHg.  No pericardial effusion is present.  ______________________________________________________________________________  Left Ventricle  Left ventricular size is normal. Mild to moderate concentric wall thickening  consistent with left ventricular hypertrophy is present. Diastolic function  not assessed due to atrial  fibrillation. The visual ejection fraction is 10-  15%. No regional wall motion abnormalities are seen.     Right Ventricle  The right ventricle is normal size. Global right ventricular function is  severely reduced.     Atria  Moderate biatrial enlargement is present. The atrial septum is intact as  assessed by color Doppler .     Mitral Valve  Mild to moderate mitral annular calcification is present. Trace to mild mitral  insufficiency is present.     Aortic Valve  Mild aortic valve calcification is present. The mean AoV pressure gradient is  1.0 mmHg. The calculated aortic valve are is 3.5 cm^2.     Tricuspid Valve  Moderate tricuspid insufficiency is present. Right ventricular systolic  pressure is 50mmHg above the right atrial pressure.     Pulmonic Valve  The pulmonic valve is normal. Trace pulmonic insufficiency is present.     Vessels  The thoracic aorta is normal. The pulmonary artery is normal. IVC diameter and  respiratory changes fall into an intermediate range suggesting an RA pressure  of 8 mmHg.     Pericardium  No pericardial effusion is present.     ______________________________________________________________________________  MMode/2D Measurements & Calculations  IVSd: 1.6 cm  LVIDd: 4.3 cm  LVIDs: 3.9 cm  LVPWd: 1.4 cm  FS: 10.2 %  LV mass(C)d: 262.5 grams  LV mass(C)dI: 128.1 grams/m2  Ao root diam: 3.9 cm  asc Aorta Diam: 3.6 cm     LVOT diam: 2.4 cm  LVOT area: 4.5 cm2  LA Volume (BP): 82.2 ml  LA Volume Index (BP): 40.1 ml/m2  RWT: 0.66     Doppler Measurements & Calculations  Ao V2 max: 66.5 cm/sec  Ao max P.0 mmHg  Ao V2 mean: 49.7 cm/sec  Ao mean P.0 mmHg  Ao V2 VTI: 9.7 cm  ANTON(I,D): 3.5 cm2  ANTON(V,D): 3.4 cm2  LV V1 max P.0 mmHg  LV V1 max: 50.0 cm/sec  LV V1 VTI: 7.6 cm  SV(LVOT): 34.2 ml  SI(LVOT): 16.7 ml/m2  PA acc time: 0.06 sec     PI end-d ant: 140.0 cm/sec  TR max ant: 287.6 cm/sec  TR max P.8 mmHg  AV Ant Ratio (DI): 0.75  ANTON Index (cm2/m2): 1.7      ______________________________________________________________________________  Report approved by: Mary Monterroso 08/15/2022 02:15 PM               Consultations This Hospital Stay   OCCUPATIONAL THERAPY ADULT IP CONSULT  PHYSICAL THERAPY ADULT IP CONSULT  CARE MANAGEMENT / SOCIAL WORK IP CONSULT  SOCIAL WORK IP CONSULT    Primary Care Physician   Gene Atwood    Time Spent on this Encounter   I, Gordy Collins MD, personally saw the patient today and spent greater than 30 minutes discharging this patient.

## 2022-08-17 NOTE — PROGRESS NOTES
08/15/22 1700   Quick Adds   Type of Visit Initial PT Evaluation   Living Environment   People in Home alone   Current Living Arrangements apartment;assisted living   Home Accessibility no concerns;wheelchair accessible   Transportation Anticipated family or friend will provide   Self-Care   Usual Activity Tolerance good   Current Activity Tolerance fair   Equipment Currently Used at Home walker, rolling   Fall history within last six months yes   Number of times patient has fallen within last six months   (multiple)   General Information   Referring Physician Dennis   Patient/Family Therapy Goals Statement (PT) return to nursing home when able   Existing Precautions/Restrictions fall   Weight-Bearing Status - LLE full weight-bearing   Weight-Bearing Status - RLE full weight-bearing   Cognition   Affect/Mental Status (Cognition) agitated   Orientation Status (Cognition) person   Follows Commands (Cognition) follows one-step commands   Safety Deficit (Cognition) insight into deficits/self-awareness;impulsivity   Memory Deficit (Cognition) moderate deficit   Pain Assessment   Patient Currently in Pain No   Integumentary/Edema   Integumentary/Edema no deficits were identifed   Posture    Posture Forward head position   Range of Motion (ROM)   Range of Motion ROM is WFL   Strength (Manual Muscle Testing)   Strength (Manual Muscle Testing) strength is WFL   Bed Mobility   Comment, (Bed Mobility) SBA   Transfers   Comment, (Transfers) CGA with Fww   Gait/Stairs (Locomotion)   Comment, (Gait/Stairs) patient did not ambulate with PT at time of assessment   Balance   Balance Comments fair with Fww   Sensory Examination   Sensory Perception WFL   Coordination   Coordination no deficits were identified   Muscle Tone   Muscle Tone no deficits were identified   Clinical Impression   Criteria for Skilled Therapeutic Intervention Yes, treatment indicated   PT Diagnosis (PT) impaired mobility   Influenced by the following impairments  fatigue   Functional limitations due to impairments activity/gait tolerance and stability   Clinical Presentation (PT Evaluation Complexity) Stable/Uncomplicated   Clinical Decision Making (Complexity) low complexity   Planned Therapy Interventions (PT) gait training;transfer training   Risk & Benefits of therapy have been explained risks/benefits reviewed;patient;son   PT Discharge Planning   PT Discharge Recommendation (DC Rec) home with assist   PT Rationale for DC Rec anticipate patient to be near/at baseline for mobility at time of discharge   PT Brief overview of current status SBA for bed moblities; CGA for sit to stand with Fww; patient unwilling to perform ambulation at time of PT assessment; patient does appear to possess good mobility, however.   Plan of Care Review   Plan of Care Reviewed With patient;son   Total Evaluation Time   Total Evaluation Time (Minutes) 15

## 2022-08-18 LAB — BACTERIA UR CULT: NO GROWTH

## 2022-08-20 LAB
BACTERIA BLD CULT: NO GROWTH
BACTERIA BLD CULT: NO GROWTH

## 2025-03-22 NOTE — TELEPHONE ENCOUNTER
Hold metformin x 1 week and report back.  Monitor sugar during that time and report if the trend is greater than 200's sooner.  Thanks.  Eloy Conde MD     first name

## (undated) RX ORDER — METOPROLOL TARTRATE 1 MG/ML
INJECTION, SOLUTION INTRAVENOUS
Status: DISPENSED
Start: 2022-01-01

## (undated) RX ORDER — METOPROLOL TARTRATE 25 MG/1
TABLET, FILM COATED ORAL
Status: DISPENSED
Start: 2022-01-01